# Patient Record
Sex: MALE | Race: WHITE | Employment: FULL TIME | URBAN - METROPOLITAN AREA
[De-identification: names, ages, dates, MRNs, and addresses within clinical notes are randomized per-mention and may not be internally consistent; named-entity substitution may affect disease eponyms.]

---

## 2021-09-16 ENCOUNTER — HOSPITAL ENCOUNTER (EMERGENCY)
Age: 41
Discharge: LEFT BEFORE DISCHARGE | End: 2021-09-16
Payer: MEDICAID

## 2021-09-16 VITALS
BODY MASS INDEX: 39.86 KG/M2 | OXYGEN SATURATION: 98 % | SYSTOLIC BLOOD PRESSURE: 167 MMHG | WEIGHT: 225 LBS | HEART RATE: 90 BPM | TEMPERATURE: 98.4 F | RESPIRATION RATE: 18 BRPM | DIASTOLIC BLOOD PRESSURE: 95 MMHG

## 2021-09-16 DIAGNOSIS — M79.672 LEFT FOOT PAIN: Primary | ICD-10-CM

## 2021-09-16 ASSESSMENT — PAIN SCALES - GENERAL: PAINLEVEL_OUTOF10: 7

## 2021-09-16 NOTE — ED PROVIDER NOTES
This patient was not seen by me. I was assigned to the patient, but he ended up leaving before being seen by a provider.          Sigrid Kearns [place]  09/16/21 5778

## 2021-11-09 ENCOUNTER — HOSPITAL ENCOUNTER (EMERGENCY)
Age: 41
Discharge: HOME | End: 2021-11-09
Payer: MEDICAID

## 2021-11-09 ENCOUNTER — APPOINTMENT (OUTPATIENT)
Dept: GENERAL RADIOLOGY | Age: 41
End: 2021-11-09
Payer: MEDICAID

## 2021-11-09 VITALS
WEIGHT: 225 LBS | RESPIRATION RATE: 16 BRPM | BODY MASS INDEX: 39.87 KG/M2 | HEART RATE: 96 BPM | DIASTOLIC BLOOD PRESSURE: 102 MMHG | SYSTOLIC BLOOD PRESSURE: 158 MMHG | OXYGEN SATURATION: 97 % | TEMPERATURE: 99.1 F | HEIGHT: 63 IN

## 2021-11-09 DIAGNOSIS — R07.9 CHEST PAIN, UNSPECIFIED TYPE: ICD-10-CM

## 2021-11-09 DIAGNOSIS — R05.9 COUGH: ICD-10-CM

## 2021-11-09 DIAGNOSIS — J18.9 PNEUMONIA OF BOTH LOWER LOBES DUE TO INFECTIOUS ORGANISM: Primary | ICD-10-CM

## 2021-11-09 LAB
A/G RATIO: 2.1 (ref 1.1–2.2)
ALBUMIN SERPL-MCNC: 5.1 G/DL (ref 3.4–5)
ALP BLD-CCNC: 81 U/L (ref 40–129)
ALT SERPL-CCNC: 39 U/L (ref 10–40)
ANION GAP SERPL CALCULATED.3IONS-SCNC: 15 MMOL/L (ref 3–16)
AST SERPL-CCNC: 21 U/L (ref 15–37)
BASOPHILS ABSOLUTE: 0.1 K/UL (ref 0–0.2)
BASOPHILS RELATIVE PERCENT: 0.9 %
BILIRUB SERPL-MCNC: 2.1 MG/DL (ref 0–1)
BUN BLDV-MCNC: 16 MG/DL (ref 7–20)
CALCIUM SERPL-MCNC: 9.7 MG/DL (ref 8.3–10.6)
CHLORIDE BLD-SCNC: 102 MMOL/L (ref 99–110)
CO2: 24 MMOL/L (ref 21–32)
CREAT SERPL-MCNC: 0.8 MG/DL (ref 0.9–1.3)
D DIMER: <200 NG/ML DDU (ref 0–229)
EOSINOPHILS ABSOLUTE: 0.1 K/UL (ref 0–0.6)
EOSINOPHILS RELATIVE PERCENT: 1.5 %
GFR AFRICAN AMERICAN: >60
GFR NON-AFRICAN AMERICAN: >60
GLUCOSE BLD-MCNC: 104 MG/DL (ref 70–99)
HCT VFR BLD CALC: 40 % (ref 40.5–52.5)
HEMOGLOBIN: 14 G/DL (ref 13.5–17.5)
LACTIC ACID, SEPSIS: 1 MMOL/L (ref 0.4–1.9)
LYMPHOCYTES ABSOLUTE: 1.7 K/UL (ref 1–5.1)
LYMPHOCYTES RELATIVE PERCENT: 17.7 %
MCH RBC QN AUTO: 29.5 PG (ref 26–34)
MCHC RBC AUTO-ENTMCNC: 34.9 G/DL (ref 31–36)
MCV RBC AUTO: 84.5 FL (ref 80–100)
MONOCYTES ABSOLUTE: 0.4 K/UL (ref 0–1.3)
MONOCYTES RELATIVE PERCENT: 4 %
NEUTROPHILS ABSOLUTE: 7.4 K/UL (ref 1.7–7.7)
NEUTROPHILS RELATIVE PERCENT: 75.9 %
PDW BLD-RTO: 13.4 % (ref 12.4–15.4)
PLATELET # BLD: 384 K/UL (ref 135–450)
PMV BLD AUTO: 7.5 FL (ref 5–10.5)
POTASSIUM REFLEX MAGNESIUM: 3.7 MMOL/L (ref 3.5–5.1)
PRO-BNP: 7 PG/ML (ref 0–124)
RBC # BLD: 4.74 M/UL (ref 4.2–5.9)
SARS-COV-2, NAAT: NOT DETECTED
SODIUM BLD-SCNC: 141 MMOL/L (ref 136–145)
TOTAL PROTEIN: 7.5 G/DL (ref 6.4–8.2)
TROPONIN: <0.01 NG/ML
WBC # BLD: 9.7 K/UL (ref 4–11)

## 2021-11-09 PROCEDURE — 83605 ASSAY OF LACTIC ACID: CPT

## 2021-11-09 PROCEDURE — 80053 COMPREHEN METABOLIC PANEL: CPT

## 2021-11-09 PROCEDURE — 85379 FIBRIN DEGRADATION QUANT: CPT

## 2021-11-09 PROCEDURE — 84484 ASSAY OF TROPONIN QUANT: CPT

## 2021-11-09 PROCEDURE — 6370000000 HC RX 637 (ALT 250 FOR IP): Performed by: NURSE PRACTITIONER

## 2021-11-09 PROCEDURE — 83880 ASSAY OF NATRIURETIC PEPTIDE: CPT

## 2021-11-09 PROCEDURE — 85025 COMPLETE CBC W/AUTO DIFF WBC: CPT

## 2021-11-09 PROCEDURE — 99283 EMERGENCY DEPT VISIT LOW MDM: CPT

## 2021-11-09 PROCEDURE — 93005 ELECTROCARDIOGRAM TRACING: CPT | Performed by: EMERGENCY MEDICINE

## 2021-11-09 PROCEDURE — 71045 X-RAY EXAM CHEST 1 VIEW: CPT

## 2021-11-09 PROCEDURE — 87635 SARS-COV-2 COVID-19 AMP PRB: CPT

## 2021-11-09 RX ORDER — DOXYCYCLINE HYCLATE 100 MG
100 TABLET ORAL ONCE
Status: COMPLETED | OUTPATIENT
Start: 2021-11-09 | End: 2021-11-09

## 2021-11-09 RX ORDER — DOXYCYCLINE HYCLATE 100 MG
100 TABLET ORAL 2 TIMES DAILY
Qty: 10 TABLET | Refills: 0 | Status: SHIPPED | OUTPATIENT
Start: 2021-11-09 | End: 2021-11-14

## 2021-11-09 RX ORDER — ALBUTEROL SULFATE 90 UG/1
2 AEROSOL, METERED RESPIRATORY (INHALATION) EVERY 4 HOURS PRN
Qty: 1 EACH | Refills: 0 | Status: SHIPPED | OUTPATIENT
Start: 2021-11-09

## 2021-11-09 RX ADMIN — DOXYCYCLINE HYCLATE 100 MG: 100 TABLET, COATED ORAL at 21:25

## 2021-11-09 ASSESSMENT — PAIN SCALES - GENERAL: PAINLEVEL_OUTOF10: 5

## 2021-11-09 ASSESSMENT — HEART SCORE: ECG: 0

## 2021-11-10 LAB
EKG ATRIAL RATE: 92 BPM
EKG DIAGNOSIS: NORMAL
EKG P AXIS: 34 DEGREES
EKG P-R INTERVAL: 150 MS
EKG Q-T INTERVAL: 344 MS
EKG QRS DURATION: 90 MS
EKG QTC CALCULATION (BAZETT): 425 MS
EKG R AXIS: 14 DEGREES
EKG T AXIS: 34 DEGREES
EKG VENTRICULAR RATE: 92 BPM

## 2021-11-10 PROCEDURE — 93010 ELECTROCARDIOGRAM REPORT: CPT | Performed by: INTERNAL MEDICINE

## 2021-11-10 NOTE — ED NOTES
Ambulated pt approx 40 feet on room air while monitoring pulse oximetry. Prior to ambulation, pt was resting comfortably with HR and SpO2 of 96 % and 93 bpm. During ambulation, pt's SpO2 and HR were 96% and 103 bpm. Pt stated that he did not feel CP, dizziness or lightheadedness but complained of some SOB that was unchanged while walking around. After ambulation, pt was returned to chair SpO2 and HR were 96% and 99 bpm. Satya Ruiz NP made aware.         Steve Terry  11/09/21 2102

## 2021-11-10 NOTE — ED PROVIDER NOTES
Evaluated by Advanced Practice Provider    EMERGENCY DEPARTMENT ENCOUNTER      [facility]  Concern For COVID-19 (possible exposure, cough and cp started today )    JEFRY Moe is a 42 y.o. male who presents to the emergency department with complaints of COVID. Feeling crappy last couple days, coughing and now chest hurting. He was told today he was exposed to [employer]. Had recent surgery. He had a nerve removed in his left foot. This was done on Friday. He was exposed to [place] yesterday. Saturday his symptoms started with chills, then progressed to cough, cough has worsened. Chest pain started today, right in the center. Thought is was surgery and then sinuses but things worsening. No fevers, reports chills and sweats. Reports a little bit of a sore throat. States he is used to the HA, mild body aches. Reports diarrhea, this is chronic. No nausea, vomiting. Has chronic abdominal pain due to stomach issues. CP started this morning, Has been a dull ache throughout the day. Does report a little bit of SOB. States his CP is not like a heart attack but he can't explain it, more like he cant catch his breath or take a deep breath in. It feels tight when he does. The patient arrived to the ED via private car. 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  1 each 0    doxycycline hyclate (VIBRA-TABS) 100 MG tablet Take 1 tablet by mouth 2 times daily for file   Stress:     Feeling of Stress : Not on file   Social Connections:     Frequency of Communication with Friends and Family: Not on file    Frequency of Social Gatherings with Friends and Family: Not on file    Attends Yazidi Services: Not on file    Active Member of Clubs or Organizations: Not on file    Attends Club or Organization Meetings: Not on file    Marital Status: Not on file   Intimate Partner Violence:     Fear of Current or Ex-Partner: Not on file    Emotionally Abused: Not on file    Physically Abused: Not on file    Sexually Abused: Not on file   Housing Stability:     Unable to Pay for Housing in the Last Year: Not on file    Number of [place] in the Last Year: Not on file    Unstable Housing in the Last Year: Not on file       REVIEW OF SYSTEMS    10 systems reviewed, pertinent positives per HPI otherwise noted to be negative    PHYSICAL EXAM  Physical Exam  Vitals:    11/09/21 1918   BP: (!) 158/102   Pulse: 96   Resp: 16   Temp: 99.1 °F (37.3 °C)   SpO2: 97%     GENERAL: Patient is well-developed, obese. Awake and alert. Cooperative. Resting in bed. No apparent distress. HEENT:  Normocephalic, atraumatic. Conjunctiva appear normal. Sclera is non-icteric. External ears are normal.  Nose is without epistaxis. Mucous membranes are moist. Uvula is midline and without edema. Posterior oropharynx is without edema, erythema or exudate. NECK: Supple with normal ROM. Trachea midline  LUNGS: Equal and symmetric chest rise. Breathing is unlabored. Speaking comfortably in full sentences. Lungs are clear bilaterally to auscultation. Without wheezing, rales, or rhonchi. CADIOVASCULAR:  Regular rate and rhythm. Normal S1-S2 sounds. No murmurs, rubs, or gallops. Capillary refill is brisk in all 4extremities. Bilateral lower extremities are equal in size, there is no swelling observed. There is no tenderness to palpation. There is no erythema observed or warmth palpated.   GI: Bilirubin 2.1 (H) 0.0 - 1.0 mg/dL    Alkaline Phosphatase 81 40 - 129 U/L    ALT 39 10 - 40 U/L    AST 21 15 - 37 U/L   Troponin   Result Value Ref Range    Troponin <0.01 <0.01 ng/mL   D-Dimer, Quantitative   Result Value Ref Range    D-Dimer, Quant <200 0 - 229 ng/mL DDU   Lactate, Sepsis   Result Value Ref Range    Lactic Acid, Sepsis 1.0 0.4 - 1.9 mmol/L   Brain Natriuretic Peptide   Result Value Ref Range    Pro-BNP 7 0 - 124 pg/mL   EKG 12 Lead   Result Value Ref Range    Ventricular Rate 92 BPM    Atrial Rate 92 BPM    P-R Interval 150 ms    QRS Duration 90 ms    Q-T Interval 344 ms    QTc Calculation (Bazett) 425 ms    P Axis 34 degrees    R Axis 14 degrees    T Axis 34 degrees    Diagnosis       Normal sinus rhythmNormal ECGWhen compared with ECG of 06-AUG-2016 19:59,Nonspecific T wave abnormality has replaced inverted T waves in Inferior leads       RADIOLOGY    XR CHEST PORTABLE    Result Date: 11/9/2021  EXAMINATION: ONE XRAY VIEW OF THE CHEST 11/9/2021 7:27 pm COMPARISON: 08/06/2016 HISTORY: ORDERING SYSTEM PROVIDED HISTORY: SOB, CP TECHNOLOGIST PROVIDED HISTORY: Reason for exam:->SOB, CP Reason for Exam: sob, cp Acuity: Acute Type of Exam: Initial FINDINGS: Cardiomediastinal silhouette within normal limits. Bilateral peripheral linear opacities. No pneumothorax. No pleural effusion. Bilateral peripheral linear opacities could represent atypical pneumonia or edema       ED COURSE/MDM  Patient seen and evaluated. Old records reviewed. Diagnostic testing reviewed and results discussed. I have evaluated this patient. My supervising physician was available for consultation. Addis Dukes presented to the ED with the above noted complaints. Arrival vital signs: Afebrile, hemodynamically stable except for blood pressure elevated at 158/102. Well saturated on room air. Physical exam performed at 1926: No evidence of systemic infection.  Hemoglobin is normal. Hematocrit slightly low at 40 and stable. No electrolyte abnormality. No evidence of acute kidney injury or transaminitis. Troponin is negative. D-dimer negative. Lactic acid levels normal. BNP normal at 7. Blood work:  EKG: EKG shows normal sinus rhythm without acute findings. Imaging: Chest x-ray shows bilateral peripheral linear opacities that could represent atypical pneumonia or edema. Patient does show a possibility of atypical pneumonia on his chest x-ray. Even though it is multifocal PNA I do not feel he needs to be admitted for his PNA at this time as his labs are extremely reassuring as he is without evidence of systemic infection. Lactic, troponin, D-dimer and BMP are all negative/normal.  Patient was ambulated in the ER and maintained his O2 saturations well on room air. I feel that he is a good candidate for outpatient treatment with oral antibiotics. He will be given doxycycline due to an allergy to azithromycin and the doxycycline does appear to have better atypical coverage than amoxicillin. Patient has a curb 65 0. Heart score of 2. I do not feel he needs a 2nd troponin in the ER as his pain started this morning and he is well past 3 hours since onset with a negative EKG and negative troponin. I feel risk of cardiac ischemia as cause to his complaints is low at this time. Medications given in the ED:   Medications   doxycycline hyclate (VIBRA-TABS) tablet 100 mg (100 mg Oral Given 11/9/21 2125)      At this point I do not feel the patient requires further work up and it is reasonable to discharge the patient. Please refer to AVS for further details regarding dischargeinstructions. A discussion was had with the patient regarding diagnosis, diagnostic testing results,treatment/ plan of care, and follow up. All questions were answered. Patient will follow up as directed for further evaluation/treatment.  The patient was given strict return precautions as we discussed symptoms that wouldnecessitate return to the ED. Patient will return to ED for new/worsening symptoms. The patient verbalized their understanding and agreement with the above plan. I estimate there is LOW risk for PULMONARY EMBOLISM, ACUTE CORONARY SYNDROME, OR THORACIC AORTIC DISSECTION, thus I consider the discharge disposition reasonable. Haim Stevens and I have discussed the diagnosisand risks, and we agree with discharging home to follow-up with their primary doctor. We also discussed returning to the Emergency Department immediately if new or worsening symptoms occur. We have discussed the symptomswhich are most concerning (e.g., bloody sputum, fever, worsening pain or shortness of breath, vomiting) that necessitate immediate return. was sent home with a prescription for below medication/s. I did [place] patient on appropriate use of these medication. Discharge Medication List as of 11/9/2021  9:19 PM      START taking these medications    Details   doxycycline hyclate (VIBRA-TABS) 100 MG tablet Take 1 tablet by mouth 2 times daily for 5 days, Disp-10 tablet, R-0Print           CLINICAL IMPRESSION    1. Pneumonia of both lower lobes due to infectious organism    2. Cough    3. Chest pain, unspecified type       Discharge Vitals:  Blood pressure (!) 158/102, pulse 96, temperature 99.1 °F (37.3 °C), temperature source Oral, resp. rate 16, height 5' 3\" (1.6 m), weight 225 lb (102.1 kg), SpO2 97 %. FOLLOW UP  Svetlana Greenberg, DO  [address]  [phone]    Call in 1 week  For further evaluation-will need repeat Chest Xray in 1 week to assure findings improve    [facility]  ED  [address] [facility] [address] [facility]  Go to   If symptoms worsen      DISPOSITION  Patient was discharged to home in good condition.     Comment: Please note this report has been produced using speech recognition software and may contain errors related to that system including errorsin grammar, punctuation, and spelling, as well as words and phrases that may be inappropriate. If there are any questions or concerns please feel free to contact the dictating provider for clarification.         FLORES Duque CNP  11/09/21 2146       FLORES Duque CNP  11/09/21 2144

## 2021-11-10 NOTE — ED NOTES
AVS printed and given to patient along with prescriptions. Patient verbalized understanding. Patient denied having any concerns or complaints at this time. Patient leaving department in stable condition.          Sherren Glee, RN  11/09/21 8919

## 2021-11-14 ENCOUNTER — HOSPITAL ENCOUNTER (EMERGENCY)
Age: 41
Discharge: HOME | End: 2021-11-14
Payer: MEDICAID

## 2021-11-14 ENCOUNTER — APPOINTMENT (OUTPATIENT)
Dept: GENERAL RADIOLOGY | Age: 41
End: 2021-11-14
Payer: MEDICAID

## 2021-11-14 VITALS
BODY MASS INDEX: 38.41 KG/M2 | DIASTOLIC BLOOD PRESSURE: 97 MMHG | HEART RATE: 84 BPM | OXYGEN SATURATION: 96 % | SYSTOLIC BLOOD PRESSURE: 131 MMHG | RESPIRATION RATE: 18 BRPM | TEMPERATURE: 99.1 F | WEIGHT: 225 LBS | HEIGHT: 64 IN

## 2021-11-14 DIAGNOSIS — R05.9 COUGH: Primary | ICD-10-CM

## 2021-11-14 LAB
A/G RATIO: 2 (ref 1.1–2.2)
ALBUMIN SERPL-MCNC: 4.9 G/DL (ref 3.4–5)
ALP BLD-CCNC: 75 U/L (ref 40–129)
ALT SERPL-CCNC: 30 U/L (ref 10–40)
ANION GAP SERPL CALCULATED.3IONS-SCNC: 12 MMOL/L (ref 3–16)
AST SERPL-CCNC: 21 U/L (ref 15–37)
BASOPHILS ABSOLUTE: 0.1 K/UL (ref 0–0.2)
BASOPHILS RELATIVE PERCENT: 1 %
BILIRUB SERPL-MCNC: 3.2 MG/DL (ref 0–1)
BUN BLDV-MCNC: 13 MG/DL (ref 7–20)
CALCIUM SERPL-MCNC: 9.9 MG/DL (ref 8.3–10.6)
CHLORIDE BLD-SCNC: 105 MMOL/L (ref 99–110)
CO2: 23 MMOL/L (ref 21–32)
CREAT SERPL-MCNC: 0.7 MG/DL (ref 0.9–1.3)
EOSINOPHILS ABSOLUTE: 0.2 K/UL (ref 0–0.6)
EOSINOPHILS RELATIVE PERCENT: 2.4 %
GFR AFRICAN AMERICAN: >60
GFR NON-AFRICAN AMERICAN: >60
GLUCOSE BLD-MCNC: 105 MG/DL (ref 70–99)
HCT VFR BLD CALC: 39.8 % (ref 40.5–52.5)
HEMOGLOBIN: 13.9 G/DL (ref 13.5–17.5)
LACTIC ACID, SEPSIS: 0.6 MMOL/L (ref 0.4–1.9)
LYMPHOCYTES ABSOLUTE: 1.8 K/UL (ref 1–5.1)
LYMPHOCYTES RELATIVE PERCENT: 27.3 %
MAGNESIUM: 2.2 MG/DL (ref 1.8–2.4)
MCH RBC QN AUTO: 29.2 PG (ref 26–34)
MCHC RBC AUTO-ENTMCNC: 34.8 G/DL (ref 31–36)
MCV RBC AUTO: 83.9 FL (ref 80–100)
MONOCYTES ABSOLUTE: 0.4 K/UL (ref 0–1.3)
MONOCYTES RELATIVE PERCENT: 6.6 %
NEUTROPHILS ABSOLUTE: 4.1 K/UL (ref 1.7–7.7)
NEUTROPHILS RELATIVE PERCENT: 62.7 %
PDW BLD-RTO: 13.3 % (ref 12.4–15.4)
PLATELET # BLD: 357 K/UL (ref 135–450)
PMV BLD AUTO: 8.1 FL (ref 5–10.5)
POTASSIUM REFLEX MAGNESIUM: 3.5 MMOL/L (ref 3.5–5.1)
RBC # BLD: 4.75 M/UL (ref 4.2–5.9)
SARS-COV-2, NAAT: NOT DETECTED
SODIUM BLD-SCNC: 140 MMOL/L (ref 136–145)
TOTAL PROTEIN: 7.3 G/DL (ref 6.4–8.2)
TROPONIN: <0.01 NG/ML
WBC # BLD: 6.6 K/UL (ref 4–11)

## 2021-11-14 PROCEDURE — 84484 ASSAY OF TROPONIN QUANT: CPT

## 2021-11-14 PROCEDURE — 87635 SARS-COV-2 COVID-19 AMP PRB: CPT

## 2021-11-14 PROCEDURE — 99283 EMERGENCY DEPT VISIT LOW MDM: CPT

## 2021-11-14 PROCEDURE — 83735 ASSAY OF MAGNESIUM: CPT

## 2021-11-14 PROCEDURE — 93005 ELECTROCARDIOGRAM TRACING: CPT | Performed by: PHYSICIAN ASSISTANT

## 2021-11-14 PROCEDURE — 83605 ASSAY OF LACTIC ACID: CPT

## 2021-11-14 PROCEDURE — 80053 COMPREHEN METABOLIC PANEL: CPT

## 2021-11-14 PROCEDURE — 71046 X-RAY EXAM CHEST 2 VIEWS: CPT

## 2021-11-14 PROCEDURE — 85025 COMPLETE CBC W/AUTO DIFF WBC: CPT

## 2021-11-14 PROCEDURE — 6360000002 HC RX W HCPCS: Performed by: PHYSICIAN ASSISTANT

## 2021-11-14 RX ORDER — DEXAMETHASONE 4 MG/1
4 TABLET ORAL ONCE
Status: COMPLETED | OUTPATIENT
Start: 2021-11-14 | End: 2021-11-14

## 2021-11-14 RX ADMIN — DEXAMETHASONE 4 MG: 4 TABLET ORAL at 21:07

## 2021-11-14 ASSESSMENT — PAIN SCALES - GENERAL: PAINLEVEL_OUTOF10: 6

## 2021-11-14 NOTE — Clinical Note
Kristel Johnson was seen and treated in our emergency department on 11/14/2021. He may return to work on 11/16/2021. If you have any questions or concerns, please don't hesitate to call.       Adriana Morin PA-C

## 2021-11-14 NOTE — Clinical Note
Wandalee Nissen was seen and treated in our emergency department on 11/14/2021. He may return to work on 11/16/2021. If you have any questions or concerns, please don't hesitate to call.       Jesus Haywood PA-C

## 2021-11-15 LAB
EKG ATRIAL RATE: 82 BPM
EKG DIAGNOSIS: NORMAL
EKG P AXIS: 22 DEGREES
EKG P-R INTERVAL: 160 MS
EKG Q-T INTERVAL: 370 MS
EKG QRS DURATION: 90 MS
EKG QTC CALCULATION (BAZETT): 432 MS
EKG R AXIS: -2 DEGREES
EKG T AXIS: 29 DEGREES
EKG VENTRICULAR RATE: 82 BPM

## 2021-11-15 PROCEDURE — 93010 ELECTROCARDIOGRAM REPORT: CPT | Performed by: INTERNAL MEDICINE

## 2021-11-15 NOTE — ED PROVIDER NOTES
[facility] Emergency Department    CHIEF COMPLAINT  Cough (pt to ED with c/o cough related to pneumonia that pt was diagnosed with here a few days ago. reports wheezing.)      SHARED SERVICE VISIT  Evaluated by HENRY. My supervising physician was available for consultation. HISTORY OF PRESENT ILLNESS  Gayathri Das is a 42 y.o. male returns to the ED for evaluation of worsening cough, fevers and chills. Patient states that he was diagnosed with pneumonia 5 days ago and was discharged home with doxycycline. She states due to financial difficulties he did not go  the medication. Since then he states that he has developed worsening cough as well as fevers and chills. Denies any chest pain. Admits to occasional cigar smoking. She states that at this time he is unable to afford any medication until he gets a paycheck. No other complaints, modifying factors or associated symptoms. Nursing notes reviewed. 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 No pertinent family history. Social History     Socioeconomic History    Marital status:      Spouse name: Not on file    Number of children: Not on file    Years of education: Not on file    Highest education level: Not on file   Occupational History    Not on file   Tobacco Use    Smoking status: Current Some Day Smoker     Types: Pipe, Cigars    Smokeless tobacco: Never Used   Substance and Sexual Activity    Alcohol use:  Yes     Alcohol/week: 0.0 standard drinks     Comment: occasional    Drug use: No    Sexual activity: Not on file   Other Topics Concern    Not on file   Social History Narrative    Not on file     Social Determinants of Health     Financial Resource Strain:     Difficulty of Paying Living Expenses: Not on file   Food Insecurity:     Worried About Running Out of Food in the Last Year: Not on file    Anastasiia of Food in the Last Year: Not on file   Transportation Needs:     Lack of Transportation (Medical): Not on file    Lack of Transportation (Non-Medical): Not on file   Physical Activity:     Days of Exercise per Week: Not on file    Minutes of Exercise per Session: Not on file   Stress:     Feeling of Stress : Not on file   Social Connections:     Frequency of Communication with Friends and Family: Not on file    Frequency of Social Gatherings with Friends and Family: Not on file    Attends Religion Services: Not on file    Active Member of [address] [place] [address] [employer] or Organizations: Not on file    Attends Club or Organization Meetings: Not on file    Marital Status: Not on file   Intimate Partner Violence:     Fear of Current or Ex-Partner: Not on file    Emotionally Abused: Not on file    Physically Abused: Not on file    Sexually Abused: Not on file   Housing Stability:     Unable to Pay for Housing in the Last Year: Not on file    Number of [place] in the Last Year: Not on file    Unstable Housing in the Last Year: Not on file     No current facility-administered medications for this encounter. Current Outpatient Medications   Medication Sig Dispense Refill    albuterol sulfate HFA (PROVENTIL HFA) 108 (90 Base) MCG/ACT inhaler Inhale 2 puffs into the lungs every 4 hours as needed for Wheezing or Shortness of Breath (Space out to every 6 hours as symptoms improve) Space out to every 6 hours as symptoms improve.  1 each 0    doxycycline hyclate (VIBRA-TABS) 100 MG tablet Take 1 tablet by mouth 2 times daily for 5 days 10 tablet 0    lisinopril (PRINIVIL;ZESTRIL) 10 MG tablet Take 1 tablet by mouth daily 14 tablet 0    lisinopril (PRINIVIL;ZESTRIL) 10 MG tablet Take 1 tablet by mouth daily 30 tablet 1    ibuprofen (IBU) 800 MG tablet Take 1 tablet by mouth every 8 hours as needed for Pain. 20 tablet 0    Pseudoephedrine HCl (SINUS & ALLERGY 12 HOUR PO) Take  by mouth.  naproxen (NAPROSYN) 500 MG tablet Take 1 tablet by mouth 2 times daily for 10 days. 20 tablet 0    Lansoprazole (PREVACID PO) Take  by mouth.  DiphenhydrAMINE HCl (BENADRYL ALLERGY PO) Take  by mouth. Allergies   Allergen Reactions    Azithromycin Nausea And Vomiting    Ambien [Zolpidem Tartrate]     Codeine Other (See Comments)     dizziness    Procaine Hcl        REVIEW OF SYSTEMS  10 systems reviewed, pertinent positives per HPI otherwise noted to be negative    PHYSICAL EXAM  BP (!) 131/97   Pulse 84   Temp 99.1 °F (37.3 °C) (Oral)   Resp 18   Ht 5' 3.5\" (1.613 m)   Wt 225 lb (102.1 kg)   SpO2 96%   BMI 39.23 kg/m²   GENERAL APPEARANCE: Awake and alert. Cooperative. Frequent cough. HEAD: Normocephalic. Atraumatic. EYES: PERRL. EOM's grossly intact. ENT: Mucous membranes are moist.   NECK: Supple. HEART: RRR. No murmurs. LUNGS: Respirations unlabored. CTAB. Good air exchange. Speaking comfortably in full sentences. No wheeze or rhonchi. EXTREMITIES: No peripheral edema. Moves all extremities equally. All extremities neurovascularly intact. SKIN: Warm and dry. No acute rashes. NEUROLOGICAL: Alert and oriented. CN's 2-12 intact. No gross facial drooping. Strength 5/5, sensation intact. PSYCHIATRIC: Normal mood and affect. RADIOLOGY  XR CHEST (2 VW)   Final Result   No evidence of acute cardiopulmonary disease. Previously described bilateral   peripheral linear opacities may have represented atelectasis.                LABS  Labs Reviewed   CBC WITH AUTO DIFFERENTIAL - Abnormal; Notable for the following components:       Result Value    Hematocrit 39.8 (*)     All other components within normal limits    Narrative:     Performed at:  [facility]  [address] [place] [address],  [place], [address] [place] [address] [place]   Phone [phone]   COMPREHENSIVE METABOLIC PANEL W/ REFLEX TO MG FOR LOW K - Abnormal; Notable for the following components:    Glucose 105 (*)     CREATININE 0.7 (*)     Total Bilirubin 3.2 (*)     All other components within normal limits    Narrative:     Performed at:  [address],  [place], [place] [employer]   Phone [phone], RAPID   LACTATE, SEPSIS    Narrative:     Performed at:  [address],  [place], [address] [employer]   Phone [phone]   TROPONIN    Narrative:     Performed at:  [address],  [place], [address] [employer]   Phone [phone]   MAGNESIUM    Narrative:     Performed at:  [address],  [place], [place] [employer]   Phone [phone]       PROCEDURES  Unless otherwise noted below, none  Procedures    MDM  MDM  50-year-old male returns emergency department after being diagnosed with community-acquired pneumonia 5 days prior. Patient negative Covid test at that time. Since then patient states he did not take up his medication due lack of finances. Patient states it does not matter what the cost as he is not going to be able afford it due to having no disposable finances. On arrival to the ED patient saw elevated blood pressure 164/101, temp 99.1 pulse 84, rest saturation 97% on room air and is speaking in full sentences. Physical exam no wheeze or rhonchi appreciated. Patient is denying any exertional chest pain or pleuritic chest pain. Patient does state he has some burning whenever he coughs due to frequent coughing. Patient did have recent surgery 10 days ago however negative D-dimer 5 days ago and does not have any pleuritic chest pain, hypoxia or tachycardia. Patient's lab work was returned reassuring with no leukocytosis, lactic acid 0.6 within normal limits, no electrolyte abnormalities or kidney injuries. Troponin was negative.   Chest x-ray was obtained a 2 view versus the one view which demonstrated no evidence of acute cardiopulmonary disease. Previous described bilateral peripheral linear opacities may have been representative as atelectasis. Patient was requesting a COVID-19 test despite a negative test 5 days ago which was 2 days after his exposure. Do believe this is within reason. Obtain a Covid test and patient discharged home. If positive patient will quarantine for 10 days from the onset of his symptoms which at this time return in 5 to 6 days. Patient also requested a work note and do believe within reason department in 1 day off. EKG demonstrated ventricular rate 82 bpm normal sinus rhythm with no ST elevation or T wave inversion noted. Patient's heart score is considered low making his risk of major adverse cardiac event less than 1.7% over the next 6 weeks. Patient is active denying any chest pain or exertional dyspnea. DISPOSITION  Patient was discharged to home in good condition. CLINICAL IMPRESSION  1.  Cough           Tristen Lynn PA-C  11/14/21 6766

## 2021-11-25 ENCOUNTER — HOSPITAL ENCOUNTER (EMERGENCY)
Age: 41
Discharge: HOME | End: 2021-11-25
Payer: MEDICAID

## 2021-11-25 VITALS
HEART RATE: 77 BPM | OXYGEN SATURATION: 94 % | DIASTOLIC BLOOD PRESSURE: 101 MMHG | SYSTOLIC BLOOD PRESSURE: 140 MMHG | RESPIRATION RATE: 16 BRPM | BODY MASS INDEX: 38.07 KG/M2 | TEMPERATURE: 98.3 F | HEIGHT: 64 IN | WEIGHT: 223 LBS

## 2021-11-25 DIAGNOSIS — M79.672 LEFT FOOT PAIN: Primary | ICD-10-CM

## 2021-11-25 PROCEDURE — 6370000000 HC RX 637 (ALT 250 FOR IP): Performed by: NURSE PRACTITIONER

## 2021-11-25 PROCEDURE — 99284 EMERGENCY DEPT VISIT MOD MDM: CPT

## 2021-11-25 RX ORDER — DOXYCYCLINE HYCLATE 100 MG
100 TABLET ORAL ONCE
Status: COMPLETED | OUTPATIENT
Start: 2021-11-25 | End: 2021-11-25

## 2021-11-25 RX ADMIN — DOXYCYCLINE HYCLATE 100 MG: 100 TABLET, COATED ORAL at 21:51

## 2021-11-25 ASSESSMENT — PAIN SCALES - GENERAL: PAINLEVEL_OUTOF10: 10

## 2021-11-26 NOTE — ED NOTES
Pt ok to d/c to home. Pt given d/c instructions. Pt verbalized understating including Rx and follow up care. Pt ambulated to lobby for ride home.  0 s/s of distress at time of d/c.          Omar Booker RN  11/25/21 9073

## 2021-11-26 NOTE — ED PROVIDER NOTES
[facility] Emergency Department    CHIEF COMPLAINT  Foot Pain (pt had foot surgery 2 weeks ago, left foot pain and possible infection after stich removal 5 days, prescribed ABX but has not started due to not having money to purchase )      HISTORY OF PRESENT ILLNESS  Shashi Arteaga is a 42 y.o. male who presents to the ED complaining of left foot pain. Patient reports that he had surgery on his foot 20 days ago. Suture removal 5 days ago after sutures removed he noticed some redness, swelling, discharge from incision surgeon did not see him in person but prescribed doxycycline just \"to be safe. \"  Patient did not fill this medication due to financial reasons per patient. Patient does have a follow-up appointment with his surgeon on December 2. Patient presents this evening in the emergency department because of pain. Patient reports that he has been taking ibuprofen with no relief of pain. Surgeon did not prescribe any additional pain medication. Patient denies fever, chills, body aches. No other complaints, modifying factors or associated symptoms. Nursing notes reviewed. Past Medical History:   Diagnosis Date    Migraine     Pneumonia     Seizures (HCC)     temporal seizures as a kid     Past Surgical History:   Procedure Laterality Date    HAND SURGERY      KNEE ARTHROSCOPY       No family history on file. Social History     Socioeconomic History    Marital status:      Spouse name: Not on file    Number of children: Not on file    Years of education: Not on file    Highest education level: Not on file   Occupational History    Not on file   Tobacco Use    Smoking status: Current Some Day Smoker     Types: Pipe, Cigars    Smokeless tobacco: Never Used   Substance and Sexual Activity    Alcohol use:  Yes     Alcohol/week: 0.0 standard drinks     Comment: occasional    Drug use: No    Sexual activity: Not on file   Other Topics Concern    Not on file Social History Narrative    Not on file     Social Determinants of Health     Financial Resource Strain:     Difficulty of Paying Living Expenses: Not on file   Food Insecurity:     Worried About Running Out of Food in the Last Year: Not on file    Anastasiia of Food in the Last Year: Not on file   Transportation Needs:     Lack of Transportation (Medical): Not on file    Lack of Transportation (Non-Medical): Not on file   Physical Activity:     Days of Exercise per Week: Not on file    Minutes of Exercise per Session: Not on file   Stress:     Feeling of Stress : Not on file   Social Connections:     Frequency of Communication with Friends and Family: Not on file    Frequency of Social Gatherings with Friends and Family: Not on file    Attends [employer] Services: Not on file    Active Member of [address] [place] [address] [employer] or Organizations: Not on file    Attends Club or Organization Meetings: Not on file    Marital Status: Not on file   Intimate Partner Violence:     Fear of Current or Ex-Partner: Not on file    Emotionally Abused: Not on file    Physically Abused: Not on file    Sexually Abused: Not on file   Housing Stability:     Unable to Pay for Housing in the Last Year: Not on file    Number of [place] in the Last Year: Not on file    Unstable Housing in the Last Year: Not on file     No current facility-administered medications for this encounter. Current Outpatient Medications   Medication Sig Dispense Refill    albuterol sulfate HFA (PROVENTIL HFA) 108 (90 Base) MCG/ACT inhaler Inhale 2 puffs into the lungs every 4 hours as needed for Wheezing or Shortness of Breath (Space out to every 6 hours as symptoms improve) Space out to every 6 hours as symptoms improve.  1 each 0    lisinopril (PRINIVIL;ZESTRIL) 10 MG tablet Take 1 tablet by mouth daily 14 tablet 0    lisinopril (PRINIVIL;ZESTRIL) 10 MG tablet Take 1 tablet by mouth daily 30 tablet 1    ibuprofen (IBU) 800 MG tablet Take 1 tablet by Patient given first dose of antibiotics doxycycline was prescribed by surgeon patient instructed to fill this prescription tomorrow. Patient instructed to continue ibuprofen and Tylenol and to discuss further pain management with surgeon. Patient agreeable with plan of care. While in ED patient received   Medications   doxycycline hyclate (VIBRA-TABS) tablet 100 mg (100 mg Oral Given 11/25/21 2151)       At this point I do not feel the patient requires further work up and it is reasonable to discharge the patient. A discussion was had with the patient and/or their surrogate regarding diagnosis, diagnostic testing results, treatment/ plan of care, and follow up. There was shared decision-making between myself as well as the patient and/or their surrogate and we are all in agreement with discharge home. There was an opportunity for questions and all questions were answered to the best of my ability and to the satisfaction of the patient and/or patient family. Patient will follow up with surgeon for further evaluation/treatment. The patient was given strict return precautions as we discussed symptoms that would necessitate return to the ED. Patient will return to ED for new/worsening symptoms. The patient verbalized their understanding and agreement with the above plan. Please refer to AVS for further details regarding discharge instructions. No results found for this visit on 11/25/21. I estimate there is LOW risk for COMPARTMENT SYNDROME, DEEP VENOUS THROMBOSIS, SEPTIC ARTHRITIS, TENDON OR NEUROVASCULAR INJURY, thus I consider the discharge disposition reasonable. Haim Stevens and I have discussed the diagnosis and risks, and we agree with discharging home to follow-up with their primary doctor or the referral orthopedist. We also discussed returning to the Emergency Department immediately if new or worsening symptoms occur.  We have discussed the symptoms which are most concerning (e.g., changing or worsening pain, numbness, weakness) that necessitate immediate return. Final Impression    1. Left foot pain        Blood pressure (!) 140/101, pulse 77, temperature 98.3 °F (36.8 °C), temperature source Oral, resp. rate 16, height 5' 3.5\" (1.613 m), weight 223 lb (101.2 kg), SpO2 94 %.mdm    Patient was sent home with a prescription for below medication/s. I did [place] patient on appropriate use of these medication. Discharge Medication List as of 11/25/2021  9:54 PM          \  [facility]  ED  [address]        your surgeon            DISPOSITION  Patient was discharged to home in good condition. Comment: Please note this report has been produced using speech recognition software and may contain errors related to that system including errors in grammar, punctuation, and spelling, as well as words and phrases that may be inappropriate. If there are any questions or concerns please feel free to contact the dictating provider for clarification.         Arron Canada, FLORES - ED  11/26/21 0847

## 2021-11-26 NOTE — ED NOTES
Bed: 21  Expected date:   Expected time:   Means of arrival:   Comments:  Javier Thapa RN  11/25/21 9490

## 2021-11-29 ENCOUNTER — APPOINTMENT (OUTPATIENT)
Dept: GENERAL RADIOLOGY | Age: 41
End: 2021-11-29
Payer: MEDICAID

## 2021-11-29 ENCOUNTER — HOSPITAL ENCOUNTER (EMERGENCY)
Age: 41
Discharge: HOME | End: 2021-11-29
Payer: MEDICAID

## 2021-11-29 ENCOUNTER — APPOINTMENT (OUTPATIENT)
Dept: CT IMAGING | Age: 41
End: 2021-11-29
Payer: MEDICAID

## 2021-11-29 ENCOUNTER — APPOINTMENT (OUTPATIENT)
Dept: VASCULAR LAB | Age: 41
End: 2021-11-29
Payer: MEDICAID

## 2021-11-29 VITALS
RESPIRATION RATE: 18 BRPM | SYSTOLIC BLOOD PRESSURE: 146 MMHG | OXYGEN SATURATION: 96 % | HEART RATE: 83 BPM | DIASTOLIC BLOOD PRESSURE: 85 MMHG | TEMPERATURE: 99.1 F

## 2021-11-29 DIAGNOSIS — M79.662 PAIN AND SWELLING OF LEFT LOWER LEG: Primary | ICD-10-CM

## 2021-11-29 DIAGNOSIS — M79.672 LEFT FOOT PAIN: ICD-10-CM

## 2021-11-29 DIAGNOSIS — M79.89 PAIN AND SWELLING OF LEFT LOWER LEG: Primary | ICD-10-CM

## 2021-11-29 LAB
A/G RATIO: 1.7 (ref 1.1–2.2)
ALBUMIN SERPL-MCNC: 4.7 G/DL (ref 3.4–5)
ALP BLD-CCNC: 74 U/L (ref 40–129)
ALT SERPL-CCNC: 33 U/L (ref 10–40)
ANION GAP SERPL CALCULATED.3IONS-SCNC: 12 MMOL/L (ref 3–16)
AST SERPL-CCNC: 19 U/L (ref 15–37)
BASOPHILS ABSOLUTE: 0.1 K/UL (ref 0–0.2)
BASOPHILS RELATIVE PERCENT: 1.2 %
BILIRUB SERPL-MCNC: 1.7 MG/DL (ref 0–1)
BUN BLDV-MCNC: 13 MG/DL (ref 7–20)
CALCIUM SERPL-MCNC: 9.8 MG/DL (ref 8.3–10.6)
CHLORIDE BLD-SCNC: 102 MMOL/L (ref 99–110)
CO2: 21 MMOL/L (ref 21–32)
CREAT SERPL-MCNC: 0.7 MG/DL (ref 0.9–1.3)
EOSINOPHILS ABSOLUTE: 0.2 K/UL (ref 0–0.6)
EOSINOPHILS RELATIVE PERCENT: 2.7 %
GFR AFRICAN AMERICAN: >60
GFR NON-AFRICAN AMERICAN: >60
GLUCOSE BLD-MCNC: 120 MG/DL (ref 70–99)
HCT VFR BLD CALC: 45.5 % (ref 40.5–52.5)
HEMOGLOBIN: 15.3 G/DL (ref 13.5–17.5)
LACTIC ACID, SEPSIS: 0.6 MMOL/L (ref 0.4–1.9)
LACTIC ACID, SEPSIS: 2.3 MMOL/L (ref 0.4–1.9)
LYMPHOCYTES ABSOLUTE: 1.4 K/UL (ref 1–5.1)
LYMPHOCYTES RELATIVE PERCENT: 20 %
MCH RBC QN AUTO: 29.4 PG (ref 26–34)
MCHC RBC AUTO-ENTMCNC: 33.6 G/DL (ref 31–36)
MCV RBC AUTO: 87.4 FL (ref 80–100)
MONOCYTES ABSOLUTE: 0.4 K/UL (ref 0–1.3)
MONOCYTES RELATIVE PERCENT: 5.4 %
NEUTROPHILS ABSOLUTE: 5.1 K/UL (ref 1.7–7.7)
NEUTROPHILS RELATIVE PERCENT: 70.7 %
PDW BLD-RTO: 13.1 % (ref 12.4–15.4)
PLATELET # BLD: 372 K/UL (ref 135–450)
PMV BLD AUTO: 7.9 FL (ref 5–10.5)
POTASSIUM REFLEX MAGNESIUM: 4.2 MMOL/L (ref 3.5–5.1)
RBC # BLD: 5.2 M/UL (ref 4.2–5.9)
SODIUM BLD-SCNC: 135 MMOL/L (ref 136–145)
TOTAL PROTEIN: 7.5 G/DL (ref 6.4–8.2)
WBC # BLD: 7.2 K/UL (ref 4–11)

## 2021-11-29 PROCEDURE — 6360000002 HC RX W HCPCS: Performed by: NURSE PRACTITIONER

## 2021-11-29 PROCEDURE — 73590 X-RAY EXAM OF LOWER LEG: CPT

## 2021-11-29 PROCEDURE — 93971 EXTREMITY STUDY: CPT

## 2021-11-29 PROCEDURE — 96374 THER/PROPH/DIAG INJ IV PUSH: CPT

## 2021-11-29 PROCEDURE — 85025 COMPLETE CBC W/AUTO DIFF WBC: CPT

## 2021-11-29 PROCEDURE — 73630 X-RAY EXAM OF FOOT: CPT

## 2021-11-29 PROCEDURE — 2580000003 HC RX 258: Performed by: NURSE PRACTITIONER

## 2021-11-29 PROCEDURE — 73700 CT LOWER EXTREMITY W/O DYE: CPT

## 2021-11-29 PROCEDURE — 99284 EMERGENCY DEPT VISIT MOD MDM: CPT

## 2021-11-29 PROCEDURE — 6370000000 HC RX 637 (ALT 250 FOR IP): Performed by: NURSE PRACTITIONER

## 2021-11-29 PROCEDURE — 83605 ASSAY OF LACTIC ACID: CPT

## 2021-11-29 PROCEDURE — 80053 COMPREHEN METABOLIC PANEL: CPT

## 2021-11-29 RX ORDER — HYDROCODONE BITARTRATE AND ACETAMINOPHEN 5; 325 MG/1; MG/1
TABLET ORAL
COMMUNITY
Start: 2021-11-05

## 2021-11-29 RX ORDER — ONDANSETRON 2 MG/ML
4 INJECTION INTRAMUSCULAR; INTRAVENOUS EVERY 30 MIN PRN
Status: DISCONTINUED | OUTPATIENT
Start: 2021-11-29 | End: 2021-11-29 | Stop reason: HOSPADM

## 2021-11-29 RX ORDER — DOXYCYCLINE HYCLATE 100 MG
100 TABLET ORAL 2 TIMES DAILY
COMMUNITY
Start: 2021-11-22 | End: 2021-12-02

## 2021-11-29 RX ORDER — GABAPENTIN 100 MG/1
CAPSULE ORAL
COMMUNITY
Start: 2021-07-16 | End: 2021-12-17

## 2021-11-29 RX ORDER — HYDROCHLOROTHIAZIDE 12.5 MG/1
12.5 CAPSULE, GELATIN COATED ORAL DAILY
COMMUNITY

## 2021-11-29 RX ORDER — OXYCODONE HYDROCHLORIDE AND ACETAMINOPHEN 5; 325 MG/1; MG/1
1 TABLET ORAL ONCE
Status: COMPLETED | OUTPATIENT
Start: 2021-11-29 | End: 2021-11-29

## 2021-11-29 RX ORDER — 0.9 % SODIUM CHLORIDE 0.9 %
1000 INTRAVENOUS SOLUTION INTRAVENOUS ONCE
Status: COMPLETED | OUTPATIENT
Start: 2021-11-29 | End: 2021-11-29

## 2021-11-29 RX ORDER — OXYCODONE HYDROCHLORIDE AND ACETAMINOPHEN 5; 325 MG/1; MG/1
1-2 TABLET ORAL EVERY 6 HOURS PRN
Qty: 8 TABLET | Refills: 0 | Status: SHIPPED | OUTPATIENT
Start: 2021-11-29 | End: 2021-12-02

## 2021-11-29 RX ADMIN — ONDANSETRON 4 MG: 2 INJECTION INTRAMUSCULAR; INTRAVENOUS at 13:20

## 2021-11-29 RX ADMIN — SODIUM CHLORIDE 1000 ML: 9 INJECTION, SOLUTION INTRAVENOUS at 13:16

## 2021-11-29 RX ADMIN — OXYCODONE AND ACETAMINOPHEN 1 TABLET: 5; 325 TABLET ORAL at 16:47

## 2021-11-29 RX ADMIN — OXYCODONE AND ACETAMINOPHEN 1 TABLET: 5; 325 TABLET ORAL at 13:20

## 2021-11-29 ASSESSMENT — PAIN SCALES - GENERAL
PAINLEVEL_OUTOF10: 9
PAINLEVEL_OUTOF10: 9
PAINLEVEL_OUTOF10: 10

## 2021-11-29 NOTE — ED PROVIDER NOTES
Evaluated by [id number] [facility] Provider    [address] [facility]  ED    CHIEF COMPLAINT  Post-op Problem (L foot surgery 3 weeks ago at OSH for brown neuroma and is now expierencing pain and swelling and concerned for infection)    HISTORY OF PRESENT ILLNESS  Oumou Beltran is a 42 y.o. male who presents to the ED with complaints of left leg and foot pain. 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 Reports that stitches were removed and then it got infected, has been on antibiotics for 4 days. He does report that he can still press on the incision area and get pus to come out. He does not feel like the infection has improved since starting the antibiotics. Woke up this morning and his left leg is hurting from below the knee in to the foot. It is twitching because of the pain. Has low grade temp here, no sweats, reports feeling cold. No nausea, vomiting, has chronic GI issues. Sitting in the chair he reports his pain is not significant. The patient arrived to the ED via private car. Nursing notes reviewed. Past Medical History:   Diagnosis Date    Migraine     Pneumonia     Seizures ([address]     temporal seizures as a kid     Past Surgical History:   Procedure Laterality Date    FOOT SURGERY      HAND SURGERY      KNEE ARTHROSCOPY       History reviewed. No pertinent family history. Social History     Socioeconomic History    Marital status:      Spouse name: Not on file    Number of children: Not on file    Years of education: Not on file    Highest education level: Not on file   Occupational History    Not on file   Tobacco Use    Smoking status: Current Some Day Smoker     Types: Pipe, Cigars    Smokeless tobacco: Never Used   Substance and Sexual Activity    Alcohol use:  Yes     Alcohol/week: 0.0 standard drinks     Comment: occasional    Drug use: No    Sexual activity: Not on file   Other Topics Concern    Not on file   Social History Narrative    Not on vehicles or machinery. Do not use in combination with alcohol. 8 tablet 0    hydroCHLOROthiazide (MICROZIDE) 12.5 MG capsule Take 12.5 mg by mouth daily      HYDROcodone-acetaminophen (NORCO) 5-325 MG per tablet       albuterol sulfate HFA (PROVENTIL HFA) 108 (90 Base) MCG/ACT inhaler Inhale 2 puffs into the lungs every 4 hours as needed for Wheezing or Shortness of Breath (Space out to every 6 hours as symptoms improve) Space out to every 6 hours as symptoms improve. 1 each 0    lisinopril (PRINIVIL;ZESTRIL) 10 MG tablet Take 1 tablet by mouth daily 14 tablet 0    lisinopril (PRINIVIL;ZESTRIL) 10 MG tablet Take 1 tablet by mouth daily 30 tablet 1    ibuprofen (IBU) 800 MG tablet Take 1 tablet by mouth every 8 hours as needed for Pain. 20 tablet 0    Pseudoephedrine HCl (SINUS & ALLERGY 12 HOUR PO) Take  by mouth.  naproxen (NAPROSYN) 500 MG tablet Take 1 tablet by mouth 2 times daily for 10 days. 20 tablet 0    Lansoprazole (PREVACID PO) Take  by mouth.  DiphenhydrAMINE HCl (BENADRYL ALLERGY PO) Take  by mouth. Allergies   Allergen Reactions    Azithromycin Nausea And Vomiting    Ambien [Zolpidem Tartrate]     Codeine Other (See Comments)     dizziness    Procaine Hcl      REVIEW OF SYSTEMS    6 systems reviewed, pertinent positives per HPI otherwise noted to be negative    PHYSICAL EXAM  BP (!) 146/85   Pulse 83   Temp 99.1 °F (37.3 °C) (Oral)   Resp 18   SpO2 96%   GENERAL APPEARANCE: Awake and alert. Cooperative. No acute distress. HEAD: Normocephalic. Atraumatic. EYES: EOM's grossly intact. ENT: Mucous membranes are moist.   NECK: Supple. Normal ROM. CHEST: Equal symmetric chest rise. Regular rate and rhythm. LUNGS: Breathing is unlabored. Speaking comfortably in full sentences. Abdomen: Non-distended.    EXTREMITIES: Tenderness to palpation of the left foot over the dorsal aspect where the incision is, there is erythema surrounding the incision and some dried desiccated scabbing. No bruising, no edema, no acute deformities noted, no crepitus palpation, pedal pulse +2, capillary refill less than 3 seconds, neurologically intact, full sensation is intact, has full flexion and extension of the left foot. ROM to the left ankle and foot is slightly limited with flexion and extension due to pain. Patient does have positive Homans' sign. There is pain to palpation of the left posterior calf. There is some swelling noted in the left lower extremity, mildly greater than right. There is no warmth, no erythema to the posterior calf. No palpable cords. SKIN: Warm and dry. Incision to the left foot as above. There is no areas of induration or fluctuance to palpation of the left foot incision, this is well approximated but does have some dried desiccated scabbing. NEUROLOGICAL: Alert and oriented. Sensation is limited to light touch due to the removal of the nerve for his surgery. SKIN: Warm and dry. NEUROLOGICAL: Alert and oriented. Strength is 5/5 in all extremities and sensation is intact. LABS  Results for orders placed or performed during the hospital encounter of 11/29/21   CBC Auto Differential   Result Value Ref Range    WBC 7.2 4.0 - 11.0 K/uL    RBC 5.20 4. 20 - 5.90 M/uL    Hemoglobin 15.3 13.5 - 17.5 g/dL    Hematocrit 45.5 40.5 - 52.5 %    MCV 87.4 80.0 - 100.0 fL    MCH 29.4 26.0 - 34.0 pg    MCHC 33.6 31.0 - 36.0 g/dL    RDW 13.1 12.4 - 15.4 %    Platelets 355 792 - 786 K/uL    MPV 7.9 5.0 - 10.5 fL    Neutrophils % 70.7 %    Lymphocytes % 20.0 %    Monocytes % 5.4 %    Eosinophils % 2.7 %    Basophils % 1.2 %    Neutrophils Absolute 5.1 1.7 - 7.7 K/uL    Lymphocytes Absolute 1.4 1.0 - 5.1 K/uL    Monocytes Absolute 0.4 0.0 - 1.3 K/uL    Eosinophils Absolute 0.2 0.0 - 0.6 K/uL    Basophils Absolute 0.1 0.0 - 0.2 K/uL   Comprehensive Metabolic Panel w/ Reflex to MG   Result Value Ref Range    Sodium 135 (L) 136 - 145 mmol/L    Potassium reflex Magnesium 4.2 3.5 - 5.1 mmol/L    Chloride 102 99 - 110 mmol/L    CO2 21 21 - 32 mmol/L    Anion Gap 12 3 - 16    Glucose 120 (H) 70 - 99 mg/dL    BUN 13 7 - 20 mg/dL    CREATININE 0.7 (L) 0.9 - 1.3 mg/dL    GFR Non-African American >60 >60    GFR African American >60 >60    Calcium 9.8 8.3 - 10.6 mg/dL    Total Protein 7.5 6.4 - 8.2 g/dL    Albumin 4.7 3.4 - 5.0 g/dL    Albumin/Globulin Ratio 1.7 1.1 - 2.2    Total Bilirubin 1.7 (H) 0.0 - 1.0 mg/dL    Alkaline Phosphatase 74 40 - 129 U/L    ALT 33 10 - 40 U/L    AST 19 15 - 37 U/L   Lactate, Sepsis   Result Value Ref Range    Lactic Acid, Sepsis 2.3 (H) 0.4 - 1.9 mmol/L   Lactate, Sepsis   Result Value Ref Range    Lactic Acid, Sepsis 0.6 0.4 - 1.9 mmol/L       RADIOLOGY  XR CHEST (2 VW)    Result Date: 11/14/2021  EXAMINATION: TWO XRAY VIEWS OF THE CHEST 11/14/2021 8:17 pm COMPARISON: 11/09/2021 chest radiograph HISTORY: ORDERING SYSTEM PROVIDED HISTORY: CAP TECHNOLOGIST PROVIDED HISTORY: Reason for exam:->CAP Reason for Exam: cough, pt states his symptoms are worsening Acuity: Acute Type of Exam: Initial FINDINGS: The cardiomediastinal silhouette is normal in size and contour. No focal airspace disease. No pleural effusion or pneumothorax. No evidence of acute osseous abnormality. No evidence of acute cardiopulmonary disease. Previously described bilateral peripheral linear opacities may have represented atelectasis. XR TIBIA FIBULA LEFT (2 VIEWS)    Result Date: 11/29/2021  EXAMINATION: 2 XRAY VIEWS OF THE LEFT TIBIA AND FIBULA 11/29/2021 11:00 am COMPARISON: None.  HISTORY: ORDERING SYSTEM PROVIDED HISTORY: left leg pain TECHNOLOGIST PROVIDED HISTORY: Reason for exam:->left leg pain Reason for Exam: Post-op Problem (L foot surgery 3 weeks ago at OSH for brown neuroma and is now expierencing pain and swelling and concerned for infection) Acuity: Acute Type of Exam: Ongoing FINDINGS: Portions of the distal tibia/fibula have not been included on the examination but are visualized on the foot examination performed at the same sitting. There is very minimal degenerative spurring about the knee. No other significant bone or joint abnormality is identified. No evidence of bone destruction or periosteal reaction to suggest plain film findings of osteomyelitis. No evidence of acute fracture or dislocation. No evidence of acute osseous abnormality. XR FOOT LEFT (MIN 3 VIEWS)    Result Date: 11/29/2021  EXAMINATION: THREE XRAY VIEWS OF THE LEFT FOOT 11/29/2021 11:00 am COMPARISON: None. HISTORY: ORDERING SYSTEM PROVIDED HISTORY: surgery, infection TECHNOLOGIST PROVIDED HISTORY: Reason for exam:->surgery, infection Reason for Exam: Post-op Problem (L foot surgery 3 weeks ago at OSH for brown neuroma and is now expierencing pain and swelling and concerned for infection) Acuity: Acute Type of Exam: Ongoing FINDINGS: Horizontally oriented lucency at the superior calcaneus is indeterminate. Otherwise no appreciable acute fracture. No soft tissue gas seen with special attention to the area of surgery for reported Brown's neuroma. No acute findings at the area of interest in the expected location of a Brown's neuroma. Horizontal lucency at the superior calcaneus seen on the lateral view. Clinical correlation with point tenderness for nondisplaced fracture versus artifact. Correlate with CT if indicated.      VL Extremity Venous Left    Result Date: 11/29/2021  Vascular Lower Extremities DVT Study Procedure -- PRELIMINARY SONOGRAPHER REPORT --   Demographics   Patient Name       Bethany Lopez   Date of Study      11/29/2021        Gender              Male   Patient Number     [id number]        Date of Birth       1980   Visit Number       [id number]         Age                 42 year(s)   Accession Number   [id number]        Room Number         15   Corporate ID       [id number]          Sonographer         Rivera Meier CECILIA, RVT   Ordering Physician Michelle Perry, NP Interpreting        [facility]                                       Physician           Readers  Procedure Type of Study:   Veins:Lower Extremities DVT Study, VL EXTREMITY VENOUS DUPLEX LEFT. Tech Comments Right No evidence of deep vein thrombosis involving the right common femoral vein. Left No evidence of deep vein or superficial vein thrombosis involving the left lower extremity. XR CHEST PORTABLE    Result Date: 11/9/2021  EXAMINATION: ONE XRAY VIEW OF THE CHEST 11/9/2021 7:27 pm COMPARISON: 08/06/2016 HISTORY: ORDERING SYSTEM PROVIDED HISTORY: SOB, CP TECHNOLOGIST PROVIDED HISTORY: Reason for exam:->SOB, CP Reason for Exam: sob, cp Acuity: Acute Type of Exam: Initial FINDINGS: Cardiomediastinal silhouette within normal limits. Bilateral peripheral linear opacities. No pneumothorax. No pleural effusion. Bilateral peripheral linear opacities could represent atypical pneumonia or edema     CT FOOT LEFT WO CONTRAST    Result Date: 11/29/2021  EXAMINATION: CT OF THE LEFT FOOT WITHOUT CONTRAST 11/29/2021 11:48 am TECHNIQUE: CT of the left foot was performed without the administration of intravenous contrast.  Multiplanar reformatted images are provided for review. Dose modulation, iterative reconstruction, and/or weight based adjustment of the mA/kV was utilized to reduce the radiation dose to as low as reasonably achievable. COMPARISON: Left foot radiographs from earlier today.  HISTORY ORDERING SYSTEM PROVIDED HISTORY: Pain in left foot and difficulty walking, lucency seen on to calcaneous Xray-fracture vs artifact TECHNOLOGIST PROVIDED HISTORY: Reason for exam:->Pain in left foot and difficulty walking, lucency seen on to calcaneous Xray-fracture vs artifact Decision Support Exception - unselect if not a suspected or confirmed emergency medical condition->Emergency Medical Condition (MA) Reason for Exam: post op foot surg x3 weeks ago; infection after stitches were removed, c/o pain going up left leg this am and pain in left foot x1 week Acuity: Acute Type of Exam: Initial FINDINGS: Limited exam secondary to lack of IV contrast.  Within this limitation: Bones: Prominent trabecular markings are seen at the area of interest within the calcaneus. The tarsometatarsal alignment appears anatomic on this nonweightbearing study. No fracture or malalignment. The cortical margins appear intact. Multiple sclerotic foci within the midfoot likely represent benign bone islands. Soft Tissue: Limited exam secondary to lack of IV contrast.  No focal drainable fluid collection. No large retracted tendon tear visualized within the limitations of CT. There is soft tissue edema of the midfoot which is dorsal predominant, possibly postsurgical.     No acute osseous abnormality. ED COURSE/MDM  Patient seen and evaluated. Old records reviewed. Diagnostic testing reviewed and results discussed. I have evaluated this patient. My supervising physician was available for consultation. Roopa Rdz presented to the ED today with above noted complaints. Arrival vital signs: Afebrile with a temperature of 99.1, hemodynamically stable except for blood pressure elevated at 170/111. Physical exam performed at 1055: There is tenderness to palpation around the incision of the left foot, left foot incision is well approximated but there is erythema to the periwound without fluctuance or induration. Incision does look well approximated but there is dried desiccated scabbing. I was unable to push on it to express any pus like drainage. Blood work: Without evidence of systemic infection. No anemia. No significant electrolyte abnormality. No evidence of acute kidney injury or transaminitis. Lactic acid levels elevated at 2.3. As he does not have abnormalities in his CBC. I gave him 1 L IV fluid bolus and lactic normalized to 0.6.   Imaging: X-ray of the left foot shows no acute findings at the area of interest in the expected location of a Moore's neuroma. There is a horizontal lucency at the superior calcaneus seen on the lateral view. Clinical correlation with point tenderness for nondisplaced fracture versus artifact. Correlate with CT if indicated. Ultrasound of the left lower extremity is negative for DVT. X-ray of the left tibia and fibula is without acute findings. CT of the left foot without acute findings. No fracture seen, lucency likely due to artifact that was seen on x-ray. Consults: I consulted Dain Cotton the patient's surgeon. I was unable to speak with her but I did speak with her assistant and let her know patient's presenting complaints, physical exam, diagnostic findings and ED visit thus far. She was reaching out to the physician and would have her call me back. I did not receive any follow-up phone calls. I reconsulted the office and spoke with Dr. Alla Holm, he was made aware of the above. He is in agreement with discharging patient and that he could follow-up in the office sometime this week. Prior to the return phone call from the on-call physician I had given the patient the option of staying to wait for the return call versus going home and I could contact him if I heard back. Patient opted to go ahead and go home at this time. I did try to attempt to call the patient and the phone number listed in his chart is not active. Patient had been instructed to call his surgeon's office tomorrow to discuss this ED visit with her. I did offer the patient crutches so he could remain nonweightbearing but he states he has that at home. I did discharge him with a short prescription for medication to help with pain control. I do feel the patient should remain on his antibiotics until otherwise advised by his surgeon.     Medications given in the ED:   Medications   ondansetron (ZOFRAN) injection 4 mg (4 mg IntraVENous Given 11/29/21 1320)   0.9 % sodium chloride bolus (0 mLs IntraVENous Stopped 11/29/21 1441)   oxyCODONE-acetaminophen (PERCOCET) 5-325 MG per tablet 1 tablet (1 tablet Oral Given 11/29/21 1320)   oxyCODONE-acetaminophen (PERCOCET) 5-325 MG per tablet 1 tablet (1 tablet Oral Given 11/29/21 1647)      Please refer to AVS for further details of the discharge instructions. A discussion was had with the patient regarding diagnosis, diagnostic testing results, treatment/ plan of care, and follow up. All questions were answered. Patient will follow up as directed for further evaluation/treatment. The patient was given strict return precautions as we discussed symptoms that would necessitate return to the ED. Patient will return to ED for new/worsening symptoms. The patient verbalized their understanding and agreement with the above plan. Patient was sent home with a prescription for below medication/s. I did [place] patient on appropriate use of these medication. Discharge Medication List as of 11/29/2021  4:14 PM      START taking these medications    Details   oxyCODONE-acetaminophen (PERCOCET) 5-325 MG per tablet Take 1-2 tablets by mouth every 6 hours as needed for Pain for up to 3 days. WARNING:  May cause drowsiness. May impair ability to operate vehicles or machinery. Do not use in combination with alcohol., Disp-8 tablet, R-0Print             I estimate there is LOW risk for COMPARTMENT SYNDROME, DEEP VENOUS THROMBOSIS, SEPTIC ARTHRITIS, TENDON OR NEUROVASCULAR INJURY, thus I consider the discharge disposition reasonable. Keny Bains and I have discussed the diagnosis and risks, and we agree with discharging home to follow-up with their primary doctor or the referral orthopedist. We also discussed returning to the Emergency Department immediately if new or worsening symptoms occur.  We have discussed the symptoms which are most concerning (e.g., changing or worsening pain, numbness, weakness) that necessitate immediate return. Clinical Impression    1. Pain and swelling of left lower leg    2. Left foot pain        Blood pressure (!) 146/85, pulse 83, temperature 99.1 °F (37.3 °C), temperature source Oral, resp. rate 18, SpO2 96 %. FOLLOW UP  Melodie Kyle,   [address]  [phone]    Call in 1 day  For further evaluation    MD Nicolle Paz[address]  [phone]    Call in 1 day  For further evaluation    [facility]  [facility] [address]  [phone]  Go to   If symptoms worsen      DISPOSITION  Patient was discharged to home in good condition. Comment: Please note this report has been produced using speech recognition software and may contain errors related to that system including errors in grammar, punctuation, and spelling, as well as words and phrases that may be inappropriate. If there are any questions or concerns please feel free to contact the dictating provider for clarification.                       FLORES Roman - ED  11/29/21 5176

## 2021-11-29 NOTE — ED NOTES
Bed: 25  Expected date:   Expected time:   Means of arrival:   Comments:  [facility] [address], RN  11/29/21 4331

## 2021-11-29 NOTE — ED NOTES
5 - called [facility] per consult  Re: pain after surgery  1346 - SHERLY Gaitan picked up call to speak with NP Maycol Smith regarding pt on behalf of Dr Nicol Perez again since no call back from Dr Waqas Mims. 6562 - called Dr Sherwin Landau, the on-call MD for Dr Waqas Mims.  No answer, left   6871 - Dr Sherwin Landau called back to speak with NP Willard Morgan  11/29/21 7105

## 2021-12-16 ENCOUNTER — HOSPITAL ENCOUNTER (EMERGENCY)
Age: 41
Discharge: HOME | End: 2021-12-17
Attending: EMERGENCY MEDICINE
Payer: MEDICAID

## 2021-12-16 DIAGNOSIS — F39 MOOD DISORDER (HCC): ICD-10-CM

## 2021-12-16 DIAGNOSIS — R03.0 ELEVATED BLOOD PRESSURE READING: ICD-10-CM

## 2021-12-16 DIAGNOSIS — F32.A DEPRESSION, UNSPECIFIED DEPRESSION TYPE: Primary | ICD-10-CM

## 2021-12-16 PROCEDURE — 80143 DRUG ASSAY ACETAMINOPHEN: CPT

## 2021-12-16 PROCEDURE — 80179 DRUG ASSAY SALICYLATE: CPT

## 2021-12-16 PROCEDURE — 99284 EMERGENCY DEPT VISIT MOD MDM: CPT

## 2021-12-16 PROCEDURE — 85025 COMPLETE CBC W/AUTO DIFF WBC: CPT

## 2021-12-16 PROCEDURE — 81003 URINALYSIS AUTO W/O SCOPE: CPT

## 2021-12-16 PROCEDURE — 80307 DRUG TEST PRSMV CHEM ANLYZR: CPT

## 2021-12-16 PROCEDURE — 83690 ASSAY OF LIPASE: CPT

## 2021-12-16 PROCEDURE — 80053 COMPREHEN METABOLIC PANEL: CPT

## 2021-12-16 PROCEDURE — 82077 ASSAY SPEC XCP UR&BREATH IA: CPT

## 2021-12-16 PROCEDURE — 83735 ASSAY OF MAGNESIUM: CPT

## 2021-12-16 ASSESSMENT — PAIN SCALES - GENERAL: PAINLEVEL_OUTOF10: 3

## 2021-12-16 ASSESSMENT — PAIN DESCRIPTION - LOCATION: LOCATION: NECK

## 2021-12-16 ASSESSMENT — PAIN DESCRIPTION - PAIN TYPE: TYPE: ACUTE PAIN

## 2021-12-17 VITALS
TEMPERATURE: 97.7 F | HEART RATE: 100 BPM | DIASTOLIC BLOOD PRESSURE: 75 MMHG | OXYGEN SATURATION: 97 % | SYSTOLIC BLOOD PRESSURE: 128 MMHG | WEIGHT: 225 LBS | RESPIRATION RATE: 16 BRPM | HEIGHT: 63 IN | BODY MASS INDEX: 39.87 KG/M2

## 2021-12-17 LAB
A/G RATIO: 2 (ref 1.1–2.2)
ACETAMINOPHEN LEVEL: <5 UG/ML (ref 10–30)
ALBUMIN SERPL-MCNC: 5 G/DL (ref 3.4–5)
ALP BLD-CCNC: 79 U/L (ref 40–129)
ALT SERPL-CCNC: 30 U/L (ref 10–40)
AMPHETAMINE SCREEN, URINE: NORMAL
ANION GAP SERPL CALCULATED.3IONS-SCNC: 14 MMOL/L (ref 3–16)
AST SERPL-CCNC: 16 U/L (ref 15–37)
BARBITURATE SCREEN URINE: NORMAL
BASOPHILS ABSOLUTE: 0.1 K/UL (ref 0–0.2)
BASOPHILS RELATIVE PERCENT: 0.9 %
BENZODIAZEPINE SCREEN, URINE: NORMAL
BILIRUB SERPL-MCNC: 1.5 MG/DL (ref 0–1)
BILIRUBIN URINE: NEGATIVE
BLOOD, URINE: NEGATIVE
BUN BLDV-MCNC: 18 MG/DL (ref 7–20)
CALCIUM SERPL-MCNC: 9.8 MG/DL (ref 8.3–10.6)
CANNABINOID SCREEN URINE: NORMAL
CHLORIDE BLD-SCNC: 99 MMOL/L (ref 99–110)
CLARITY: CLEAR
CO2: 24 MMOL/L (ref 21–32)
COCAINE METABOLITE SCREEN URINE: NORMAL
COLOR: YELLOW
CREAT SERPL-MCNC: 0.9 MG/DL (ref 0.9–1.3)
EOSINOPHILS ABSOLUTE: 0.1 K/UL (ref 0–0.6)
EOSINOPHILS RELATIVE PERCENT: 1.7 %
ETHANOL: NORMAL MG/DL (ref 0–0.08)
GFR AFRICAN AMERICAN: >60
GFR NON-AFRICAN AMERICAN: >60
GLUCOSE BLD-MCNC: 153 MG/DL (ref 70–99)
GLUCOSE URINE: NEGATIVE MG/DL
HCT VFR BLD CALC: 40.4 % (ref 40.5–52.5)
HEMOGLOBIN: 13.9 G/DL (ref 13.5–17.5)
INFLUENZA A: NOT DETECTED
INFLUENZA B: NOT DETECTED
KETONES, URINE: ABNORMAL MG/DL
LEUKOCYTE ESTERASE, URINE: NEGATIVE
LIPASE: 51 U/L (ref 13–60)
LYMPHOCYTES ABSOLUTE: 1.4 K/UL (ref 1–5.1)
LYMPHOCYTES RELATIVE PERCENT: 17.5 %
Lab: NORMAL
MAGNESIUM: 2.2 MG/DL (ref 1.8–2.4)
MCH RBC QN AUTO: 29.2 PG (ref 26–34)
MCHC RBC AUTO-ENTMCNC: 34.4 G/DL (ref 31–36)
MCV RBC AUTO: 85 FL (ref 80–100)
METHADONE SCREEN, URINE: NORMAL
MICROSCOPIC EXAMINATION: ABNORMAL
MONOCYTES ABSOLUTE: 0.5 K/UL (ref 0–1.3)
MONOCYTES RELATIVE PERCENT: 6.4 %
NEUTROPHILS ABSOLUTE: 5.9 K/UL (ref 1.7–7.7)
NEUTROPHILS RELATIVE PERCENT: 73.5 %
NITRITE, URINE: NEGATIVE
OPIATE SCREEN URINE: NORMAL
OXYCODONE URINE: NORMAL
PDW BLD-RTO: 12.9 % (ref 12.4–15.4)
PH UA: 5.5
PH UA: 5.5 (ref 5–8)
PHENCYCLIDINE SCREEN URINE: NORMAL
PLATELET # BLD: 334 K/UL (ref 135–450)
PMV BLD AUTO: 7.8 FL (ref 5–10.5)
POTASSIUM REFLEX MAGNESIUM: 3.3 MMOL/L (ref 3.5–5.1)
PROPOXYPHENE SCREEN: NORMAL
PROTEIN UA: NEGATIVE MG/DL
RBC # BLD: 4.76 M/UL (ref 4.2–5.9)
SALICYLATE, SERUM: <0.3 MG/DL (ref 15–30)
SARS-COV-2 RNA, RT PCR: NOT DETECTED
SODIUM BLD-SCNC: 137 MMOL/L (ref 136–145)
SPECIFIC GRAVITY UA: >=1.03 (ref 1–1.03)
TOTAL PROTEIN: 7.5 G/DL (ref 6.4–8.2)
URINE TYPE: ABNORMAL
UROBILINOGEN, URINE: 0.2 E.U./DL
WBC # BLD: 8 K/UL (ref 4–11)

## 2021-12-17 PROCEDURE — 87636 SARSCOV2 & INF A&B AMP PRB: CPT

## 2021-12-17 RX ORDER — MELOXICAM 15 MG/1
15 TABLET ORAL DAILY
COMMUNITY
Start: 2021-08-27

## 2021-12-17 RX ORDER — DICYCLOMINE HYDROCHLORIDE 10 MG/1
10 CAPSULE ORAL 3 TIMES DAILY PRN
COMMUNITY
Start: 2021-08-27

## 2021-12-17 RX ORDER — LOSARTAN POTASSIUM AND HYDROCHLOROTHIAZIDE 12.5; 1 MG/1; MG/1
1 TABLET ORAL DAILY
COMMUNITY
Start: 2021-09-08

## 2021-12-17 RX ORDER — GABAPENTIN 300 MG/1
300 CAPSULE ORAL 3 TIMES DAILY PRN
COMMUNITY
Start: 2021-12-03

## 2021-12-17 ASSESSMENT — PAIN SCALES - GENERAL: PAINLEVEL_OUTOF10: 0

## 2021-12-17 NOTE — ED NOTES
Patient moved to RM B2. Patient ambulated with steady even gait. Patient calm and cooperative. Patients shoes, and necklace placed in locker 2.       Renetta Hernandez RN  12/17/21 0870

## 2021-12-17 NOTE — ED PROVIDER NOTES
Emergency Physician Note  [address] ED  288 [facility] [place]. [address]  Dept: [phone]  Loc: [phone]  Open Note Time:  11:59 PM EST    Chief Complaint  Suicidal (pt stated to GF, \"I don't care if I live or die\" pt took a couple of muscle relaxers and GF flagged down an officer)       History of Present Illness  John Macias is a 42 y.o. male  has a past medical history of Migraine, Pneumonia, and Seizures ([address] who presents to the ED for psychiatric evaluation. Patient had gotten into an argument with a significant other. At that point he made the statement that \"is better off not being here\". He then took a muscle relaxer, at this point this upset his friend enough that she called the police. The police brought him into put him on a psychiatric hold. He denies ever feeling suicidal, he admits that maybe taking a muscle relaxer with alcohol may not of been the safest method of dealing with stress. At this time he denies any auditory/visual hallucinations    Denies fever,   chest pain, shortness of breath, cough, abdominal pain, nausea, vomiting after giving 1 second. No palliative/provocative factors. Unless otherwise stated in this report or unable to obtain because of the patient's clinical or mental status as evidenced by the medical record, this patient's positive and negative responses for review of systems, constitutional, psych, eyes, ENT, cardiovascular, respiratory, gastrointestinal, neurological, genitourinary, musculoskeletal, integument systems and systems related to the presenting problem are either stated in the preceding paragraph or were not pertinent or were negative for the symptoms and/or complaints related to the medical problem. I have reviewed the following from the nursing documentation:      Prior to Admission medications    Medication Sig Start Date End Date Taking?  Authorizing Provider   gabapentin (NEURONTIN) 100 MG capsule TAKE TWO CAPSULES BY MOUTH THREE TIMES A DAY 7/16/21   Historical Provider, MD   hydroCHLOROthiazide (MICROZIDE) 12.5 MG capsule Take 12.5 mg by mouth daily    Historical Provider, MD   HYDROcodone-acetaminophen ([facility]) 5-325 MG per tablet  11/5/21   Historical Provider, MD   albuterol sulfate HFA (PROVENTIL HFA) 108 (90 Base) MCG/ACT inhaler Inhale 2 puffs into the lungs every 4 hours as needed for Wheezing or Shortness of Breath (Space out to every 6 hours as symptoms improve) Space out to every 6 hours as symptoms improve. 11/9/21   FLORES Obando CNP   lisinopril (PRINIVIL;ZESTRIL) 10 MG tablet Take 1 tablet by mouth daily 3/7/16   FLORES Lauren CNP   lisinopril (PRINIVIL;ZESTRIL) 10 MG tablet Take 1 tablet by mouth daily 9/15/15   FLORES Lauren CNP   ibuprofen (IBU) 800 MG tablet Take 1 tablet by mouth every 8 hours as needed for Pain. 2/18/14   Luz Elena Day PA-C   Pseudoephedrine HCl (SINUS & ALLERGY 12 HOUR PO) Take  by mouth. Historical Provider, MD   naproxen (NAPROSYN) 500 MG tablet Take 1 tablet by mouth 2 times daily for 10 days. 9/4/13 9/14/13  Amos Blanc MD   Lansoprazole (PREVACID PO) Take  by mouth. Historical Provider, MD   DiphenhydrAMINE HCl (BENADRYL ALLERGY PO) Take  by mouth. Historical Provider, MD       Allergies as of 12/16/2021 - Fully Reviewed 12/16/2021   Allergen Reaction Noted    Azithromycin Nausea And Vomiting 11/28/2012    Ambien [zolpidem tartrate]  10/09/2012    Codeine Other (See Comments) 10/09/2012    Procaine hcl  10/09/2012       Past Medical History:   Diagnosis Date    Migraine     Pneumonia     Seizures ([facility] [address]     temporal seizures as a kid        Surgical History:   Past Surgical History:   Procedure Laterality Date    FOOT SURGERY      HAND SURGERY      KNEE ARTHROSCOPY          Family History:  History reviewed. No pertinent family history.     Social History     Socioeconomic History  Marital status:      Spouse name: Not on file    Number of children: Not on file    Years of education: Not on file    Highest education level: Not on file   Occupational History    Not on file   Tobacco Use    Smoking status: Current Some Day Smoker     Types: Pipe, Cigars    Smokeless tobacco: Never Used   Substance and Sexual Activity    Alcohol use: Yes     Alcohol/week: 0.0 standard drinks     Comment: occasional    Drug use: No    Sexual activity: Not on file   Other Topics Concern    Not on file   Social History Narrative    Not on file     Social Determinants of Health     Financial Resource Strain:     Difficulty of Paying Living Expenses: Not on file   Food Insecurity:     Worried About Running Out of Food in the Last Year: Not on file    Anastasiia of Food in the Last Year: Not on file   Transportation Needs:     Lack of Transportation (Medical): Not on file    Lack of Transportation (Non-Medical): Not on file   Physical Activity:     Days of Exercise per Week: Not on file    Minutes of Exercise per Session: Not on file   Stress:     Feeling of Stress : Not on file   Social Connections:     Frequency of Communication with Friends and Family: Not on file    Frequency of Social Gatherings with Friends and Family: Not on file    Attends Alevism Services: Not on file    Active Member of [address] [place] [address] or Organizations: Not on file    Attends Club or Organization Meetings: Not on file    Marital Status: Not on file   Intimate Partner Violence:     Fear of Current or Ex-Partner: Not on file    Emotionally Abused: Not on file    Physically Abused: Not on file    Sexually Abused: Not on file   Housing Stability:     Unable to Pay for Housing in the Last Year: Not on file    Number of [place] in the Last Year: Not on file    Unstable Housing in the Last Year: Not on file       Nursing notes reviewed.     ED Triage Vitals [12/16/21 6630]   Enc Vitals Group      BP (!) 176/112 Pulse 114      Resp 14      Temp 100 °F (37.8 °C)      Temp Source Oral      SpO2 98 %      Weight 225 lb (102.1 kg)      Height 5' 3\" (1.6 m)      Head Circumference       Peak Flow       Pain Score       Pain Loc       Pain Edu? Excl. in [address]? GENERAL:   Body mass index is 39.86 kg/m². Awake, alert. Well developed, well nourished with no apparent distress. Nontoxic-appearing, non-ill-appearing. HENT:   Normocephalic, Atraumatic, no lacerations. No ENT exam due to PPE. EYES:   Conjunctiva normal,   Pupils equal round and reactive to light,   Extraocular movements normal.  NECK:  Trachea is midline. No stridor. CHEST:  Regular rate and regular rhythm, no murmurs/rubs/gallops,  normal S1/S2, chest wall non-tender. LUNGS:  No respiratory distress. No abdominal retractions, no sternal retractions  Clear to auscultation bilaterally, no wheezing, no rhochi, no rales  Speaking comfortably in full sentences  ABDOMEN:  Soft, non-tender, non-distended. No guarding. No rebound. EXTREMITIES:  Moves extremities x4 with purpose. Normal range of motion, no edema,  No tenderness, no deformity,  distal pulses present and equal bilaterally. SKIN:  Warm, dry and intact. NEUROLOGIC:  Normal mental status. Moving all extremities to command. Alert and oriented x4  without focal motor deficit or gross sensory deficit. Normal speech. PSYCHIATRIC:  Not anxious,  normal mood and affect,  Appropriate eye contact,  thoughts are linear and organized,  without delusions/hallucinations,  Not responding to internal stimuli,  responds appropriately to questions  Denies SI/HI    LABS and DIAGNOSTIC RESULTS      RADIOLOGY  X-RAYS:  I have reviewed radiologic plain film image(s). ALL OTHER NON-PLAIN FILM IMAGES SUCH AS CT, ULTRASOUND AND MRI HAVE BEEN READ BY THE RADIOLOGIST.   No orders to display        LABS  Results for orders placed or performed during the hospital encounter of 12/16/21   COVID-19 & Influenza Combo    Specimen: Nasopharyngeal Swab   Result Value Ref Range    SARS-CoV-2 RNA, RT PCR NOT DETECTED NOT DETECTED    INFLUENZA A NOT DETECTED NOT DETECTED    INFLUENZA B NOT DETECTED NOT DETECTED   CBC Auto Differential   Result Value Ref Range    WBC 8.0 4.0 - 11.0 K/uL    RBC 4.76 4.20 - 5.90 M/uL    Hemoglobin 13.9 13.5 - 17.5 g/dL    Hematocrit 40.4 (L) 40.5 - 52.5 %    MCV 85.0 80.0 - 100.0 fL    MCH 29.2 26.0 - 34.0 pg    MCHC 34.4 31.0 - 36.0 g/dL    RDW 12.9 12.4 - 15.4 %    Platelets 603 330 - 003 K/uL    MPV 7.8 5.0 - 10.5 fL    Neutrophils % 73.5 %    Lymphocytes % 17.5 %    Monocytes % 6.4 %    Eosinophils % 1.7 %    Basophils % 0.9 %    Neutrophils Absolute 5.9 1.7 - 7.7 K/uL    Lymphocytes Absolute 1.4 1.0 - 5.1 K/uL    Monocytes Absolute 0.5 0.0 - 1.3 K/uL    Eosinophils Absolute 0.1 0.0 - 0.6 K/uL    Basophils Absolute 0.1 0.0 - 0.2 K/uL   Comprehensive Metabolic Panel w/ Reflex to MG   Result Value Ref Range    Sodium 137 136 - 145 mmol/L    Potassium reflex Magnesium 3.3 (L) 3.5 - 5.1 mmol/L    Chloride 99 99 - 110 mmol/L    CO2 24 21 - 32 mmol/L    Anion Gap 14 3 - 16    Glucose 153 (H) 70 - 99 mg/dL    BUN 18 7 - 20 mg/dL    CREATININE 0.9 0.9 - 1.3 mg/dL    GFR Non-African American >60 >60    GFR African American >60 >60    Calcium 9.8 8.3 - 10.6 mg/dL    Total Protein 7.5 6.4 - 8.2 g/dL    Albumin 5.0 3.4 - 5.0 g/dL    Albumin/Globulin Ratio 2.0 1.1 - 2.2    Total Bilirubin 1.5 (H) 0.0 - 1.0 mg/dL    Alkaline Phosphatase 79 40 - 129 U/L    ALT 30 10 - 40 U/L    AST 16 15 - 37 U/L   Lipase   Result Value Ref Range    Lipase 51.0 13.0 - 60.0 U/L   Urinalysis, reflex to microscopic   Result Value Ref Range    Color, UA Yellow Straw/Yellow    Clarity, UA Clear Clear    Glucose, Ur Negative Negative mg/dL    Bilirubin Urine Negative Negative    Ketones, Urine TRACE (A) Negative mg/dL    Specific Gravity, UA >=1.030 1.005 - 1.030    Blood, Urine Negative Negative    pH, UA 5.5 5.0 - 8.0    Protein, UA Negative Negative mg/dL    Urobilinogen, Urine 0.2 <2.0 E.U./dL    Nitrite, Urine Negative Negative    Leukocyte Esterase, Urine Negative Negative    Microscopic Examination Not Indicated     Urine Type NotGiven    Ethanol   Result Value Ref Range    Ethanol Lvl None Detected mg/dL   Drug screen multi urine   Result Value Ref Range    Amphetamine Screen, Urine Neg Negative <1000ng/mL    Barbiturate Screen, Ur Neg Negative <200 ng/mL    Benzodiazepine Screen, Urine Neg Negative <200 ng/mL    Cannabinoid Scrn, Ur Neg Negative <50 ng/mL    Cocaine Metabolite Screen, Urine Neg Negative <300 ng/mL    Opiate Scrn, Ur Neg Negative <300 ng/mL    PCP Screen, Urine Neg Negative <25 ng/mL    Methadone Screen, Urine Neg Negative <300 ng/mL    Propoxyphene Scrn, Ur Neg Negative <300 ng/mL    Oxycodone Urine Neg Negative <100 ng/ml    pH, UA 5.5     Drug Screen Comment: see below    Acetaminophen level   Result Value Ref Range    Acetaminophen Level <5 (L) 10 - 30 ug/mL   Salicylate   Result Value Ref Range    Salicylate, Serum <6.6 (L) 15.0 - 30.0 mg/dL   Magnesium   Result Value Ref Range    Magnesium 2.20 1.80 - 2.40 mg/dL       SCREENINGS  NIH Score     [facility]     [facility]    Heart Score       PROCEDURES    MEDICAL DECISION MAKING    Procedures/interventions/images ordered for this visit  Orders Placed This Encounter   Procedures    SARS-CoV-2 NAAT (Rapid)    CBC Auto Differential    Comprehensive Metabolic Panel w/ Reflex to MG    Lipase    Urinalysis, reflex to microscopic    Ethanol    Drug screen multi urine    Acetaminophen level    Salicylate       Medications ordered for this visit  No orders of the defined types were placed in this encounter.       ED course notes for this visit       I evaluated the patient in room LUGO 1A/LUGO-1A    Differential diagnoses: Drug or alcohol use or abuse, psychosis, behavioral mental illness, metabolic disturbance, infection, neurologic emergency, other    This patient presented to the ED at risk of harming themselves or others, meeting criteria to be placed on an involuntary hold. Involuntary form was completed and pt was informed that they are being placed on this hold. Pt  does not have a history of psychological problems. They are nontoxic appearing. Vital signs were reviewed and addressed. They were examined for acute injuries and illness. No gross evidence of self harm. Appropriate tests were ordered in the ED to medically clear them for transfer and/or evaluation by a behavioral health unit. There is no significant evidence of underlying medical etiology for the pts current psychological issue, such as toxidrome, CVA, SAH, cerebral tumor, acute coronary syndrome, toxicity, shock, sepsis, electrolyte imbalance, thyroid irregularity, or intoxication needing inpatient detox. I have performed a medical clearance examination on this patient. It is my opinion that no medical conditions were discovered that would preclude admission to a behavioral health unit or discharge home. I feel that the patient is medically stable for disposition by the behavioral health team at this time. The patient's blood pressure was found to be elevated according to CMS/Medicare and the Affordable Care Act/ObamaCare criteria. Elevated blood pressure could occur because of pain or anxiety or other reasons and does not mean that they need to have their blood pressure treated or medications otherwise adjusted. However, this could also be a sign that they will need to have their blood pressure treated or medications changed. The patient was instructed to follow up closely with their personal physician to have their blood pressure rechecked. The patient was instructed to take a list of recent blood pressure readings to their next visit with their personal physician. Final Impression    1. Depression, unspecified depression type    2.  Mood disorder (HCC)    3. Elevated blood pressure reading        Blood pressure 128/75, pulse 100, temperature 97.7 °F (36.5 °C), temperature source Temporal, resp. rate 16, height 5' 3\" (1.6 m), weight 225 lb (102.1 kg), SpO2 97 %. Disposition  Pt is in stable condition upon Transfer to [facility] - medically cleared for psychiatric evaluation. Pt was seen during the [place] 19 pandemic. Appropriate PPE worn by this writer during patient encounters. Pt seen during a time with constrained hospital bed capacity and other potential inpatient and outpatient resources were constrained due to the viral pandemic. The note was completed using Dragon voice recognition transcription. Every effort was made to ensure accuracy; however, inadvertent transcription errors may be present despite my best efforts to edit errors.     Jalene Curling, MD  [address] MD Josselin  12/17/21 [address] [place] [address] Arthur Martinez MD  12/17/21 5928

## 2021-12-17 NOTE — ED NOTES
Patient was discharged from the ED CHRISTINE. He was provided with discharge instructions and offered an opportunity to ask questions. He denied any questions at this time. Paperwork was signed and patient left the CHRISTINE at 0420.        Sarahy Jackson RN  12/17/21 6732

## 2021-12-17 NOTE — ED NOTES
Attempted to obtain collateral from patients father Valentino Alexandra. No answer at number provided, but voicemail left with number to return call.  Patient refused to given number of girlfriend to obtain collateral.      Demetri Jacques RN  12/17/21 2768

## 2021-12-17 NOTE — PROGRESS NOTES
Level of Care Disposition: to be discharged      Patient was seen by ED provider and [facility] staff. The case was presented to psychiatric provider on-call DESTIN Nova. Based on the ED evaluation and information presented to the provider by Angelina MARC , it is the recommendation of the on call psychiatric provider that the patient be discharged from a psychiatric standpoint with the following referrals: community mental health services, and prescription assistance cards to help Jessica Menard pay for medications that he can't afford currently.
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
## 2022-01-27 ENCOUNTER — HOSPITAL ENCOUNTER (EMERGENCY)
Age: 42
Discharge: HOME | End: 2022-01-27
Attending: STUDENT IN AN ORGANIZED HEALTH CARE EDUCATION/TRAINING PROGRAM
Payer: MEDICAID

## 2022-01-27 VITALS
RESPIRATION RATE: 14 BRPM | OXYGEN SATURATION: 96 % | DIASTOLIC BLOOD PRESSURE: 96 MMHG | SYSTOLIC BLOOD PRESSURE: 145 MMHG | HEART RATE: 75 BPM | TEMPERATURE: 98.7 F

## 2022-01-27 DIAGNOSIS — R03.0 ELEVATED BLOOD PRESSURE READING: ICD-10-CM

## 2022-01-27 DIAGNOSIS — U07.1 COVID: Primary | ICD-10-CM

## 2022-01-27 LAB
RAPID INFLUENZA  B AGN: NEGATIVE
RAPID INFLUENZA A AGN: NEGATIVE
SARS-COV-2, NAAT: DETECTED

## 2022-01-27 PROCEDURE — 87635 SARS-COV-2 COVID-19 AMP PRB: CPT

## 2022-01-27 PROCEDURE — 87804 INFLUENZA ASSAY W/OPTIC: CPT

## 2022-01-27 PROCEDURE — 99285 EMERGENCY DEPT VISIT HI MDM: CPT

## 2022-01-27 RX ORDER — ASCORBIC ACID 500 MG
500 TABLET ORAL 2 TIMES DAILY
Qty: 14 TABLET | Refills: 0 | Status: SHIPPED | OUTPATIENT
Start: 2022-01-27 | End: 2022-02-03

## 2022-01-27 RX ORDER — ZINC SULFATE 50(220)MG
50 CAPSULE ORAL DAILY
Qty: 7 CAPSULE | Refills: 0 | Status: SHIPPED | OUTPATIENT
Start: 2022-01-27 | End: 2022-02-03

## 2022-01-27 RX ORDER — GUAIFENESIN 600 MG/1
600 TABLET, EXTENDED RELEASE ORAL 2 TIMES DAILY
Qty: 20 TABLET | Refills: 0 | Status: SHIPPED | OUTPATIENT
Start: 2022-01-27 | End: 2022-02-06

## 2022-01-27 ASSESSMENT — ENCOUNTER SYMPTOMS
ABDOMINAL PAIN: 0
CHEST TIGHTNESS: 0
SHORTNESS OF BREATH: 0
VOMITING: 0
DIARRHEA: 0
COUGH: 1
SORE THROAT: 1
NAUSEA: 1

## 2022-01-27 NOTE — ED NOTES
Educated pt on x3 prescriptions and discharge paperwork as well as follow-up appointment. Pt verbalizes understanding of all instructions and denies questions. Pt left ambulatory by self with all personal belongings, and discharge paperwork to private residence. Pt in no distress at this time. Prescriptions x3 sent as E-Scripts. Pt instructed on how to  prescriptions. Pt verbalizes understanding.          Elisabet Bains RN  01/27/22 9511

## 2022-01-28 ENCOUNTER — CARE COORDINATION (OUTPATIENT)
Dept: CARE COORDINATION | Age: 42
End: 2022-01-28

## 2022-01-28 NOTE — CARE COORDINATION
Ambulatory Care Coordination ED COVID Follow up Call    Challenges to be reviewed by the provider   Additional needs identified to be addressed with provider: No  none                 Encounter was not routed to provider for escalation. Method of communication with provider: none    Discussed COVID-19 related testing which was: available at this time. Test results were: positive. Patient informed of results, if available? Yes. Current Symptoms: fatigue, pain or aching joints, cough, chills or shaking, sweating, nausea, dizziness/lightheadedness, no new symptoms, no worsening symptoms and headache, sore throat   Instructed pt to stay hydrated, rest, take Tylenol or Ibuprofen for body aches or fever and eat at least small meals. Reviewed New or Changed Meds: yes   ascorbic acid (VITAMIN C) 500 MG tablet 14 tablet 0 1/27/2022 2/3/2022    Sig - Route: Take 1 tablet by mouth 2 times daily for 7 days - Oral      guaiFENesin (MUCINEX) 600 MG extended release tablet 20 tablet 0 1/27/2022 2/6/2022    Sig - Route: Take 1 tablet by mouth 2 times daily for 10 days - Oral      zinc sulfate (ZINCATE) 220 (50 Zn) MG capsule 7 capsule 0 1/27/2022 2/3/2022    Sig - Route: Take 1 capsule by mouth daily for 7 days - Oral      Do you have what you need at home?  Durable Medical Equipment ordered at discharge: None   Home Health/Outpatient orders at discharge: none   Was patient discharged with a pulse oximeter? Yes Discussed and confirmed pulse oximeter discharge instructions and when to notify provider or seek emergency care. Pt stated his SpO2 was 98% this AM.    Patient education provided: Reviewed appropriate site of care based on symptoms and resources available to patient including: PCP and When to call 911. Follow up appointment recommended: yes. If no appointment scheduled, scheduling offered: Pt has a Non[facility] PCP. No future appointments.     Interventions: Obtained and reviewed discharge summary and/or continuity of care documents  Reviewed discharge instructions, medical action plan and red flags with patient who verbalized understanding. Plan for follow-up call in 5-7 days based on severity of symptoms and risk factors. Plan for next call: symptom management-follow up on pt's symptoms. Provided contact information for future needs.     Sonali Naik RN CCM

## 2022-02-04 ENCOUNTER — CARE COORDINATION (OUTPATIENT)
Dept: CARE COORDINATION | Age: 42
End: 2022-02-04

## 2022-02-04 NOTE — CARE COORDINATION
Patient contacted regarding COVID-19 diagnosis. Discussed COVID-19 related testing which was available at this time. Test results were positive. Patient informed of results, if available? Yes    Ambulatory Care Manager contacted the patient by telephone to perform follow-up assessment. Verified name and  with patient as identifiers. Patient has following risk factors of: HTN. Symptoms reviewed with patient who verbalized the following symptoms: fatigue, cough, nausea, dizziness/lightheadedness, no new symptoms, no worsening symptoms and headache, sore throat. Due to no new or worsening symptoms encounter was not routed to provider for escalation. Pt stated he is having some left flank pain over his kidney and seems to urinating less. Pt stated he has been drinking plenty of fluids and urine is clear. Pt denied and urinary frequency or burning. The RN, CINTIA instructed the pt to call his [facility] PCP for a follow up appointment. Educated patient about risk for severe COVID-19 due to risk factors according to CDC guidelines. ACM reviewed discharge instructions, medical action plan and red flag symptoms with the patient who verbalized understanding. Discussed COVID vaccination status: Yes. Education provided on COVID-19 vaccination as appropriate. Discussed exposure protocols and quarantine with CDC Guidelines. Patient was given an opportunity to verbalize any questions and concerns and agrees to contact ACM or health care provider for questions related to their healthcare. Was patient discharged with a pulse oximeter? Yes, pt has his own. SpO2 in mid 90\"s. Discussed and confirmed pulse oximeter discharge instructions and when to notify provider or seek emergency care. ACM provided contact information. No further follow-up call identified based on severity of symptoms and risk factors.

## 2022-02-06 NOTE — ED PROVIDER NOTES
I independently examined and evaluated Martine Garcia. I personally saw the patient and performed a substantive portion of the visit including all aspects of the medical decision making    In brief, Martine Garcia is a 42 y.o. male with a past medical history of seizure and migraines, who presents to the ED complaining of cough. Patient reports symptoms for 5 days. He reports cough intermittently productive of clear sputum. He also reports myalgias and fever. He has a sick contact. He has not been vaccinated for coronavirus or flu. He has some nausea without vomiting. He occasionally feels some very mild chest pain when he is coughing. He denies exertional, pleuritic, or positional pain. He denies shortness of breath. REVIEW OF SYSTEMS  All systems reviewed, pertinent positives per HPI otherwise noted to be negative. Focused exam revealed   PHYSICAL EXAM  BP (!) 141/100   Pulse 80   Temp 98.7 °F (37.1 °C)   Resp 18   SpO2 97%    GENERAL APPEARANCE: Awake and alert. Cooperative. no distress. HENT: Normocephalic. Atraumatic. Mucous membranes are moist  NECK: Supple. Full range of motion of the neck without stiffness or pain. EYES: PERRL. EOM's grossly intact. HEART/CHEST: RRR. No murmurs. Chest wall is not tender to palpation. LUNGS: Respirations unlabored. CTAB. Good air exchange. Speaking comfortably in full sentences. ABDOMEN: No tenderness. Soft. Non-distended. No masses. No organomegaly. No guarding or rebound. MUSCULOSKELETAL: No extremity edema. Compartments soft. No deformity. No tenderness in the extremities. All extremities neurovascularly intact. SKIN: Warm and dry. No acute rashes. NEUROLOGICAL: Alert and oriented. No gross facial drooping. Strength 5/5, sensation intact. PSYCHIATRIC: Normal mood and affect. ED course / MDM:   Overall well appearing patient, in no acute distress, presenting for cough, fever, and body aches. Physical exam unremarkable.  I personally saw the patient and performed a substantive portion of the visit including all aspects of the medical decision making. Differential diagnosis includes but is not limited to: COVID, flu, other viral illness, pneumonia, lower suspicion for pneumothorax, pleural effusion, pulmonary edema, ACS    Patient was initially seen by midlevel provider. Covid swab positive. Flu negative. Overall, patient symptoms are explained by coronavirus. We discussed any further testing, however patient declines at this time including chest x-ray and EKG. At this time I have low concern for pulmonary embolism. Patient has not had a previous blood clot. Patient denies other risk factors for pulmonary embolism. Patient does not have any evidence of DVT on exam.  Patient is low risk on PERC and Wells criteria. At this time, considering that risks associated with further work-up for pulmonary embolism outweigh the likelihood of this diagnosis. The patient was specifically advised their positive for COVID-19 infection, and they need to self-isolate at home and restrict any activities outside of their home except for seeking medical care, and to wear a facemask whenever around others. The patient was provided specific instructions related to COVID-19, along with recommendations for proper respiratory hygiene and instructions on prevention of transmission of infection to others. The patient was counseled to closely monitor their symptoms, and to return immediately to the Emergency Department for any difficulty breathing, confusion, dizziness or passing out, vomiting, chest pain, high fevers, weakness, or any other new or concerning symptoms. There is no evidence of emergent medical condition at this time, and the patient will be discharged in good condition. At least 3 minutes of smoking cessation education was provided to the patient. Evaluation and work-up overall reassuring.   At this time, feel the patient is appropriate for discharge to follow-up with a primary care doctor. Patient feels comfortable with discharge at this time. Patient was provided with prescriptions for vitamin C, zinc, Mucinex. Return precautions given. Encouraged PCP follow-up in 2 days. Patient discharged in stable condition. CLINICAL IMPRESSION  1. COVID    2. Elevated blood pressure reading        Blood pressure (!) 145/96, pulse 75, temperature 98.7 °F (37.1 °C), resp. rate 14, SpO2 96 %. Trudy Rizzo was discharged to home in stable condition. All diagnostic, treatment, and disposition decisions were made by myself in conjunction with the advanced practice provider. For all further details of the patient's emergency department visit, please see the advanced practice provider's documentation. Comment: Please note this report has been produced using speech recognition software and may contain errors related to that system including errors in grammar, punctuation, and spelling, as well as words and phrases that may be inappropriate. If there are any questions or concerns please feel free to contact the dictating provider for clarification.         Jeanne Mcdonald MD  02/06/22 0439

## 2022-03-22 ENCOUNTER — APPOINTMENT (OUTPATIENT)
Dept: CT IMAGING | Age: 42
End: 2022-03-22
Payer: MEDICAID

## 2022-03-22 ENCOUNTER — HOSPITAL ENCOUNTER (EMERGENCY)
Age: 42
Discharge: HOME | End: 2022-03-22
Attending: EMERGENCY MEDICINE
Payer: MEDICAID

## 2022-03-22 VITALS
OXYGEN SATURATION: 98 % | WEIGHT: 220 LBS | DIASTOLIC BLOOD PRESSURE: 89 MMHG | SYSTOLIC BLOOD PRESSURE: 135 MMHG | HEIGHT: 64 IN | TEMPERATURE: 98.9 F | BODY MASS INDEX: 37.56 KG/M2 | RESPIRATION RATE: 13 BRPM | HEART RATE: 53 BPM

## 2022-03-22 DIAGNOSIS — I95.1 ORTHOSTASIS: Primary | ICD-10-CM

## 2022-03-22 DIAGNOSIS — H81.391 PERIPHERAL VERTIGO INVOLVING RIGHT EAR: ICD-10-CM

## 2022-03-22 LAB
A/G RATIO: 3.6 (ref 1.1–2.2)
ALBUMIN SERPL-MCNC: 5 G/DL (ref 3.4–5)
ALP BLD-CCNC: 73 U/L (ref 40–129)
ALT SERPL-CCNC: 25 U/L (ref 10–40)
ANION GAP SERPL CALCULATED.3IONS-SCNC: 12 MMOL/L (ref 3–16)
AST SERPL-CCNC: 17 U/L (ref 15–37)
BASOPHILS ABSOLUTE: 0 K/UL (ref 0–0.2)
BASOPHILS RELATIVE PERCENT: 0.9 %
BILIRUB SERPL-MCNC: 1.8 MG/DL (ref 0–1)
BILIRUBIN URINE: NEGATIVE
BLOOD, URINE: NEGATIVE
BUN BLDV-MCNC: 13 MG/DL (ref 7–20)
CALCIUM SERPL-MCNC: 9.5 MG/DL (ref 8.3–10.6)
CHLORIDE BLD-SCNC: 106 MMOL/L (ref 99–110)
CLARITY: CLEAR
CO2: 23 MMOL/L (ref 21–32)
COLOR: YELLOW
CREAT SERPL-MCNC: 0.8 MG/DL (ref 0.9–1.3)
EKG ATRIAL RATE: 58 BPM
EKG DIAGNOSIS: NORMAL
EKG P AXIS: 18 DEGREES
EKG P-R INTERVAL: 158 MS
EKG Q-T INTERVAL: 434 MS
EKG QRS DURATION: 96 MS
EKG QTC CALCULATION (BAZETT): 426 MS
EKG R AXIS: -1 DEGREES
EKG T AXIS: 8 DEGREES
EKG VENTRICULAR RATE: 58 BPM
EOSINOPHILS ABSOLUTE: 0.3 K/UL (ref 0–0.6)
EOSINOPHILS RELATIVE PERCENT: 5.2 %
GFR AFRICAN AMERICAN: >60
GFR NON-AFRICAN AMERICAN: >60
GLUCOSE BLD-MCNC: 114 MG/DL (ref 70–99)
GLUCOSE URINE: NEGATIVE MG/DL
HCT VFR BLD CALC: 40 % (ref 40.5–52.5)
HEMOGLOBIN: 14 G/DL (ref 13.5–17.5)
KETONES, URINE: NEGATIVE MG/DL
LEUKOCYTE ESTERASE, URINE: NEGATIVE
LYMPHOCYTES ABSOLUTE: 1.4 K/UL (ref 1–5.1)
LYMPHOCYTES RELATIVE PERCENT: 26.2 %
MCH RBC QN AUTO: 29 PG (ref 26–34)
MCHC RBC AUTO-ENTMCNC: 34.9 G/DL (ref 31–36)
MCV RBC AUTO: 83 FL (ref 80–100)
MICROSCOPIC EXAMINATION: NORMAL
MONOCYTES ABSOLUTE: 0.4 K/UL (ref 0–1.3)
MONOCYTES RELATIVE PERCENT: 6.8 %
NEUTROPHILS ABSOLUTE: 3.4 K/UL (ref 1.7–7.7)
NEUTROPHILS RELATIVE PERCENT: 60.9 %
NITRITE, URINE: NEGATIVE
PDW BLD-RTO: 13.5 % (ref 12.4–15.4)
PH UA: 5 (ref 5–8)
PLATELET # BLD: 318 K/UL (ref 135–450)
PMV BLD AUTO: 7.9 FL (ref 5–10.5)
POTASSIUM SERPL-SCNC: 4.2 MMOL/L (ref 3.5–5.1)
PROTEIN UA: NEGATIVE MG/DL
RBC # BLD: 4.82 M/UL (ref 4.2–5.9)
SODIUM BLD-SCNC: 141 MMOL/L (ref 136–145)
SPECIFIC GRAVITY UA: >=1.03 (ref 1–1.03)
TOTAL PROTEIN: 6.4 G/DL (ref 6.4–8.2)
URINE TYPE: NORMAL
UROBILINOGEN, URINE: 0.2 E.U./DL
WBC # BLD: 5.5 K/UL (ref 4–11)

## 2022-03-22 PROCEDURE — 80053 COMPREHEN METABOLIC PANEL: CPT

## 2022-03-22 PROCEDURE — 85025 COMPLETE CBC W/AUTO DIFF WBC: CPT

## 2022-03-22 PROCEDURE — 2580000003 HC RX 258: Performed by: EMERGENCY MEDICINE

## 2022-03-22 PROCEDURE — 6370000000 HC RX 637 (ALT 250 FOR IP): Performed by: EMERGENCY MEDICINE

## 2022-03-22 PROCEDURE — 81003 URINALYSIS AUTO W/O SCOPE: CPT

## 2022-03-22 PROCEDURE — 70498 CT ANGIOGRAPHY NECK: CPT

## 2022-03-22 PROCEDURE — 70450 CT HEAD/BRAIN W/O DYE: CPT

## 2022-03-22 PROCEDURE — 93010 ELECTROCARDIOGRAM REPORT: CPT | Performed by: INTERNAL MEDICINE

## 2022-03-22 PROCEDURE — 6360000004 HC RX CONTRAST MEDICATION: Performed by: EMERGENCY MEDICINE

## 2022-03-22 PROCEDURE — 99284 EMERGENCY DEPT VISIT MOD MDM: CPT

## 2022-03-22 PROCEDURE — 93005 ELECTROCARDIOGRAM TRACING: CPT | Performed by: EMERGENCY MEDICINE

## 2022-03-22 RX ORDER — MECLIZINE HYDROCHLORIDE 25 MG/1
25 TABLET ORAL 3 TIMES DAILY PRN
Qty: 30 TABLET | Refills: 0 | Status: SHIPPED | OUTPATIENT
Start: 2022-03-22 | End: 2022-04-01

## 2022-03-22 RX ORDER — 0.9 % SODIUM CHLORIDE 0.9 %
1000 INTRAVENOUS SOLUTION INTRAVENOUS ONCE
Status: COMPLETED | OUTPATIENT
Start: 2022-03-22 | End: 2022-03-22

## 2022-03-22 RX ORDER — MECLIZINE HCL 12.5 MG/1
25 TABLET ORAL ONCE
Status: COMPLETED | OUTPATIENT
Start: 2022-03-22 | End: 2022-03-22

## 2022-03-22 RX ADMIN — IOPAMIDOL 75 ML: 755 INJECTION, SOLUTION INTRAVENOUS at 07:22

## 2022-03-22 RX ADMIN — MECLIZINE 25 MG: 12.5 TABLET ORAL at 06:31

## 2022-03-22 RX ADMIN — SODIUM CHLORIDE 1000 ML: 9 INJECTION, SOLUTION INTRAVENOUS at 06:52

## 2022-03-22 ASSESSMENT — PAIN SCALES - GENERAL: PAINLEVEL_OUTOF10: 6

## 2022-03-22 ASSESSMENT — PAIN DESCRIPTION - PAIN TYPE: TYPE: ACUTE PAIN

## 2022-03-22 ASSESSMENT — PAIN DESCRIPTION - FREQUENCY: FREQUENCY: CONTINUOUS

## 2022-03-22 ASSESSMENT — PAIN DESCRIPTION - LOCATION: LOCATION: ABDOMEN;CHEST

## 2022-03-22 ASSESSMENT — PAIN - FUNCTIONAL ASSESSMENT: PAIN_FUNCTIONAL_ASSESSMENT: 0-10

## 2022-03-22 NOTE — ED PROVIDER NOTES
Emergency Physician Note        Note Open Time: 8:21 AM EDT    Chief Complaint  Dizziness (patient woke up this AM with room spinning around 2am. Patient went back to sleep, woke up later in the AM with room still spinning and \"a sensation over my body\". +nausea. Hx of Vertigo)       History of Present Illness  Kim Lawton is a 42 y.o. male who presents to the ED for dizziness. Patient reports he woke up this morning with dizziness that was present even while lying in bed. He then got up to use the restroom and had to hold onto the wall to make it to the restroom. He states he felt the whole room was spinning and there was a warm sensation over his entire body. He had nausea but no vomiting. He states he had vertigo in the past a few years ago. He denies any headache at this time. No chest pain, shortness of breath, palpitations, diaphoresis. Patient does have hypertension and hyperlipidemia both of which are not currently treated and does smoke occasionally. No history of any coronary disease or any strokes or TIAs. 10 systems reviewed, pertinent positives per HPI otherwise noted to be negative    I have reviewed the following from the nursing documentation:      Prior to Admission medications    Medication Sig Start Date End Date Taking? Authorizing Provider   ascorbic acid (VITAMIN C) 500 MG tablet Take 1 tablet by mouth 2 times daily for 7 days 1/27/22 2/3/22  JOSE Foster   zinc sulfate (ZINCATE) 220 (50 Zn) MG capsule Take 1 capsule by mouth daily for 7 days 1/27/22 2/3/22  JOSE Foster   dicyclomine (BENTYL) 10 MG capsule Take 10 mg by mouth 3 times daily as needed 8/27/21   Historical Provider, MD   gabapentin (NEURONTIN) 300 MG capsule Take 300 mg by mouth 3 times daily as needed.  12/3/21   Historical Provider, MD   losartan-hydroCHLOROthiazide (HYZAAR) 100-12.5 MG per tablet Take 1 tablet by mouth daily 9/8/21   Historical Provider, MD   meloxicam (MOBIC) 15 MG tablet Take 15 mg by mouth daily 8/27/21   Historical Provider, MD   hydroCHLOROthiazide (MICROZIDE) 12.5 MG capsule Take 12.5 mg by mouth daily    Historical Provider, MD   HYDROcodone-acetaminophen ([place]) 5-325 MG per tablet  11/5/21   Historical Provider, MD   albuterol sulfate HFA (PROVENTIL HFA) 108 (90 Base) MCG/ACT inhaler Inhale 2 puffs into the lungs every 4 hours as needed for Wheezing or Shortness of Breath (Space out to every 6 hours as symptoms improve) Space out to every 6 hours as symptoms improve. 11/9/21   FLORES Riojas - CNP   ibuprofen (IBU) 800 MG tablet Take 1 tablet by mouth every 8 hours as needed for Pain. 2/18/14   John Bailey PA-C   naproxen (NAPROSYN) 500 MG tablet Take 1 tablet by mouth 2 times daily for 10 days. 9/4/13 9/14/13  Laurie Berrios MD   Lansoprazole (PREVACID PO) Take  by mouth. Historical Provider, MD   DiphenhydrAMINE HCl (BENADRYL ALLERGY PO) Take  by mouth. 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     Social History     Socioeconomic History    Marital status:      Spouse name: Not on file    Number of children: Not on file    Years of education: Not on file    Highest education level: Not on file   Occupational History    Not on file   Tobacco Use    Smoking status: Current Some Day Smoker     Types: Pipe, Cigars    Smokeless tobacco: Never Used   Substance and Sexual Activity    Alcohol use: Yes     Alcohol/week: 0.0 standard drinks     Comment: occasional    Drug use: No    Sexual activity: Not on file   Other Topics Concern    Not on file   Social History Narrative    Not on file     Social Determinants of Health     Financial Resource Strain:     Difficulty of Paying Living Expenses: Not on file   Food Insecurity:     Worried About Running Out of Food in the Last Year: Not on file    Anastasiia of Food in the Last Year: Not on file   Transportation Needs:     Lack of Transportation (Medical): Not on file    Lack of Transportation (Non-Medical): Not on file   Physical Activity:     Days of Exercise per Week: Not on file    Minutes of Exercise per Session: Not on file   Stress:     Feeling of Stress : Not on file   Social Connections:     Frequency of Communication with Friends and Family: Not on file    Frequency of Social Gatherings with Friends and Family: Not on file    Attends Cheondoism Services: Not on file    Active Member of [address] [place] [address] or Organizations: Not on file    Attends Club or Organization Meetings: Not on file    Marital Status: Not on file   Intimate Partner Violence:     Fear of Current or Ex-Partner: Not on file    Emotionally Abused: Not on file    Physically Abused: Not on file    Sexually Abused: Not on file   Housing Stability:     Unable to Pay for Housing in the Last Year: Not on file    Number of [place] in the Last Year: Not on file    Unstable Housing in the Last Year: Not on file       Nursing notes reviewed. ED Triage Vitals [03/22/22 0607]   Enc Vitals Group      BP (!) 155/97      Pulse 73      Resp 16      Temp 98.9 °F (37.2 °C)      Temp Source Oral      SpO2 96 %      Weight 220 lb (99.8 kg)      Height 5' 4\" (1.626 m)      Head Circumference       Peak Flow       Pain Score       Pain Loc       Pain Edu? Excl. in [address]? GENERAL:  Awake, alert. Well developed, well nourished with no apparent distress.    HENT: Normocephalic, Atraumatic, moist mucous membranes. [place]-Hallpike maneuvers are strongly positive to the right but weakly positive also to the left. EYES:  Pupils equal round and reactive to light, Conjunctiva normal, extraocular movements normal.  NECK:  No meningeal signs, Supple. CHEST:  Regular rate and rhythm, chest wall non-tender. LUNGS:  Clear to auscultation bilaterally. ABDOMEN:  Soft, non-tender, no rebound, rigidity or guarding, non-distended, normal bowel sounds. No costovertebral angle tenderness to palpation. BACK:  No tenderness. EXTREMITIES:  Normal range of motion, no edema, no bony tenderness, no deformity, distal pulses present. SKIN: Warm, dry and intact. NEUROLOGIC: Normal mental status. Moving all extremities to command. Normal hints exam.  Radha Hinds's NIH Stroke Scale is:  Level of Consciousness:  0 - alert; keenly responsive  LOC Questions:  0 - answers both questions correctly  LOC Commands:  0 - performs both tasks correctly  Best Gaze:  0 - normal  Visual Fields:  0 - no visual loss  Facial Palsy:  0 - normal symmetric movement  Motor-Arm-Left:  0 - no drift, limb holds 90 (or 45) degrees for full 10 seconds  Motor-Leg-Left:  0 - no drift; leg holds 30 degree position for full 5 seconds  Motor-Arm-Right:  0 - no drift, limb holds 90 (or 45) degrees for full 10 seconds  Motor-Leg-Right:  0 - no drift; leg holds 30 degree position for full 5 seconds  Limb Ataxia:  0 - absent  Sensory:  0 - normal; no sensory loss  Best Language:  0 - no aphasia, normal  Dysarthria:  0 - normal  Extinction and Inattention:  0 - no abnormality          LABS and DIAGNOSTIC RESULTS  EKG  The Ekg interpreted by me shows  sinus bradycardia, rate=58   Axis is   Normal  QTc is  normal  Intervals and Durations are unremarkable. ST Segments: normal  Delta waves, Brugada Syndrome, and Short [place] are not present.   No significant change from prior EKG dated 14 nov 2021    RADIOLOGY  X-RAYS:  I have reviewed radiologic plain film image(s). ALL OTHER NON-PLAIN FILM IMAGES SUCH AS CT, ULTRASOUND AND MRI HAVE BEEN READ BY THE RADIOLOGIST. CT Head WO Contrast   Final Result   No acute intracranial abnormality. Trace paranasal sinus disease         CTA HEAD NECK W CONTRAST   Final Result   Unremarkable CTA of the head and neck. RECOMMENDATIONS:   Subcentimeter incidental right thyroid nodule. No follow-up imaging is   recommended. Reference: J Am Alexandre Radiol. 2015 Feb;12(2): 143-50              LABS  Labs Reviewed   CBC WITH AUTO DIFFERENTIAL - Abnormal; Notable for the following components:       Result Value    Hematocrit 40.0 (*)     All other components within normal limits   COMPREHENSIVE METABOLIC PANEL - Abnormal; Notable for the following components:    Glucose 114 (*)     CREATININE 0.8 (*)     Albumin/Globulin Ratio 3.6 (*)     Total Bilirubin 1.8 (*)     All other components within normal limits   URINALYSIS       MEDICAL DECISION MAKING        Patient's orthostatic vital signs demonstrate significant orthostatic change in my opinion and for this reason we will give him IV fluid bolus. After receiving fluids and meclizine patient feels much better  The total Critical Care time is 30minutes which excludes separately billable procedures. The critical care was concerning IV fluid bolus for dehydration. This time is exclusive of any time documented by any other providers. I advised the patient to return to the emergency department immediately for any new or worsening symptoms, such as fever, headache, vomiting or any new symptoms. The patient voiced agreement and understanding of the treatment plan.       I estimate there is LOW risk for ACUTE CORONARY SYNDROME, INTRACRANIAL HEMORRHAGE, MALIGNANT DYSRHYTHMIA, MENINGITIS, PNEUMONIA, PULMONARY EMBOLISM, SEPSIS, SUBARACHNOID HEMORRHAGE, SUBDURAL HEMATOMA, STROKE, or URINARY TRACT INFECTION, thus I consider the discharge disposition reasonable. Mary Hernandez and I have discussed the diagnosis and risks, and we agree with discharging home to follow-up with their primary doctor. We also discussed returning to the Emergency Department immediately if new or worsening symptoms occur. We have discussed the symptoms which are most concerning (e.g., changing or worsening pain, weakness, vomiting, fever) that necessitate immediate return. Final Impression    1. Orthostasis    2. Peripheral vertigo involving right ear        Blood pressure 135/89, pulse 53, temperature 98.9 °F (37.2 °C), temperature source Oral, resp. rate 13, height 5' 4\" (1.626 m), weight 220 lb (99.8 kg), SpO2 98 %. Patient was given scripts for the following medications. I counseled patient how to take these medications. New Prescriptions    MECLIZINE (ANTIVERT) 25 MG TABLET    Take 1 tablet by mouth 3 times daily as needed for Dizziness       Disposition  Pt is in good condition upon Discharge to home. This chart was generated using the [address] [place] [address] [address] dictation system. I created this record but it may contain dictation errors.           Nito Marie MD  03/22/22 5134

## 2022-03-31 ENCOUNTER — HOSPITAL ENCOUNTER (EMERGENCY)
Age: 42
Discharge: HOME | End: 2022-03-31
Payer: MEDICAID

## 2022-03-31 VITALS
HEART RATE: 83 BPM | BODY MASS INDEX: 39.87 KG/M2 | HEIGHT: 63 IN | SYSTOLIC BLOOD PRESSURE: 145 MMHG | TEMPERATURE: 98 F | WEIGHT: 225 LBS | OXYGEN SATURATION: 98 % | DIASTOLIC BLOOD PRESSURE: 100 MMHG | RESPIRATION RATE: 18 BRPM

## 2022-03-31 DIAGNOSIS — R21 RASH OF BOTH HANDS: Primary | ICD-10-CM

## 2022-03-31 DIAGNOSIS — R03.0 ELEVATED BLOOD PRESSURE READING: ICD-10-CM

## 2022-03-31 PROCEDURE — 6370000000 HC RX 637 (ALT 250 FOR IP): Performed by: PHYSICIAN ASSISTANT

## 2022-03-31 PROCEDURE — 99284 EMERGENCY DEPT VISIT MOD MDM: CPT

## 2022-03-31 RX ORDER — HYDROXYZINE HYDROCHLORIDE 25 MG/1
25 TABLET, FILM COATED ORAL ONCE
Status: COMPLETED | OUTPATIENT
Start: 2022-03-31 | End: 2022-03-31

## 2022-03-31 RX ORDER — HYDROXYZINE HYDROCHLORIDE 25 MG/1
25 TABLET, FILM COATED ORAL EVERY 8 HOURS PRN
Qty: 20 TABLET | Refills: 0 | Status: SHIPPED | OUTPATIENT
Start: 2022-03-31 | End: 2022-04-10

## 2022-03-31 RX ORDER — TRIAMCINOLONE ACETONIDE 1 MG/G
CREAM TOPICAL 2 TIMES DAILY
Qty: 60 G | Refills: 0 | Status: SHIPPED | OUTPATIENT
Start: 2022-03-31

## 2022-03-31 RX ADMIN — HYDROXYZINE HYDROCHLORIDE 25 MG: 25 TABLET ORAL at 03:24

## 2022-03-31 ASSESSMENT — ENCOUNTER SYMPTOMS
THROAT SWELLING: 0
TROUBLE SWALLOWING: 0
SHORTNESS OF BREATH: 0
VOMITING: 0
SORE THROAT: 0

## 2022-03-31 NOTE — ED TRIAGE NOTES
Patient reports 4 days ago started with red/ itchy spot on left wrist.  Since then now both hands are red and itchy,  Has itchy feelings going up his arms and torso. Denies known cause of itchiness, only allergy that \"acts like this\" is aloe. Works as a , denies new smells, lotions, sprays etc at the house.

## 2022-03-31 NOTE — Clinical Note
Norma Pike was seen and treated in our emergency department on 3/31/2022. He may return to work on 04/02/2022. If you have any questions or concerns, please don't hesitate to call.       Choco Bobby PA-C

## 2022-03-31 NOTE — ED PROVIDER NOTES
[address] ED  EMERGENCY DEPARTMENT ENCOUNTER        Pt Name: Marcelino Brenner  MRN: [id number]  [place] 1980  Date of evaluation: 3/31/2022  Provider: Shane Jackson PA-C  PCP: Angela Lewis, DO    Shared Visit or Autonomous Visit: HENRY. I have evaluated this patient. My supervising physician was available for consultation. CHIEF COMPLAINT       Chief Complaint   Patient presents with    Rash       HISTORY OF PRESENT ILLNESS   (Location/Symptom, Timing/Onset, Context/Setting, Quality, Duration, Modifying Factors, Severity)  Note limiting factors. Marcelino Brenner is a 42 y.o. male presenting to the emergency department for evaluation of itchy red rash to bilateral dorsal hands for the past 4 days. No known exposures. States the rash is spreading on his wrists. No trouble breathing or any difficulty swallowing. The history is provided by the patient. Rash  Location: bilateral hands. Quality: dryness, itchiness and redness    Quality: not blistering and not draining    Duration:  4 days  Chronicity:  New  Context: not exposure to similar rash, not medications, not new detergent/soap and not sick contacts    Associated symptoms: no fever, no shortness of breath, no sore throat, no throat swelling and not vomiting          Nursing Notes were reviewed    REVIEW OF SYSTEMS    (2-9 systems for level 4, 10 or more for level 5)     Review of Systems   Constitutional: Negative for fever. HENT: Negative for sore throat and trouble swallowing. Respiratory: Negative for shortness of breath. Cardiovascular: Negative for chest pain. Gastrointestinal: Negative for vomiting. Skin: Positive for rash. Positives and Pertinent negatives as per HPI.        PAST MEDICAL HISTORY     Past Medical History:   Diagnosis Date    Migraine     Pneumonia     Seizures ([address]     temporal seizures as a kid         SURGICAL HISTORY     Past Surgical History:   Procedure Laterality Date    FOOT SURGERY      HAND SURGERY      HERNIA REPAIR      KNEE ARTHROSCOPY           [address]       Discharge Medication List as of 3/31/2022  3:23 AM      CONTINUE these medications which have NOT CHANGED    Details   meclizine (ANTIVERT) 25 MG tablet Take 1 tablet by mouth 3 times daily as needed for Dizziness, Disp-30 tablet, R-0Normal      ascorbic acid (VITAMIN C) 500 MG tablet Take 1 tablet by mouth 2 times daily for 7 days, Disp-14 tablet, R-0Normal      zinc sulfate (ZINCATE) 220 (50 Zn) MG capsule Take 1 capsule by mouth daily for 7 days, Disp-7 capsule, R-0Normal      dicyclomine (BENTYL) 10 MG capsule Take 10 mg by mouth 3 times daily as neededHistorical Med      gabapentin (NEURONTIN) 300 MG capsule Take 300 mg by mouth 3 times daily as needed. Historical Med      losartan-hydroCHLOROthiazide (HYZAAR) 100-12.5 MG per tablet Take 1 tablet by mouth dailyHistorical Med      meloxicam (MOBIC) 15 MG tablet Take 15 mg by mouth dailyHistorical Med      hydroCHLOROthiazide (MICROZIDE) 12.5 MG capsule Take 12.5 mg by mouth dailyHistorical Med      HYDROcodone-acetaminophen (NORCO) 5-325 MG per tablet Historical Med      albuterol sulfate HFA (PROVENTIL HFA) 108 (90 Base) MCG/ACT inhaler Inhale 2 puffs into the lungs every 4 hours as needed for Wheezing or Shortness of Breath (Space out to every 6 hours as symptoms improve) Space out to every 6 hours as symptoms improve., Disp-1 each, R-0Print      ibuprofen (IBU) 800 MG tablet Take 1 tablet by mouth every 8 hours as needed for Pain., Disp-20 tablet, R-0      naproxen (NAPROSYN) 500 MG tablet Take 1 tablet by mouth 2 times daily for 10 days. , Disp-20 tablet, R-0      Lansoprazole (PREVACID PO) Take  by mouth. ALLERGIES     Azithromycin, Ambien [zolpidem tartrate], Codeine, and Procaine hcl    FAMILYHISTORY     History reviewed. No pertinent family history.        SOCIAL HISTORY       Social History     Socioeconomic History    Marital status:      Spouse name: None    Number of children: None    Years of education: None    Highest education level: None   Occupational History    None   Tobacco Use    Smoking status: Current Some Day Smoker     Types: Pipe, Cigars    Smokeless tobacco: Never Used   Substance and Sexual Activity    Alcohol use: Yes     Alcohol/week: 0.0 standard drinks     Comment: occasional    Drug use: No    Sexual activity: None   Other Topics Concern    None   Social History Narrative    None     Social Determinants of Health     Financial Resource Strain:     Difficulty of Paying Living Expenses: Not on file   Food Insecurity:     Worried About Running Out of Food in the Last Year: Not on file    Anastasiia of Food in the Last Year: Not on file   Transportation Needs:     Lack of Transportation (Medical): Not on file    Lack of Transportation (Non-Medical):  Not on file   Physical Activity:     Days of Exercise per Week: Not on file    Minutes of Exercise per Session: Not on file   Stress:     Feeling of Stress : Not on file   Social Connections:     Frequency of Communication with Friends and Family: Not on file    Frequency of Social Gatherings with Friends and Family: Not on file    Attends Sabianist Services: Not on file    Active Member of [address] [place] [address] [employer] or Organizations: Not on file    Attends Club or Organization Meetings: Not on file    Marital Status: Not on file   Intimate Partner Violence:     Fear of Current or Ex-Partner: Not on file    Emotionally Abused: Not on file    Physically Abused: Not on file    Sexually Abused: Not on file   Housing Stability:     Unable to Pay for Housing in the Last Year: Not on file    Number of [place] in the Last Year: Not on file    Unstable Housing in the Last Year: Not on file       SCREENINGS    John Coma Scale  Eye Opening: Spontaneous  Best Verbal Response: Oriented  Best Motor Response: Obeys commands  Freedom Coma Scale Score: 15        PHYSICAL EXAM    (up to 7 for level 4, 8 or more for level 5)     ED Triage Vitals [03/31/22 0051]   BP Temp Temp Source Pulse Resp SpO2 Height Weight   (!) 145/100 98 °F (36.7 °C) Oral 83 18 98 % 5' 3\" (1.6 m) 225 lb (102.1 kg)       Physical Exam  Vitals and nursing note reviewed. Constitutional:       Appearance: He is well-developed. He is not ill-appearing or toxic-appearing. HENT:      Head: Normocephalic and atraumatic. Mouth/Throat:      Mouth: Mucous membranes are moist.      Pharynx: Oropharynx is clear. No oropharyngeal exudate or posterior oropharyngeal erythema. Cardiovascular:      Rate and Rhythm: Normal rate and regular rhythm. Pulses: Normal pulses. Radial pulses are 2+ on the right side and 2+ on the left side. Heart sounds: Normal heart sounds. Pulmonary:      Effort: Pulmonary effort is normal. No respiratory distress. Breath sounds: Normal breath sounds. No stridor. No wheezing, rhonchi or rales. Skin:     General: Skin is warm and dry. Capillary Refill: Capillary refill takes less than 2 seconds. Findings: Rash present. No petechiae. Rash is not purpuric, pustular, urticarial or vesicular. Comments: Erythematous dry itchy rash to dorsal aspects of both hands and small area of rash at the wrists. No drainage. No blisters. Neurological:      Mental Status: He is alert and oriented to person, place, and time. GCS: GCS eye subscore is 4. GCS verbal subscore is 5. GCS motor subscore is 6. Sensory: Sensation is intact. Motor: Motor function is intact. No abnormal muscle tone. Psychiatric:         Behavior: Behavior normal.         DIAGNOSTIC RESULTS   LABS:    Labs Reviewed - No data to display    All other labs were within normal range or not returned as of this dictation. EKG:  All EKG's are interpreted by the Emergency Department Physician in the absence of a cardiologist.  Please see their note for interpretation of EKG. RADIOLOGY:   Non-plain film images such as CT, Ultrasound and MRI are read by the radiologist. Plain radiographic images are visualized andpreliminarily interpreted by the  ED Provider with the below findings:        Interpretation perthe Radiologist below, if available at the time of this note:    No orders to display     No results found. PROCEDURES   Unless otherwise noted below, none     Procedures    CRITICAL CARE TIME   N/A    CONSULTS:  None      EMERGENCY DEPARTMENT COURSE and DIFFERENTIAL DIAGNOSIS/MDM:   Vitals:    Vitals:    03/31/22 0051   BP: (!) 145/100   Pulse: 83   Resp: 18   Temp: 98 °F (36.7 °C)   TempSrc: Oral   SpO2: 98%   Weight: 225 lb (102.1 kg)   Height: 5' 3\" (1.6 m)       Patient was given thefollowing medications:  Medications   hydrOXYzine (ATARAX) tablet 25 mg (25 mg Oral Given 3/31/22 0324)           FINAL IMPRESSION      1. Rash of both hands    2.  Elevated blood pressure reading          DISPOSITION/PLAN   DISPOSITION Decision to Discharge    PATIENT REFERREDTO:  Roselia Gregory, DO  [address]  [phone]    In 1 week      [facility] ED  [address] [place]  [phone]    If symptoms worsen      DISCHARGE MEDICATIONS:  Discharge Medication List as of 3/31/2022  3:23 AM      START taking these medications    Details   triamcinolone (KENALOG) 0.1 % cream Apply topically 2 times daily, Topical, 2 TIMES DAILY Starting Thu 3/31/2022, Disp-60 g, R-0, Normal      hydrOXYzine (ATARAX) 25 MG tablet Take 1 tablet by mouth every 8 hours as needed for Itching, Disp-20 tablet, R-0Normal             DISCONTINUED MEDICATIONS:  Discharge Medication List as of 3/31/2022  3:23 AM      STOP taking these medications       DiphenhydrAMINE HCl (BENADRYL ALLERGY PO) Comments:   Reason for Stopping:                      (Please note that portions ofthis note were completed with a voice recognition program. Efforts were made to edit the dictations but occasionally words are mis-transcribed.)    Jose North PA-C (electronically signed)           Cristela Singh PA-C  03/31/22 0843

## 2022-05-02 ENCOUNTER — APPOINTMENT (OUTPATIENT)
Dept: CT IMAGING | Age: 42
DRG: 192 | End: 2022-05-02
Payer: MEDICAID

## 2022-05-02 ENCOUNTER — HOSPITAL ENCOUNTER (INPATIENT)
Age: 42
LOS: 3 days | Discharge: HOME | DRG: 192 | End: 2022-05-05
Attending: EMERGENCY MEDICINE | Admitting: INTERNAL MEDICINE
Payer: MEDICAID

## 2022-05-02 ENCOUNTER — APPOINTMENT (OUTPATIENT)
Dept: GENERAL RADIOLOGY | Age: 42
DRG: 192 | End: 2022-05-02
Payer: MEDICAID

## 2022-05-02 DIAGNOSIS — R07.9 CHEST PAIN, UNSPECIFIED TYPE: Primary | ICD-10-CM

## 2022-05-02 DIAGNOSIS — R29.898 LEFT HAND WEAKNESS: ICD-10-CM

## 2022-05-02 DIAGNOSIS — R29.898 WEAKNESS OF LEFT LOWER EXTREMITY: ICD-10-CM

## 2022-05-02 DIAGNOSIS — I10 UNCONTROLLED HYPERTENSION: ICD-10-CM

## 2022-05-02 PROBLEM — E78.5 HYPERLIPIDEMIA: Status: ACTIVE | Noted: 2022-05-02

## 2022-05-02 PROBLEM — Z72.0 TOBACCO ABUSE: Status: ACTIVE | Noted: 2022-05-02

## 2022-05-02 PROBLEM — E66.9 OBESITY: Status: ACTIVE | Noted: 2022-05-02

## 2022-05-02 LAB
A/G RATIO: 2 (ref 1.1–2.2)
ALBUMIN SERPL-MCNC: 5.2 G/DL (ref 3.4–5)
ALP BLD-CCNC: 86 U/L (ref 40–129)
ALT SERPL-CCNC: 33 U/L (ref 10–40)
ANION GAP SERPL CALCULATED.3IONS-SCNC: 12 MMOL/L (ref 3–16)
AST SERPL-CCNC: 20 U/L (ref 15–37)
BASOPHILS ABSOLUTE: 0 K/UL (ref 0–0.2)
BASOPHILS RELATIVE PERCENT: 0.8 %
BILIRUB SERPL-MCNC: 3.4 MG/DL (ref 0–1)
BUN BLDV-MCNC: 19 MG/DL (ref 7–20)
CALCIUM SERPL-MCNC: 10.8 MG/DL (ref 8.3–10.6)
CHLORIDE BLD-SCNC: 102 MMOL/L (ref 99–110)
CO2: 26 MMOL/L (ref 21–32)
CREAT SERPL-MCNC: 0.8 MG/DL (ref 0.9–1.3)
EKG ATRIAL RATE: 70 BPM
EKG DIAGNOSIS: NORMAL
EKG P AXIS: 17 DEGREES
EKG P-R INTERVAL: 152 MS
EKG Q-T INTERVAL: 386 MS
EKG QRS DURATION: 92 MS
EKG QTC CALCULATION (BAZETT): 416 MS
EKG R AXIS: -2 DEGREES
EKG T AXIS: 14 DEGREES
EKG VENTRICULAR RATE: 70 BPM
EOSINOPHILS ABSOLUTE: 0.2 K/UL (ref 0–0.6)
EOSINOPHILS RELATIVE PERCENT: 3.2 %
GFR AFRICAN AMERICAN: >60
GFR NON-AFRICAN AMERICAN: >60
GLUCOSE BLD-MCNC: 116 MG/DL (ref 70–99)
HCT VFR BLD CALC: 43.2 % (ref 40.5–52.5)
HEMOGLOBIN: 14.9 G/DL (ref 13.5–17.5)
INR BLD: 1.02 (ref 0.88–1.12)
LYMPHOCYTES ABSOLUTE: 1.3 K/UL (ref 1–5.1)
LYMPHOCYTES RELATIVE PERCENT: 22.6 %
MCH RBC QN AUTO: 29.1 PG (ref 26–34)
MCHC RBC AUTO-ENTMCNC: 34.4 G/DL (ref 31–36)
MCV RBC AUTO: 84.8 FL (ref 80–100)
MONOCYTES ABSOLUTE: 0.4 K/UL (ref 0–1.3)
MONOCYTES RELATIVE PERCENT: 6.6 %
NEUTROPHILS ABSOLUTE: 3.9 K/UL (ref 1.7–7.7)
NEUTROPHILS RELATIVE PERCENT: 66.8 %
PDW BLD-RTO: 13.5 % (ref 12.4–15.4)
PLATELET # BLD: 343 K/UL (ref 135–450)
PMV BLD AUTO: 7.8 FL (ref 5–10.5)
POTASSIUM REFLEX MAGNESIUM: 3.9 MMOL/L (ref 3.5–5.1)
PROTHROMBIN TIME: 11.5 SEC (ref 9.9–12.7)
RBC # BLD: 5.1 M/UL (ref 4.2–5.9)
SODIUM BLD-SCNC: 140 MMOL/L (ref 136–145)
TOTAL PROTEIN: 7.8 G/DL (ref 6.4–8.2)
TROPONIN: <0.01 NG/ML
WBC # BLD: 5.8 K/UL (ref 4–11)

## 2022-05-02 PROCEDURE — 6370000000 HC RX 637 (ALT 250 FOR IP): Performed by: INTERNAL MEDICINE

## 2022-05-02 PROCEDURE — 36415 COLL VENOUS BLD VENIPUNCTURE: CPT

## 2022-05-02 PROCEDURE — 1200000000 HC SEMI PRIVATE

## 2022-05-02 PROCEDURE — 80053 COMPREHEN METABOLIC PANEL: CPT

## 2022-05-02 PROCEDURE — 85025 COMPLETE CBC W/AUTO DIFF WBC: CPT

## 2022-05-02 PROCEDURE — 6360000002 HC RX W HCPCS: Performed by: INTERNAL MEDICINE

## 2022-05-02 PROCEDURE — 93005 ELECTROCARDIOGRAM TRACING: CPT | Performed by: PHYSICIAN ASSISTANT

## 2022-05-02 PROCEDURE — 71045 X-RAY EXAM CHEST 1 VIEW: CPT

## 2022-05-02 PROCEDURE — 93010 ELECTROCARDIOGRAM REPORT: CPT | Performed by: INTERNAL MEDICINE

## 2022-05-02 PROCEDURE — 85610 PROTHROMBIN TIME: CPT

## 2022-05-02 PROCEDURE — 6370000000 HC RX 637 (ALT 250 FOR IP): Performed by: EMERGENCY MEDICINE

## 2022-05-02 PROCEDURE — 99285 EMERGENCY DEPT VISIT HI MDM: CPT

## 2022-05-02 PROCEDURE — 6360000002 HC RX W HCPCS: Performed by: NURSE PRACTITIONER

## 2022-05-02 PROCEDURE — 84484 ASSAY OF TROPONIN QUANT: CPT

## 2022-05-02 PROCEDURE — 6370000000 HC RX 637 (ALT 250 FOR IP): Performed by: PHYSICIAN ASSISTANT

## 2022-05-02 PROCEDURE — 2580000003 HC RX 258: Performed by: INTERNAL MEDICINE

## 2022-05-02 PROCEDURE — 70450 CT HEAD/BRAIN W/O DYE: CPT

## 2022-05-02 RX ORDER — ASPIRIN 81 MG/1
81 TABLET, CHEWABLE ORAL DAILY
Status: DISCONTINUED | OUTPATIENT
Start: 2022-05-03 | End: 2022-05-05 | Stop reason: HOSPADM

## 2022-05-02 RX ORDER — ASPIRIN 325 MG
325 TABLET ORAL ONCE
Status: COMPLETED | OUTPATIENT
Start: 2022-05-02 | End: 2022-05-02

## 2022-05-02 RX ORDER — KETOROLAC TROMETHAMINE 30 MG/ML
30 INJECTION, SOLUTION INTRAMUSCULAR; INTRAVENOUS ONCE
Status: COMPLETED | OUTPATIENT
Start: 2022-05-02 | End: 2022-05-02

## 2022-05-02 RX ORDER — ENOXAPARIN SODIUM 100 MG/ML
40 INJECTION SUBCUTANEOUS DAILY
Status: DISCONTINUED | OUTPATIENT
Start: 2022-05-02 | End: 2022-05-05 | Stop reason: HOSPADM

## 2022-05-02 RX ORDER — SODIUM CHLORIDE 0.9 % (FLUSH) 0.9 %
5-40 SYRINGE (ML) INJECTION EVERY 12 HOURS SCHEDULED
Status: DISCONTINUED | OUTPATIENT
Start: 2022-05-02 | End: 2022-05-05 | Stop reason: HOSPADM

## 2022-05-02 RX ORDER — POLYETHYLENE GLYCOL 3350 17 G/17G
17 POWDER, FOR SOLUTION ORAL DAILY PRN
Status: DISCONTINUED | OUTPATIENT
Start: 2022-05-02 | End: 2022-05-05 | Stop reason: HOSPADM

## 2022-05-02 RX ORDER — SODIUM CHLORIDE 0.9 % (FLUSH) 0.9 %
5-40 SYRINGE (ML) INJECTION PRN
Status: DISCONTINUED | OUTPATIENT
Start: 2022-05-02 | End: 2022-05-05 | Stop reason: HOSPADM

## 2022-05-02 RX ORDER — ACETAMINOPHEN 650 MG/1
650 SUPPOSITORY RECTAL EVERY 6 HOURS PRN
Status: DISCONTINUED | OUTPATIENT
Start: 2022-05-02 | End: 2022-05-05 | Stop reason: HOSPADM

## 2022-05-02 RX ORDER — SODIUM CHLORIDE 9 MG/ML
INJECTION, SOLUTION INTRAVENOUS PRN
Status: DISCONTINUED | OUTPATIENT
Start: 2022-05-02 | End: 2022-05-05 | Stop reason: HOSPADM

## 2022-05-02 RX ORDER — ACETAMINOPHEN 500 MG
1000 TABLET ORAL ONCE
Status: COMPLETED | OUTPATIENT
Start: 2022-05-02 | End: 2022-05-02

## 2022-05-02 RX ORDER — NITROGLYCERIN 0.4 MG/1
0.4 TABLET SUBLINGUAL EVERY 5 MIN PRN
Status: DISCONTINUED | OUTPATIENT
Start: 2022-05-02 | End: 2022-05-05 | Stop reason: HOSPADM

## 2022-05-02 RX ORDER — ACETAMINOPHEN 325 MG/1
650 TABLET ORAL EVERY 6 HOURS PRN
Status: DISCONTINUED | OUTPATIENT
Start: 2022-05-02 | End: 2022-05-05 | Stop reason: HOSPADM

## 2022-05-02 RX ORDER — ATORVASTATIN CALCIUM 40 MG/1
40 TABLET, FILM COATED ORAL NIGHTLY
Status: DISCONTINUED | OUTPATIENT
Start: 2022-05-02 | End: 2022-05-05 | Stop reason: HOSPADM

## 2022-05-02 RX ORDER — ONDANSETRON 2 MG/ML
4 INJECTION INTRAMUSCULAR; INTRAVENOUS EVERY 6 HOURS PRN
Status: DISCONTINUED | OUTPATIENT
Start: 2022-05-02 | End: 2022-05-05 | Stop reason: HOSPADM

## 2022-05-02 RX ORDER — ONDANSETRON 4 MG/1
4 TABLET, ORALLY DISINTEGRATING ORAL EVERY 8 HOURS PRN
Status: DISCONTINUED | OUTPATIENT
Start: 2022-05-02 | End: 2022-05-05 | Stop reason: HOSPADM

## 2022-05-02 RX ORDER — HYDRALAZINE HYDROCHLORIDE 20 MG/ML
10 INJECTION INTRAMUSCULAR; INTRAVENOUS EVERY 4 HOURS PRN
Status: DISCONTINUED | OUTPATIENT
Start: 2022-05-02 | End: 2022-05-05 | Stop reason: HOSPADM

## 2022-05-02 RX ORDER — LOSARTAN POTASSIUM 25 MG/1
25 TABLET ORAL DAILY
Status: DISCONTINUED | OUTPATIENT
Start: 2022-05-02 | End: 2022-05-05 | Stop reason: HOSPADM

## 2022-05-02 RX ADMIN — ATORVASTATIN CALCIUM 40 MG: 40 TABLET, FILM COATED ORAL at 20:41

## 2022-05-02 RX ADMIN — KETOROLAC TROMETHAMINE 30 MG: 30 INJECTION, SOLUTION INTRAMUSCULAR at 21:48

## 2022-05-02 RX ADMIN — NITROGLYCERIN 0.5 INCH: 20 OINTMENT TOPICAL at 16:02

## 2022-05-02 RX ADMIN — SODIUM CHLORIDE, PRESERVATIVE FREE 10 ML: 5 INJECTION INTRAVENOUS at 20:41

## 2022-05-02 RX ADMIN — ENOXAPARIN SODIUM 40 MG: 40 INJECTION SUBCUTANEOUS at 19:30

## 2022-05-02 RX ADMIN — ACETAMINOPHEN 1000 MG: 500 TABLET ORAL at 16:08

## 2022-05-02 RX ADMIN — ONDANSETRON 4 MG: 2 INJECTION INTRAMUSCULAR; INTRAVENOUS at 21:51

## 2022-05-02 RX ADMIN — LOSARTAN POTASSIUM 25 MG: 25 TABLET, FILM COATED ORAL at 19:13

## 2022-05-02 RX ADMIN — ACETAMINOPHEN 650 MG: 325 TABLET ORAL at 19:12

## 2022-05-02 RX ADMIN — ASPIRIN 325 MG: 325 TABLET ORAL at 16:03

## 2022-05-02 ASSESSMENT — PAIN SCALES - GENERAL
PAINLEVEL_OUTOF10: 6
PAINLEVEL_OUTOF10: 2
PAINLEVEL_OUTOF10: 5
PAINLEVEL_OUTOF10: 6
PAINLEVEL_OUTOF10: 10
PAINLEVEL_OUTOF10: 6
PAINLEVEL_OUTOF10: 7

## 2022-05-02 ASSESSMENT — ENCOUNTER SYMPTOMS
ABDOMINAL PAIN: 0
SHORTNESS OF BREATH: 1
COLOR CHANGE: 0
COUGH: 0
VOMITING: 0
EYES NEGATIVE: 1
BACK PAIN: 0
NAUSEA: 0

## 2022-05-02 ASSESSMENT — HEART SCORE: ECG: 0

## 2022-05-02 ASSESSMENT — PAIN DESCRIPTION - ORIENTATION: ORIENTATION: LEFT

## 2022-05-02 ASSESSMENT — PAIN DESCRIPTION - PAIN TYPE
TYPE: ACUTE PAIN

## 2022-05-02 ASSESSMENT — LIFESTYLE VARIABLES: HOW OFTEN DO YOU HAVE A DRINK CONTAINING ALCOHOL: NEVER

## 2022-05-02 ASSESSMENT — PAIN - FUNCTIONAL ASSESSMENT
PAIN_FUNCTIONAL_ASSESSMENT: ACTIVITIES ARE NOT PREVENTED
PAIN_FUNCTIONAL_ASSESSMENT: 0-10
PAIN_FUNCTIONAL_ASSESSMENT: ACTIVITIES ARE NOT PREVENTED

## 2022-05-02 ASSESSMENT — PAIN DESCRIPTION - DESCRIPTORS
DESCRIPTORS: ACHING

## 2022-05-02 ASSESSMENT — PAIN DESCRIPTION - LOCATION
LOCATION: CHEST
LOCATION: HEAD
LOCATION: HEAD

## 2022-05-02 NOTE — ED PROVIDER NOTES
[address] [facility]  ED  EMERGENCY DEPARTMENT ENCOUNTER        Pt Name: Daniel Clarke  MRN: [id number]  [place] 1980  Date of evaluation: 5/2/2022  Provider: Raven Cardenas PA-C  PCP: So Colunga DO  ED Attending: Tyra Keita MD      This patient was seen by the attending provider     History provided by the patient    CHIEF COMPLAINT:     Chief Complaint   Patient presents with    Headache     left hand weakness x 1 week progressively getting worse. left leg weakness started today. pt reports he went to bed around midnight last night with a headache and states it's continued into today.  Extremity Weakness       HISTORY OF PRESENT ILLNESS:      Daniel Clarke is a 42 y.o. male who arrives to the ED by private vehicle. This patient arrives to the ED reporting a headache off and on along with left hand weakness for the last 1 week. He reports weakness and picking things up and has dropped some things. He notices this particularly when he is working as he works as a . He has dropped a kitchen utensils, etc.  Over the course of the last week the patient also admits to intermittent left-sided chest pain that at times presents with shortness of breath and diaphoresis. He has very mild chest pain on arrival to the ED today. Today, he started feeling his left leg was \"heavy\". The combination of symptoms ultimately brought him here. He states he did not come sooner because he had to work. Patient has no known heart disease but has never really been evaluated. He has hypertension hyperlipidemia, otherwise not on meds currently. He is obese, smokes and reports family history of heart disease. Father had his first MI in his early 46s. No identifiable exacerbating or alleviating factors to symptoms. Nursing Notes were reviewed     REVIEW OF SYSTEMS:     Review of Systems   Constitutional: Positive for diaphoresis. Negative for appetite change, chills and fever.    HENT: Negative. Eyes: Negative. Respiratory: Positive for shortness of breath. Negative for cough. Cardiovascular: Positive for chest pain. Negative for palpitations and leg swelling. Gastrointestinal: Negative for abdominal pain, nausea and vomiting. Genitourinary: Negative. Musculoskeletal: Negative for back pain, gait problem and neck pain. Skin: Negative for color change. Neurological: Positive for weakness (left hand and left leg). Negative for dizziness and headaches. All other systems reviewed and are negative. Except as noted above in the ROS, all other systems were reviewed and negative. PAST MEDICAL HISTORY:     Past Medical History:   Diagnosis Date    Migraine     Pneumonia     Seizures ([facility] [address]     temporal seizures as a kid         SURGICAL HISTORY:      Past Surgical History:   Procedure Laterality Date    FOOT SURGERY      HAND SURGERY      HERNIA REPAIR      KNEE ARTHROSCOPY           CURRENT MEDICATIONS:       Previous Medications    ALBUTEROL SULFATE HFA (PROVENTIL HFA) 108 (90 BASE) MCG/ACT INHALER    Inhale 2 puffs into the lungs every 4 hours as needed for Wheezing or Shortness of Breath (Space out to every 6 hours as symptoms improve) Space out to every 6 hours as symptoms improve. ASCORBIC ACID (VITAMIN C) 500 MG TABLET    Take 1 tablet by mouth 2 times daily for 7 days    DICYCLOMINE (BENTYL) 10 MG CAPSULE    Take 10 mg by mouth 3 times daily as needed    GABAPENTIN (NEURONTIN) 300 MG CAPSULE    Take 300 mg by mouth 3 times daily as needed. HYDROCHLOROTHIAZIDE (MICROZIDE) 12.5 MG CAPSULE    Take 12.5 mg by mouth daily    HYDROCODONE-ACETAMINOPHEN (NORCO) 5-325 MG PER TABLET        IBUPROFEN (IBU) 800 MG TABLET    Take 1 tablet by mouth every 8 hours as needed for Pain. LANSOPRAZOLE (PREVACID PO)    Take  by mouth.     LOSARTAN-HYDROCHLOROTHIAZIDE (HYZAAR) 100-12.5 MG PER TABLET    Take 1 tablet by mouth daily    MELOXICAM (MOBIC) 15 MG TABLET    Take 15 mg by mouth daily    NAPROXEN (NAPROSYN) 500 MG TABLET    Take 1 tablet by mouth 2 times daily for 10 days. TRIAMCINOLONE (KENALOG) 0.1 % CREAM    Apply topically 2 times daily    ZINC SULFATE (ZINCATE) 220 (50 ZN) MG CAPSULE    Take 1 capsule by mouth daily for 7 days         ALLERGIES:    Azithromycin, Ambien [zolpidem tartrate], Codeine, Lidocaine hcl, Procaine hcl, Aloe, and Tramadol    FAMILY HISTORY:     History reviewed. No pertinent family history. SOCIAL HISTORY:       Social History     Socioeconomic History    Marital status:      Spouse name: None    Number of children: None    Years of education: None    Highest education level: None   Occupational History    None   Tobacco Use    Smoking status: Current Some Day Smoker     Types: Pipe, Cigars    Smokeless tobacco: Never Used   Substance and Sexual Activity    Alcohol use: Yes     Alcohol/week: 0.0 standard drinks     Comment: occasional    Drug use: No    Sexual activity: None   Other Topics Concern    None   Social History Narrative    None     Social Determinants of Health     Financial Resource Strain:     Difficulty of Paying Living Expenses: Not on file   Food Insecurity:     Worried About Running Out of Food in the Last Year: Not on file    Anastasiia of Food in the Last Year: Not on file   Transportation Needs:     Lack of Transportation (Medical): Not on file    Lack of Transportation (Non-Medical):  Not on file   Physical Activity:     Days of Exercise per Week: Not on file    Minutes of Exercise per Session: Not on file   Stress:     Feeling of Stress : Not on file   Social Connections:     Frequency of Communication with Friends and Family: Not on file    Frequency of Social Gatherings with Friends and Family: Not on file    Attends Muslim Services: Not on file    Active Member of Clubs or Organizations: Not on file    Attends Club or Organization Meetings: Not on file    Marital Status: Not on file   Intimate Partner Violence:     Fear of Current or Ex-Partner: Not on file    Emotionally Abused: Not on file    Physically Abused: Not on file    Sexually Abused: Not on file   Housing Stability:     Unable to Pay for Housing in the Last Year: Not on file    Number of [place] in the Last Year: Not on file    Unstable Housing in the Last Year: Not on file       SCREENINGS:   NIH Stroke Scale  Interval: Baseline  Level of Consciousness (1a): Alert  LOC Questions (1b): Answers both correctly  LOC Commands (1c): Performs both tasks correctly  Best Gaze (2): Normal  Visual (3): No visual loss  Facial Palsy (4): Normal symmetrical movement  Motor Arm, Left (5a): No drift  Motor Arm, Right (5b): No drift  Motor Leg, Left (6a): No drift  Motor Leg, Right (6b): No drift  Limb Ataxia (7): Absent  Sensory (8): Normal  Best Language (9): No aphasia  Dysarthria (10): Normal  Extinction and Inattention (11): No abnormality  Total: 0Glasgow Coma Scale  Eye Opening: Spontaneous  Best Verbal Response: Oriented  Best Motor Response: Obeys commands  John Coma Scale Score: 15 Heart Score for chest pain patients  History: Moderately Suspicious  ECG: Normal  Patient Age: < 39 years  *Risk factors for Atherosclerotic disease: Cigarette smoking,Hypercholesterolemia,Hypertension,Obesity,Positive family History  Risk Factors: > 3 Risk factors or history of atherosclerotic disease*  Troponin: < 1X normal limit  Heart Score Total: 3      PHYSICAL EXAM:       ED Triage Vitals [05/02/22 1411]   BP Temp Temp Source Pulse Resp SpO2 Height Weight   (!) 170/90 99.1 °F (37.3 °C) Oral 78 17 98 % 5' 3\" (1.6 m) 215 lb (97.5 kg)       Physical Exam    CONSTITUTIONAL: Awake and alert. Cooperative. Well-developed. Well-nourished. Non-toxic. No acute distress. HENT: Normocephalic. Atraumatic. External ears normal, without discharge. No nasal discharge. Oropharynx clear. Mucous membranes moist.  EYES: Conjunctiva non-injected.  No scleral icterus. PERRL. EOM's grossly intact. NECK: Supple. Normal ROM. CARDIOVASCULAR: RRR. No Murmer. Intact distal pulses. PULMONARY/CHEST WALL: Effort normal. No tachypnea. Lungs clear to ausculation. ABDOMEN: Normal BS. Soft. Nondistended. No tenderness to palpate. No guarding. /ANORECTAL: Not assessed  MUSKULOSKELETAL: Normal ROM. No acute deformities. No edema. No tenderness to palpate. SKIN: Warm and dry. No rash. NEUROLOGICAL: Alert and oriented x 3. GCS 15. NIH 0. CN II-XII grossly intact. Strength is 5/5 in all extremities and sensation is intact. No drift. Normal gait.    PSYCHIATRIC: Normal affect        DIAGNOSTICRESULTS:     LABS:    Results for orders placed or performed during the hospital encounter of 05/02/22   CBC with Auto Differential   Result Value Ref Range    WBC 5.8 4.0 - 11.0 K/uL    RBC 5.10 4.20 - 5.90 M/uL    Hemoglobin 14.9 13.5 - 17.5 g/dL    Hematocrit 43.2 40.5 - 52.5 %    MCV 84.8 80.0 - 100.0 fL    MCH 29.1 26.0 - 34.0 pg    MCHC 34.4 31.0 - 36.0 g/dL    RDW 13.5 12.4 - 15.4 %    Platelets 419 268 - 317 K/uL    MPV 7.8 5.0 - 10.5 fL    Neutrophils % 66.8 %    Lymphocytes % 22.6 %    Monocytes % 6.6 %    Eosinophils % 3.2 %    Basophils % 0.8 %    Neutrophils Absolute 3.9 1.7 - 7.7 K/uL    Lymphocytes Absolute 1.3 1.0 - 5.1 K/uL    Monocytes Absolute 0.4 0.0 - 1.3 K/uL    Eosinophils Absolute 0.2 0.0 - 0.6 K/uL    Basophils Absolute 0.0 0.0 - 0.2 K/uL   Comprehensive Metabolic Panel w/ Reflex to MG   Result Value Ref Range    Sodium 140 136 - 145 mmol/L    Potassium reflex Magnesium 3.9 3.5 - 5.1 mmol/L    Chloride 102 99 - 110 mmol/L    CO2 26 21 - 32 mmol/L    Anion Gap 12 3 - 16    Glucose 116 (H) 70 - 99 mg/dL    BUN 19 7 - 20 mg/dL    CREATININE 0.8 (L) 0.9 - 1.3 mg/dL    GFR Non-African American >60 >60    GFR African American >60 >60    Calcium 10.8 (H) 8.3 - 10.6 mg/dL    Total Protein 7.8 6.4 - 8.2 g/dL    Albumin 5.2 (H) 3.4 - 5.0 g/dL    Albumin/Globulin Ratio 2.0 1.1 - 2.2    Total Bilirubin 3.4 (H) 0.0 - 1.0 mg/dL    Alkaline Phosphatase 86 40 - 129 U/L    ALT 33 10 - 40 U/L    AST 20 15 - 37 U/L   Troponin   Result Value Ref Range    Troponin <0.01 <0.01 ng/mL   Protime-INR   Result Value Ref Range    Protime 11.5 9.9 - 12.7 sec    INR 1.02 0.88 - 1.12   EKG 12 Lead   Result Value Ref Range    Ventricular Rate 70 BPM    Atrial Rate 70 BPM    P-R Interval 152 ms    QRS Duration 92 ms    Q-T Interval 386 ms    QTc Calculation (Bazett) 416 ms    P Axis 17 degrees    R Axis -2 degrees    T Axis 14 degrees    Diagnosis       Normal sinus rhythm with sinus arrhythmiaNormal ECGConfirmed by ELIECER HARDWICK MD ([id number]) on 5/2/2022 3:30:25 PM         RADIOLOGY:  All x-ray studies areviewed/reviewed by me. Formal interpretations per the radiologist are as follows:      CT HEAD WO CONTRAST    Result Date: 5/2/2022  EXAMINATION: CT OF THE HEAD WITHOUT CONTRAST  5/2/2022 2:43 pm TECHNIQUE: CT of the head was performed without the administration of intravenous contrast. Dose modulation, iterative reconstruction, and/or weight based adjustment of the mA/kV was utilized to reduce the radiation dose to as low as reasonably achievable. COMPARISON: March 22, 2022 HISTORY: ORDERING SYSTEM PROVIDED HISTORY: left hand weakness, left leg \"heaviness\" TECHNOLOGIST PROVIDED HISTORY: Reason for exam:->left hand weakness, left leg \"heaviness\" Has a \"code stroke\" or \"stroke alert\" been called? ->No Decision Support Exception - unselect if not a suspected or confirmed emergency medical condition->Emergency Medical Condition (MA) Reason for Exam: headache, chest pain this am, left arm and leg numbness/weakness x1 week FINDINGS: BRAIN/VENTRICLES: There is no acute intracranial hemorrhage, mass effect or midline shift. No abnormal extra-axial fluid collection. The gray-white differentiation is maintained without evidence of an acute infarct. There is no evidence of hydrocephalus.  ORBITS: The visualized portion of the orbits demonstrate no acute abnormality. SINUSES: The visualized paranasal sinuses and mastoid air cells demonstrate no acute abnormality. SOFT TISSUES/SKULL:  No acute abnormality of the visualized skull or soft tissues. No acute intracranial abnormality. XR CHEST PORTABLE    Result Date: 5/2/2022  EXAMINATION: ONE XRAY VIEW OF THE CHEST 5/2/2022 2:34 pm COMPARISON: Chest x-ray dated 11/14/2021 HISTORY: ORDERING SYSTEM PROVIDED HISTORY: chest pain TECHNOLOGIST PROVIDED HISTORY: Reason for exam:->chest pain Reason for Exam: cp FINDINGS: Clear lungs. No pleural effusion or pneumothorax. Cardiomediastinal silhouette is unremarkable. Visualized osseous structures are unremarkable. Clear lungs. EKG: The Ekg interpreted by me in the absence of a cardiologist shows. normal sinus rhythm with a rate of 70    See also interpretation by Dante Han MD.      PROCEDURES:   N/A    CRITICAL CARE TIME:       Due to the immediate potential for life-threatening deterioration due to concern for CVA/TIA as well as moderate risk chest pain, I spent 21 minutes providing critical care. This time is excluding time spent performing procedures. CONSULTS:  IP CONSULT TO HOSPITALIST      EMERGENCY DEPARTMENT COURSE and DIFFERENTIAL DIAGNOSIS/MDM:   Vitals:    Vitals:    05/02/22 1411 05/02/22 1437   BP: (!) 170/90 (!) 173/95   Pulse: 78 81   Resp: 17 22   Temp: 99.1 °F (37.3 °C)    TempSrc: Oral    SpO2: 98% 97%   Weight: 215 lb (97.5 kg)    Height: 5' 3\" (1.6 m)        Patient was given the following medications:  Medications   aspirin tablet 325 mg (has no administration in time range)   nitroglycerin (NITRO-BID) 2 % ointment 0.5 inch (has no administration in time range)         Patient was evaluated by both myself and Dante Han MD.   Old records were reviewed. Patient is here describing onset of headache and left hand weakness 1 week ago.   Symptoms have been off and on but he believes the left hand is progressively more weak and admits to dropping things, at times. He also has had chest pain off and on for a week with associated shortness of breath and diaphoresis. Today his left leg became heavy. The combination of symptoms brought him. He is an obese 79-year-old male who smokes, has on treated hypertension, hyperlipidemia as well as family history of heart disease. His initial exam revealed NIH score of 0. No objective weakness on physical exam.  EKG, CT head, chest x-ray and labs ordered. Patient ultimately given aspirin and Nitropaste. EKG normal sinus rhythm rate 70 bpm  CT head no acute intracranial normality  Portable chest x-ray clear lungs  CBC normal  CMP unremarkable  Troponin negative  Coags normal  Through his time in the ED the patient has been little hypertensive, mainly 170 over 90s but outside of this is normal vital signs. NIH score is 0. Heart score is 3. However, given the reported left hand and leg weakness/heaviness and the significant risk factors he has for heart disease, he warrants hospitalization. I am consulting the hospitalist at this time. I spoke with Dr. Severo Elliot. We thoroughly discussed the history, physical exam, laboratory and imaging studies, as well as, emergency department course. Based upon that discussion, we've decided to admit Daniel Clarke for further observation and evaluation of Ramon Hinds's chest pain and left side weakness. As I have deemed necessary from their history, physical, and studies, I have considered and evaluated Daniel Clarke for the following diagnoses:  TIA/CVA, ACUTE CORONARY SYNDROME, PERICARDIAL TAMPONADE, CHF, SEPSIS, COPD EXACERBATION, PNEUMONIA, PNEUMOTHORAX, PULMONARY EMBOLISM, and THORACIC DISSECTION. FINAL IMPRESSION:      1. Chest pain, unspecified type    2. Uncontrolled hypertension    3. Left hand weakness    4.  Weakness of left lower extremity          DISPOSITION/PLAN: DISPOSITION     ADMIT               (Please note thatportions of this note were completed with a voice recognition program.  Efforts were made to edit the dictations, but occasionally words are mis-transcribed.)    Keegan Davenport PA-C (electronicallysigned)              Eileen Muro [place]  05/02/22 1671

## 2022-05-02 NOTE — ED PROVIDER NOTES
I independently performed a history and physical on [address] All diagnostic, treatment, and disposition decisions were made by myself in conjunction with the advanced practice provider. EKG: NSR, rate 70, normal axis, QTC within normal limits, no acute ST or T wave changes from prior on March 22, 2022    Patient with some symptoms including left hand weakness and sensation of his left leg feeling heavy which are worrisome for possible TIA. Also with a concerning description of left-sided chest pain with associated diaphoresis. He has risk factors as well as a strong family history. Plan for hospitalist admission. For further details of [facility] emergency department encounter, please see Sera GARCIA's documentation.              Chente Mendez MD  05/02/22 [id number]

## 2022-05-02 NOTE — LETTER
[address] [address]  Phone: [phone]             May 5, 2022    Patient: Doug Lou   YOB: 1980   Date of Visit: 5/2/2022       To Whom It May Concern:    Norma Pike was seen and treated in our facility  beginning 5/2/2022 until 5/5/2022. He may return to work on 5/11/2022.       Sincerely,       Jg Avila RN         Signature:__________________________________

## 2022-05-03 ENCOUNTER — APPOINTMENT (OUTPATIENT)
Dept: MRI IMAGING | Age: 42
DRG: 192 | End: 2022-05-03
Payer: MEDICAID

## 2022-05-03 ENCOUNTER — APPOINTMENT (OUTPATIENT)
Dept: NUCLEAR MEDICINE | Age: 42
DRG: 192 | End: 2022-05-03
Payer: MEDICAID

## 2022-05-03 LAB
ALBUMIN SERPL-MCNC: 4.8 G/DL (ref 3.4–5)
ANION GAP SERPL CALCULATED.3IONS-SCNC: 11 MMOL/L (ref 3–16)
BUN BLDV-MCNC: 21 MG/DL (ref 7–20)
CALCIUM SERPL-MCNC: 10.5 MG/DL (ref 8.3–10.6)
CHLORIDE BLD-SCNC: 103 MMOL/L (ref 99–110)
CHOLESTEROL, TOTAL: 236 MG/DL (ref 0–199)
CO2: 28 MMOL/L (ref 21–32)
CREAT SERPL-MCNC: 0.9 MG/DL (ref 0.9–1.3)
EKG ATRIAL RATE: 54 BPM
EKG DIAGNOSIS: NORMAL
EKG P AXIS: 26 DEGREES
EKG P-R INTERVAL: 168 MS
EKG Q-T INTERVAL: 432 MS
EKG QRS DURATION: 92 MS
EKG QTC CALCULATION (BAZETT): 409 MS
EKG R AXIS: 4 DEGREES
EKG T AXIS: 11 DEGREES
EKG VENTRICULAR RATE: 54 BPM
GFR AFRICAN AMERICAN: >60
GFR NON-AFRICAN AMERICAN: >60
GLUCOSE BLD-MCNC: 117 MG/DL (ref 70–99)
HCT VFR BLD CALC: 42 % (ref 40.5–52.5)
HDLC SERPL-MCNC: 30 MG/DL (ref 40–60)
HEMOGLOBIN: 14.3 G/DL (ref 13.5–17.5)
LDL CHOLESTEROL CALCULATED: 174 MG/DL
LV EF: 60 %
LVEF MODALITY: NORMAL
MCH RBC QN AUTO: 29.1 PG (ref 26–34)
MCHC RBC AUTO-ENTMCNC: 34 G/DL (ref 31–36)
MCV RBC AUTO: 85.5 FL (ref 80–100)
PDW BLD-RTO: 13.5 % (ref 12.4–15.4)
PHOSPHORUS: 4.2 MG/DL (ref 2.5–4.9)
PLATELET # BLD: 344 K/UL (ref 135–450)
PMV BLD AUTO: 7.7 FL (ref 5–10.5)
POTASSIUM SERPL-SCNC: 3.9 MMOL/L (ref 3.5–5.1)
RBC # BLD: 4.92 M/UL (ref 4.2–5.9)
SODIUM BLD-SCNC: 142 MMOL/L (ref 136–145)
TRIGL SERPL-MCNC: 162 MG/DL (ref 0–150)
VLDLC SERPL CALC-MCNC: 32 MG/DL
WBC # BLD: 7.2 K/UL (ref 4–11)

## 2022-05-03 PROCEDURE — 93005 ELECTROCARDIOGRAM TRACING: CPT | Performed by: INTERNAL MEDICINE

## 2022-05-03 PROCEDURE — 6370000000 HC RX 637 (ALT 250 FOR IP): Performed by: INTERNAL MEDICINE

## 2022-05-03 PROCEDURE — 93017 CV STRESS TEST TRACING ONLY: CPT

## 2022-05-03 PROCEDURE — 6360000002 HC RX W HCPCS: Performed by: INTERNAL MEDICINE

## 2022-05-03 PROCEDURE — 1200000000 HC SEMI PRIVATE

## 2022-05-03 PROCEDURE — 85027 COMPLETE CBC AUTOMATED: CPT

## 2022-05-03 PROCEDURE — A9502 TC99M TETROFOSMIN: HCPCS | Performed by: INTERNAL MEDICINE

## 2022-05-03 PROCEDURE — 80061 LIPID PANEL: CPT

## 2022-05-03 PROCEDURE — 36415 COLL VENOUS BLD VENIPUNCTURE: CPT

## 2022-05-03 PROCEDURE — 70551 MRI BRAIN STEM W/O DYE: CPT

## 2022-05-03 PROCEDURE — 93010 ELECTROCARDIOGRAM REPORT: CPT | Performed by: INTERNAL MEDICINE

## 2022-05-03 PROCEDURE — 78452 HT MUSCLE IMAGE SPECT MULT: CPT

## 2022-05-03 PROCEDURE — 3430000000 HC RX DIAGNOSTIC RADIOPHARMACEUTICAL: Performed by: INTERNAL MEDICINE

## 2022-05-03 PROCEDURE — 2580000003 HC RX 258: Performed by: INTERNAL MEDICINE

## 2022-05-03 PROCEDURE — 80069 RENAL FUNCTION PANEL: CPT

## 2022-05-03 RX ADMIN — ATORVASTATIN CALCIUM 40 MG: 40 TABLET, FILM COATED ORAL at 21:09

## 2022-05-03 RX ADMIN — SODIUM CHLORIDE, PRESERVATIVE FREE 5 ML: 5 INJECTION INTRAVENOUS at 08:51

## 2022-05-03 RX ADMIN — ASPIRIN 81 MG 81 MG: 81 TABLET ORAL at 08:50

## 2022-05-03 RX ADMIN — TETROFOSMIN 33 MILLICURIE: 1.38 INJECTION, POWDER, LYOPHILIZED, FOR SOLUTION INTRAVENOUS at 14:02

## 2022-05-03 RX ADMIN — SODIUM CHLORIDE, PRESERVATIVE FREE 10 ML: 5 INJECTION INTRAVENOUS at 21:10

## 2022-05-03 RX ADMIN — LOSARTAN POTASSIUM 25 MG: 25 TABLET, FILM COATED ORAL at 08:50

## 2022-05-03 RX ADMIN — REGADENOSON 0.4 MG: 0.08 INJECTION, SOLUTION INTRAVENOUS at 14:02

## 2022-05-03 RX ADMIN — TETROFOSMIN 11.3 MILLICURIE: 1.38 INJECTION, POWDER, LYOPHILIZED, FOR SOLUTION INTRAVENOUS at 12:44

## 2022-05-03 ASSESSMENT — PAIN SCALES - GENERAL
PAINLEVEL_OUTOF10: 0

## 2022-05-03 NOTE — PROGRESS NOTES
Md notified: Pt reports worsening headache 10/10 no CP now had had nitro patch, patch has been removed. Ice and tyleonl given without relief. Pt requesting additional pain relief for headache.

## 2022-05-03 NOTE — H&P
Hospital Medicine History & Physical      PCP: Lanre Teran DO    Date of Admission: 5/2/2022    Date of Service: Pt seen/examined on 3 May and Admitted to Inpatient with expected LOS greater than two midnights due to medical therapy. Chief Complaint:  Chest Pain as well as HA w/ L hand weakness. History Of Present Illness:       42 y.o. male w/out personal hx of CAD but w/ family hx and multiple risk factors who presented to [facility] with a 1 week hx of intermittent L hand weakness, worse PTArrival w/ associated HA. More concerning was a hx of intermittent moderate severity not necessarily exertional L sided chest pain w/ associated SOB/Diaphoresis. The patient denies any fever/chills or other signs/sxs of systemic illness or identifiable aggravating/alleviating factors. Past Medical History:          Diagnosis Date    Hypertension     Migraine     Pneumonia     Seizures (HCC)     temporal seizures as a kid       Past Surgical History:          Procedure Laterality Date    FOOT SURGERY      HAND SURGERY      HERNIA REPAIR      KNEE ARTHROSCOPY         Medications Prior to Admission:      Prior to Admission medications    Medication Sig Start Date End Date Taking? Authorizing Provider   triamcinolone (KENALOG) 0.1 % cream Apply topically 2 times daily 3/31/22  Yes Ceferino Stone PA-C   dicyclomine (BENTYL) 10 MG capsule Take 10 mg by mouth 3 times daily as needed 8/27/21  Yes Historical Provider, MD   gabapentin (NEURONTIN) 300 MG capsule Take 300 mg by mouth 3 times daily as needed.  12/3/21  Yes Historical Provider, MD   losartan-hydroCHLOROthiazide (HYZAAR) 100-12.5 MG per tablet Take 1 tablet by mouth daily 9/8/21  Yes Historical Provider, MD   meloxicam (MOBIC) 15 MG tablet Take 15 mg by mouth daily 8/27/21  Yes Historical Provider, MD   hydroCHLOROthiazide (MICROZIDE) 12.5 MG capsule Take 12.5 mg by mouth daily   Yes Historical Provider, MD HYDROcodone-acetaminophen (NORCO) 5-325 MG per tablet  11/5/21  Yes Historical Provider, MD   albuterol sulfate HFA (PROVENTIL HFA) 108 (90 Base) MCG/ACT inhaler Inhale 2 puffs into the lungs every 4 hours as needed for Wheezing or Shortness of Breath (Space out to every 6 hours as symptoms improve) Space out to every 6 hours as symptoms improve. 11/9/21  Yes Annelle Baumgarten, APRN - CNP   ibuprofen (IBU) 800 MG tablet Take 1 tablet by mouth every 8 hours as needed for Pain. 2/18/14  Yes Jana Cancer, PA-MATTHEW   Lansoprazole (PREVACID PO) Take  by mouth. Yes Historical Provider, MD   ascorbic acid (VITAMIN C) 500 MG tablet Take 1 tablet by mouth 2 times daily for 7 days 1/27/22 2/3/22  JOSE Alvarez   zinc sulfate (ZINCATE) 220 (50 Zn) MG capsule Take 1 capsule by mouth daily for 7 days 1/27/22 2/3/22  JOSE Alvarez   naproxen (NAPROSYN) 500 MG tablet Take 1 tablet by mouth 2 times daily for 10 days. 9/4/13 9/14/13  Eliana Palmer MD       Allergies:  Azithromycin, Ambien [zolpidem tartrate], Codeine, Lidocaine hcl, Procaine hcl, Aloe, and Tramadol    Social History:      The patient currently lives independently    TOBACCO:   reports that he has been smoking pipe and cigars. He has never used smokeless tobacco.  ETOH:   reports current alcohol use. Family History:      Reviewed in detail and negative for DM, CAD, Cancer, CVA. Positive as follows:    History reviewed. No pertinent family history. REVIEW OF SYSTEMS:   Pertinent positives as noted in the HPI. All other systems reviewed and negative. PHYSICAL EXAM:    BP (!) 148/95   Pulse 57   Temp 98 °F (36.7 °C) (Oral)   Resp 18   Ht 5' 3\" (1.6 m)   Wt 215 lb (97.5 kg)   SpO2 97%   BMI 38.09 kg/m²     General appearance:  No apparent distress, appears stated age and cooperative. HEENT:  Normal cephalic, atraumatic without obvious deformity. Pupils equal, round, and reactive to light. Extra ocular muscles intact. Conjunctivae/corneas clear. Neck: Supple, with full range of motion. No jugular venous distention. Trachea midline. Respiratory:  Normal respiratory effort. Clear to auscultation, bilaterally without Rales/Wheezes/Rhonchi. Cardiovascular:  Regular rate and rhythm with normal S1/S2 without murmurs, rubs or gallops. Abdomen: Soft, non-tender, non-distended with normal bowel sounds. Musculoskeletal:  No clubbing, cyanosis or edema bilaterally. Full range of motion without deformity. Skin: Skin color, texture, turgor normal.  No rashes or lesions. Neurologic:  Neurovascularly intact without any focal sensory/motor deficits.  Cranial nerves: II-XII intact, grossly non-focal.  Psychiatric:  Alert and oriented, thought content appropriate, normal insight  Capillary Refill: Brisk,< 3 seconds   Peripheral Pulses: +2 palpable, equal bilaterally       CXR:  I have reviewed the CXR with the following interpretation: No acute ASD   EKG:  I have reviewed the EKG with the following interpretation: No acute ischemic changes     Labs:     Recent Labs     05/02/22  1435 05/03/22  0837   WBC 5.8 7.2   HGB 14.9 14.3   HCT 43.2 42.0    344     Recent Labs     05/02/22  1435      K 3.9      CO2 26   BUN 19   CREATININE 0.8*   CALCIUM 10.8*     Recent Labs     05/02/22  1435   AST 20   ALT 33   BILITOT 3.4*   ALKPHOS 86     Recent Labs     05/02/22  1435   INR 1.02     Recent Labs     05/02/22  1435 05/02/22  1923 05/02/22  2214   [facility] <0.01 <0.01 <0.01       Urinalysis:      Lab Results   Component Value Date    NITRU Negative 03/22/2022    BLOODU Negative 03/22/2022    SPECGRAV >=1.030 03/22/2022    GLUCOSEU Negative 03/22/2022         Consults:    IP CONSULT TO HOSPITALIST  IP CONSULT TO SPIRITUAL SERVICES      ASSESSMENT:    Active Hospital Problems    Diagnosis Date Noted    Obesity [E66.9] 05/02/2022     Priority: Medium    HTN (hypertension) [I10] 05/02/2022     Priority: Medium    Hyperlipidemia [E78.5] 05/02/2022     Priority: Medium    Tobacco abuse [Z72.0] 05/02/2022     Priority: Medium    Chest pain [R07.9] 05/02/2022     Priority: Medium       PLAN:      Chest pain - Concerning to ED for ACS, etiology clinically unable to determine. Initial enzymes/EKG negative. Follow serial enzymes, reviewed and documented as above and monitor on tele, w/out evidence of ischemia/arrythmia. Stress test ordered and pending. HTN - w/out known CAD and no evidence of active signs/sxs of ischemia/failure. Currently controlled on home meds w/ vitals reviewed and documented as above. HyperLipidemia - controlled on home Statin. Continue, w/ f/u and med adjustment w/ PCP    Tobacco Abuse - active and ongoing. Cessation counseled. Nicotine replacement PRN. Obesity -  With Body mass index is 38.09 kg/m². Complicating assessment and treatment. Placing patient at risk for multiple co-morbidities as well as early death and contributing to the patient's presentation. Counseled on weight loss. DVT Prophylaxis: LMWH  Diet: Diet NPO  Code Status: Full Code      PT/OT Eval Status: not yet ordered. Dispo - Patient is likely to remain in-house at least until Tues/Wed 3/4 April pending clinical course. Hiro Ramos MD    Thank you Stephanie Sagastume DO for the opportunity to be involved in this patient's care. If you have any questions or concerns please feel free to contact me at [phone].

## 2022-05-03 NOTE — PLAN OF CARE
Chest pain 0/10. Headache pain 12-7/10 treated with Tylenol and Ice provided, nitro patch removed, Toradol given one time. Pt states headache pain is improving.

## 2022-05-03 NOTE — CARE COORDINATION
CM attempted to see patient this morning. Staff in room. CM attempted to reach pt via hospital room phone. No answer. When calling emergency contact,  - Elizabeth Chatman [phone]. \"number out of service\". Per information at nursing huddle, pt IPTA and will not likely have needs at discharge. CM will attempt to see patient later today as time allows.      Hoang Rosales RN

## 2022-05-03 NOTE — PROGRESS NOTES
A alex myoview stress test was completed on this patient as ordered. The patient tolerated the procedure well. Awaiting stress imaging at this time.

## 2022-05-03 NOTE — FLOWSHEET NOTE
Pt not in room at the time of visit, RN said pt is having stress test at this time. Spiritual will follow up at a later time.

## 2022-05-04 LAB
ALBUMIN SERPL-MCNC: 4.5 G/DL (ref 3.4–5)
ANION GAP SERPL CALCULATED.3IONS-SCNC: 9 MMOL/L (ref 3–16)
BUN BLDV-MCNC: 18 MG/DL (ref 7–20)
CALCIUM SERPL-MCNC: 10.1 MG/DL (ref 8.3–10.6)
CHLORIDE BLD-SCNC: 104 MMOL/L (ref 99–110)
CO2: 27 MMOL/L (ref 21–32)
CREAT SERPL-MCNC: 0.9 MG/DL (ref 0.9–1.3)
GFR AFRICAN AMERICAN: >60
GFR NON-AFRICAN AMERICAN: >60
GLUCOSE BLD-MCNC: 119 MG/DL (ref 70–99)
HCT VFR BLD CALC: 41.1 % (ref 40.5–52.5)
HEMOGLOBIN: 14.1 G/DL (ref 13.5–17.5)
MCH RBC QN AUTO: 29.2 PG (ref 26–34)
MCHC RBC AUTO-ENTMCNC: 34.3 G/DL (ref 31–36)
MCV RBC AUTO: 84.9 FL (ref 80–100)
PDW BLD-RTO: 13.4 % (ref 12.4–15.4)
PHOSPHORUS: 3.7 MG/DL (ref 2.5–4.9)
PLATELET # BLD: 328 K/UL (ref 135–450)
PMV BLD AUTO: 8.1 FL (ref 5–10.5)
POTASSIUM SERPL-SCNC: 3.8 MMOL/L (ref 3.5–5.1)
RBC # BLD: 4.84 M/UL (ref 4.2–5.9)
SODIUM BLD-SCNC: 140 MMOL/L (ref 136–145)
WBC # BLD: 7.3 K/UL (ref 4–11)

## 2022-05-04 PROCEDURE — 2580000003 HC RX 258: Performed by: INTERNAL MEDICINE

## 2022-05-04 PROCEDURE — 36415 COLL VENOUS BLD VENIPUNCTURE: CPT

## 2022-05-04 PROCEDURE — 80069 RENAL FUNCTION PANEL: CPT

## 2022-05-04 PROCEDURE — 85027 COMPLETE CBC AUTOMATED: CPT

## 2022-05-04 PROCEDURE — 99223 1ST HOSP IP/OBS HIGH 75: CPT | Performed by: INTERNAL MEDICINE

## 2022-05-04 PROCEDURE — 6360000002 HC RX W HCPCS: Performed by: INTERNAL MEDICINE

## 2022-05-04 PROCEDURE — 6370000000 HC RX 637 (ALT 250 FOR IP): Performed by: INTERNAL MEDICINE

## 2022-05-04 PROCEDURE — 1200000000 HC SEMI PRIVATE

## 2022-05-04 RX ADMIN — LOSARTAN POTASSIUM 25 MG: 25 TABLET, FILM COATED ORAL at 10:18

## 2022-05-04 RX ADMIN — SODIUM CHLORIDE, PRESERVATIVE FREE 10 ML: 5 INJECTION INTRAVENOUS at 10:19

## 2022-05-04 RX ADMIN — SODIUM CHLORIDE, PRESERVATIVE FREE 10 ML: 5 INJECTION INTRAVENOUS at 21:50

## 2022-05-04 RX ADMIN — ATORVASTATIN CALCIUM 40 MG: 40 TABLET, FILM COATED ORAL at 21:50

## 2022-05-04 RX ADMIN — ASPIRIN 81 MG 81 MG: 81 TABLET ORAL at 10:19

## 2022-05-04 ASSESSMENT — PAIN SCALES - GENERAL
PAINLEVEL_OUTOF10: 0
PAINLEVEL_OUTOF10: 0

## 2022-05-04 NOTE — CONSULTS
[address] [facility]  [phone]      Attending Physician: Wilbert Gamboa MD  Reason for Consultation/Chief Complaint: Chest pain    Subjective   History of Present Illness:  Cecelia Galvez is a 42 y.o. patient who presented to the hospital with complaints of chest pain on the day prior to admission, patient says he developed substernal heaviness with radiation to his left arm with numbness there. He says he did not feel he could take a full breath. He denied nausea or vomiting. He presented to the hospital and was admitted due to concerns for escalating angina, serial troponin levels were drawn and found to be negative. Patient denies any prior cardiac history or cardiac evaluation. He says he has had a history of hypertension, this is been partially controlled on prescribed medical therapy. He additionally has had an anxiety attack before but it was distinct from current presentation. Patient did have a stress test which was abnormal.  Additional evaluation for left arm numbness included MRI of the brain as well as CT head which were negative. Past Medical History:   has a past medical history of Hypertension, Migraine, Pneumonia, and Seizures ([address] Surgical History:   has a past surgical history that includes Knee arthroscopy; Hand surgery; Foot surgery; and hernia repair. Social History:   reports that he has been smoking pipe and cigars. He has never used smokeless tobacco. He reports current alcohol use. He reports that he does not use drugs. Family History:  family history is not on file. Home Medications:  Were reviewed and are listed in nursing record and/or below  Prior to Admission medications    Medication Sig Start Date End Date Taking?  Authorizing Provider   triamcinolone (KENALOG) 0.1 % cream Apply topically 2 times daily 3/31/22  Yes Aleksander Plunkett PA-C   dicyclomine (BENTYL) 10 MG capsule Take 10 mg by mouth 3 times daily as needed 8/27/21  Yes Historical Provider, MD   gabapentin (NEURONTIN) 300 MG capsule Take 300 mg by mouth 3 times daily as needed. 12/3/21  Yes Historical Provider, MD   losartan-hydroCHLOROthiazide (HYZAAR) 100-12.5 MG per tablet Take 1 tablet by mouth daily 9/8/21  Yes Historical Provider, MD   meloxicam (MOBIC) 15 MG tablet Take 15 mg by mouth daily 8/27/21  Yes Historical Provider, MD   hydroCHLOROthiazide (MICROZIDE) 12.5 MG capsule Take 12.5 mg by mouth daily   Yes Historical Provider, MD   HYDROcodone-acetaminophen ([place]) 5-325 MG per tablet  11/5/21  Yes Historical Provider, MD   albuterol sulfate HFA (PROVENTIL HFA) 108 (90 Base) MCG/ACT inhaler Inhale 2 puffs into the lungs every 4 hours as needed for Wheezing or Shortness of Breath (Space out to every 6 hours as symptoms improve) Space out to every 6 hours as symptoms improve. 11/9/21  Yes FLORES Garrison - CNP   ibuprofen (IBU) 800 MG tablet Take 1 tablet by mouth every 8 hours as needed for Pain. 2/18/14  Yes Cristi Bennett PA-C   Lansoprazole (PREVACID PO) Take  by mouth. Yes Historical Provider, MD   ascorbic acid (VITAMIN C) 500 MG tablet Take 1 tablet by mouth 2 times daily for 7 days 1/27/22 2/3/22  JOSE Estrada   zinc sulfate (ZINCATE) 220 (50 Zn) MG capsule Take 1 capsule by mouth daily for 7 days 1/27/22 2/3/22  JOSE Estrada   naproxen (NAPROSYN) 500 MG tablet Take 1 tablet by mouth 2 times daily for 10 days.  9/4/13 9/14/13  Yaneli Cannon MD        CURRENT Medications:  perflutren lipid microspheres (DEFINITY) injection 1.65 mg, ONCE PRN  sodium chloride flush 0.9 % injection 5-40 mL, 2 times per day  sodium chloride flush 0.9 % injection 5-40 mL, PRN  0.9 % sodium chloride infusion, PRN  ondansetron (ZOFRAN-ODT) disintegrating tablet 4 mg, Q8H PRN   Or  ondansetron (ZOFRAN) injection 4 mg, Q6H PRN  acetaminophen (TYLENOL) tablet 650 mg, Q6H PRN   Or  acetaminophen (TYLENOL) suppository 650 mg, Q6H PRN  polyethylene glycol (GLYCOLAX) packet 17 g, Daily PRN  aspirin chewable tablet 81 mg, Daily  atorvastatin (LIPITOR) tablet 40 mg, Nightly  nitroGLYCERIN (NITROSTAT) SL tablet 0.4 mg, Q5 Min PRN  enoxaparin (LOVENOX) injection 40 mg, Daily  losartan (COZAAR) tablet 25 mg, Daily  hydrALAZINE (APRESOLINE) injection 10 mg, Q4H PRN        Allergies:  Azithromycin, Ambien [zolpidem tartrate], Codeine, Lidocaine hcl, Procaine hcl, Aloe, and Tramadol     Review of Systems:   A 14 point review of symptoms completed. Pertinent positives identified in the HPI, all other review of symptoms negative as below.       Objective   PHYSICAL EXAM:    Vitals:    05/04/22 0445   BP: 126/80   Pulse: 92   Resp: 16   Temp: 97.8 °F (36.6 °C)   SpO2: 97%    Weight: 217 lb 12.8 oz (98.8 kg)         General Appearance:  Alert, cooperative, no distress, appears stated age   Head:  Normocephalic, without obvious abnormality, atraumatic   Eyes:  PERRL, conjunctiva/corneas clear   Nose: Nares normal, no drainage or sinus tenderness   Throat: Lips, mucosa, and tongue normal   Neck: Supple, symmetrical, trachea midline, no adenopathy, thyroid: not enlarged, symmetric, no tenderness/mass/nodules, no carotid bruit or JVD   Lungs:   Clear to auscultation bilaterally, respirations unlabored   Chest Wall:  No deformity or tenderness   Heart:  Regular rate and rhythm, S1, S2 normal, no murmur, rub or gallop   Abdomen:   Soft, non-tender, bowel sounds active all four quadrants,  no masses, no organomegaly   Extremities: Extremities normal, atraumatic, no cyanosis or edema   Pulses: 2+ and symmetric   Skin: Skin color, texture, turgor normal, no rashes or lesions   Pysch: Normal mood and affect   Neurologic: Normal gross motor and sensory exam.         Labs   CBC:   Lab Results   Component Value Date    WBC 7.3 05/04/2022    RBC 4.84 05/04/2022    HGB 14.1 05/04/2022    HCT 41.1 05/04/2022    MCV 84.9 05/04/2022    RDW 13.4 05/04/2022     05/04/2022     CMP:  Lab Results Component Value Date     05/04/2022    K 3.8 05/04/2022    K 3.9 05/02/2022     05/04/2022    CO2 27 05/04/2022    BUN 18 05/04/2022    CREATININE 0.9 05/04/2022    GFRAA >60 05/04/2022    AGRATIO 2.0 05/02/2022    LABGLOM >60 05/04/2022    GLUCOSE 119 05/04/2022    PROT 7.8 05/02/2022    CALCIUM 10.1 05/04/2022    BILITOT 3.4 05/02/2022    ALKPHOS 86 05/02/2022    AST 20 05/02/2022    ALT 33 05/02/2022     PT/INR:  No results found for: PTINR  HgBA1c:No results found for: LABA1C  Lab Results   Component Value Date    TROPONINI <0.01 05/02/2022         Cardiac Data     Last EKG:   Normal sinus rhythm versus ectopic atrial rhythm, nonspecific ST changes versus baseline artifact in inferior leads    Echo:    Stress Test:        Summary    Normal LVEF >60%    Normal wall motion    Mixed ischemia/scar in basal inferolateral/apical segments        Overall, this would be considered an abnormal, intermediate to high risk,    study         Cath:    Studies:     cxr       Impression   Clear lungs.             I have reviewed labs and imaging/xray/diagnostic testing in this note. Assessment and Plan          Patient Active Problem List   Diagnosis    Obesity    HTN (hypertension)    Hyperlipidemia    Tobacco abuse    Chest pain       Chest pain, abnl stress test, plan on ht cath, r/b/a/o d/w pt, he understands/agrees, however patient says he cannot do procedure with light to moderate sedation, he needs deep sedation and general anesthesia as he cannot remain still on Cath Lab table and has a high anxiety level. We will try to arrange with general anesthesia, patient understand that this will escalate risks of the procedure but as he has significant abnormalities on stress testing, concerning clinical presentation and strong family history of early CAD, will plan on cardiac catheterization with general anesthesia. This will likely be performed tomorrow on May 5, 2022 with one of my partners.     Hchol, statin    Htn, losartan    DISCUSSION OF CARDIAC CATHETERIZATION PROCEDURES: The procedures, indications, risks and alternatives have been discussed with the patient and, as appropriate, with the patient's guardian . Risks discussed included, but are not limited to, bleeding, development of blood clots/emboli, damage to blood vessels, renal failure, malignant cardiac arrhythmias, stroke, heart attack, emergent coronary bypass surgery, death, dye allergy. The patient (and guardian as appropriate) expressed understanding of the aforementioned and wished to proceed. Thank you for allowing us to participate in the care of Neo Vigil. Please call me with any questions [phone].     Bahman Chen MD, [facility] [place]   Interventional Cardiologist  [facility]  [phone] [address] [facility]  [phone] [address] [place]  5/4/2022 8:36 AM

## 2022-05-04 NOTE — FLOWSHEET NOTE
05/04/22 1021   Vital Signs   Temp 98 °F (36.7 °C)   Temp Source Oral   Pulse 65   Heart Rate Source Radial   Resp 16   /60   MAP (Calculated) 79   Level of Consciousness Alert (0)   MEWS Score 1   Pain Assessment   Pain Assessment None - Denies Pain   Oxygen Therapy   SpO2 98 %   O2 Device None (Room air)   see flowsheet for assessment pt caox3 no needs voiced NPO at this time

## 2022-05-04 NOTE — PLAN OF CARE
Problem: Discharge Planning  Goal: Discharge to home or other facility with appropriate resources  Outcome: Progressing     Problem: Pain  Goal: Verbalizes/displays adequate comfort level or baseline comfort level  Outcome: Progressing     Problem: Safety - Adult  Goal: Free from fall injury  Outcome: Progressing   See flowsheet

## 2022-05-04 NOTE — PROGRESS NOTES
End of shift report to oncoming RN to assume care of pt pt resting quietly no needs voiced at this time

## 2022-05-04 NOTE — CONSULTS
Consult placed    [phone] [facility] cardiology  Date:5/4/2022,  Time:7:43 AM        Electronically signed by Es Rao on 5/4/2022 at 7:43 AM

## 2022-05-04 NOTE — PROGRESS NOTES
Hospitalist Progress Note      PCP: Taylor Clement DO    Date of Admission: 5/2/2022    Chief Complaint: Chest Pain as well as HA w/ L hand weakness    Subjective: no new c/o. Medications:  Reviewed    Infusion Medications    sodium chloride       Scheduled Medications    sodium chloride flush  5-40 mL IntraVENous 2 times per day    aspirin  81 mg Oral Daily    atorvastatin  40 mg Oral Nightly    enoxaparin  40 mg SubCUTAneous Daily    losartan  25 mg Oral Daily     PRN Meds: perflutren lipid microspheres, sodium chloride flush, sodium chloride, ondansetron **OR** ondansetron, acetaminophen **OR** acetaminophen, polyethylene glycol, nitroGLYCERIN, hydrALAZINE    No intake or output data in the 24 hours ending 05/04/22 0852    Physical Exam Performed:    /80   Pulse 92   Temp 97.8 °F (36.6 °C) (Oral)   Resp 16   Ht 5' 3\" (1.6 m)   Wt 217 lb 12.8 oz (98.8 kg)   SpO2 97%   BMI 38.58 kg/m²     General appearance: No apparent distress, appears stated age and cooperative. HEENT: Pupils equal, round, and reactive to light. Conjunctivae/corneas clear. Neck: Supple, with full range of motion. No jugular venous distention. Trachea midline. Respiratory:  Normal respiratory effort. Clear to auscultation, bilaterally without Rales/Wheezes/Rhonchi. Cardiovascular: Regular rate and rhythm with normal S1/S2 without murmurs, rubs or gallops. Abdomen: Soft, non-tender, non-distended with normal bowel sounds. Musculoskeletal: No clubbing, cyanosis or edema bilaterally. Full range of motion without deformity. Skin: Skin color, texture, turgor normal.  No rashes or lesions. Neurologic:  Neurovascularly intact without any focal sensory/motor deficits.  Cranial nerves: II-XII intact, grossly non-focal.  Psychiatric: Alert and oriented, thought content appropriate, normal insight  Capillary Refill: Brisk,< 3 seconds   Peripheral Pulses: +2 palpable, equal bilaterally       Labs:   Recent Labs 05/02/22  1435 05/03/22  0837 05/04/22  0621   WBC 5.8 7.2 7.3   HGB 14.9 14.3 14.1   HCT 43.2 42.0 41.1    344 328     Recent Labs     05/02/22  1435 05/03/22  0837 05/04/22  0621    142 140   K 3.9 3.9 3.8    103 104   CO2 26 28 27   BUN 19 21* 18   CREATININE 0.8* 0.9 0.9   CALCIUM 10.8* 10.5 10.1   PHOS  --  4.2 3.7     Recent Labs     05/02/22  1435   AST 20   ALT 33   BILITOT 3.4*   ALKPHOS 86     Recent Labs     05/02/22  1435   INR 1.02     Recent Labs     05/02/22  1435 05/02/22  1923 05/02/22  2214   TROPONINI <0.01 <0.01 <0.01       Urinalysis:      Lab Results   Component Value Date    NITRU Negative 03/22/2022    BLOODU Negative 03/22/2022    SPECGRAV >=1.030 03/22/2022    GLUCOSEU Negative 03/22/2022       Consults:    IP CONSULT TO HOSPITALIST  IP CONSULT TO SPIRITUAL SERVICES  IP CONSULT TO CARDIOLOGY      Assessment/Plan:    Active Hospital Problems    Diagnosis     Obesity [E66.9]      Priority: Medium    HTN (hypertension) [I10]      Priority: Medium    Hyperlipidemia [E78.5]      Priority: Medium    Tobacco abuse [Z72.0]      Priority: Medium    Chest pain [R07.9]      Priority: Medium         Chest pain - Concerning to ED for ACS, etiology clinically unable to determine. Initial enzymes/EKG negative. Follow serial enzymes, reviewed and documented as above and monitor on tele, w/out evidence of ischemia/arrythmia. Stress test suggestive of reversible ischemia - Cardiology consulted and appreciated w/ plan for [facility].     HTN - w/out known CAD and no evidence of active signs/sxs of ischemia/failure. Currently controlled on home meds w/ vitals reviewed and documented as above.     HyperLipidemia - controlled on home Statin. Continue, w/ f/u and med adjustment w/ PCP     Tobacco Abuse - active and ongoing. Cessation counseled. Nicotine replacement PRN.     Obesity -  With Body mass index is 38.09 kg/m². Complicating assessment and treatment.  Placing patient at risk for multiple co-morbidities as well as early death and contributing to the patient's presentation. Counseled on weight loss.       DVT Prophylaxis: LMWH    Recent Labs     05/02/22  1435 05/03/22  0837 05/04/22  0621    344 328     Diet: Diet NPO  Code Status: Full Code      PT/OT Eval Status: not yet ordered. Dispo - Patient is likely to remain in-house at least until Wed 4 April pending clinical course and subspecialty recs.       Hilario Hamman, MD

## 2022-05-04 NOTE — ACP (ADVANCE CARE PLANNING)
Advance Care Planning     Advance Care Planning Inpatient Note  [facility] Department    Today's Date: 5/4/2022  Unit: [facility] B3 - MED SURG    Received request from IDT Member. Upon review of chart and communication with care team, patient's decision making abilities are not in question. . Patient was/were present in the room during visit. Goals of ACP Conversation:  Discuss advance care planning documents    Health Care Decision Makers:       Primary Decision Maker: Mary Tran - Parent - [phone]    Summary:  Completed New Documents    Advance Care Planning Documents (Patient Wishes):  Healthcare Power of /Advance Directive Appointment of Health Care Agent  Living Will/Advance Directive     Assessment:  Pt is not  has no children, appointed his father as primary decision maker. Talked about his health and relationships. Said, \"I do not want to be put on the machine. If there is nothing that can be done to save me, just let me go. \"    Interventions:  Provided education on documents for clarity and greater understanding  Discussed and provided education on state decision maker hierarchy  Assisted in the completion of documents according to patient's wishes at this time     Care Preferences Communicated:     Hospitalization:  If the patient's health worsens and it becomes clear that the chance of recovery is unlikely,     the patient wants hospitalization. Ventilation:   If the patient, in their present state of health, suddenly became very ill and unable to breathe on their own,     the patient would desire the use of a ventilator (breathing machine). If their health worsens and it becomes clear that the change of recovery is unlikely,     the patient would NOT desire the use of a ventilator (breathing machine).     Resuscitation:  In the event the patient's heart stopped as a result of an underlying serious health condition, the patient communicates a preference for      resuscitative attempts (CPR). Only if there are chances of survival.    Outcomes/Plan:  ACP Discussion: Completed  New advance directive completed. Returned original document(s) to patient, as well as copies for distribution to appointed agents  Copy of advance directive given to staff to scan into medical record.   Teach Back Method used to verify the patient's and/or Healthcare Decision Maker's understanding of key information in the advance directive documents    Electronically signed by Chaplain Erika on 5/4/2022 at 3:01 PM

## 2022-05-04 NOTE — FLOWSHEET NOTE
05/04/22 1515   Vital Signs   Temp 98 °F (36.7 °C)   Temp Source Oral   Pulse 60   Heart Rate Source Monitor   Resp 16   /75   MAP (Calculated) 92.33   Level of Consciousness Alert (0)   MEWS Score 1   Pain Assessment   Pain Assessment None - Denies Pain   Care Plan - Pain Goals   Verbalizes/displays adequate comfort level or baseline comfort level Encourage patient to monitor pain and request assistance   Oxygen Therapy   SpO2 98 %   O2 Device None (Room air)   pt resting in bed no s/s of distress noted eyes closed resp even , pt easily aroused , denies CP or SOB denies needs at this time

## 2022-05-05 ENCOUNTER — ANESTHESIA EVENT (OUTPATIENT)
Dept: CARDIAC CATH/INVASIVE PROCEDURES | Age: 42
DRG: 192 | End: 2022-05-05
Payer: MEDICAID

## 2022-05-05 ENCOUNTER — APPOINTMENT (OUTPATIENT)
Dept: CARDIAC CATH/INVASIVE PROCEDURES | Age: 42
DRG: 192 | End: 2022-05-05
Payer: MEDICAID

## 2022-05-05 ENCOUNTER — ANESTHESIA (OUTPATIENT)
Dept: CARDIAC CATH/INVASIVE PROCEDURES | Age: 42
DRG: 192 | End: 2022-05-05
Payer: MEDICAID

## 2022-05-05 VITALS
HEIGHT: 63 IN | BODY MASS INDEX: 38.41 KG/M2 | TEMPERATURE: 98.2 F | SYSTOLIC BLOOD PRESSURE: 166 MMHG | DIASTOLIC BLOOD PRESSURE: 95 MMHG | WEIGHT: 216.8 LBS | HEART RATE: 82 BPM | RESPIRATION RATE: 18 BRPM | OXYGEN SATURATION: 99 %

## 2022-05-05 VITALS — DIASTOLIC BLOOD PRESSURE: 58 MMHG | SYSTOLIC BLOOD PRESSURE: 109 MMHG | OXYGEN SATURATION: 94 %

## 2022-05-05 LAB
LV EF: 58 %
LVEF MODALITY: NORMAL

## 2022-05-05 PROCEDURE — 6360000002 HC RX W HCPCS: Performed by: NURSE ANESTHETIST, CERTIFIED REGISTERED

## 2022-05-05 PROCEDURE — B2151ZZ FLUOROSCOPY OF LEFT HEART USING LOW OSMOLAR CONTRAST: ICD-10-PCS | Performed by: INTERNAL MEDICINE

## 2022-05-05 PROCEDURE — 3700000001 HC ADD 15 MINUTES (ANESTHESIA)

## 2022-05-05 PROCEDURE — 4A023N7 MEASUREMENT OF CARDIAC SAMPLING AND PRESSURE, LEFT HEART, PERCUTANEOUS APPROACH: ICD-10-PCS | Performed by: INTERNAL MEDICINE

## 2022-05-05 PROCEDURE — B2111ZZ FLUOROSCOPY OF MULTIPLE CORONARY ARTERIES USING LOW OSMOLAR CONTRAST: ICD-10-PCS | Performed by: INTERNAL MEDICINE

## 2022-05-05 PROCEDURE — C1887 CATHETER, GUIDING: HCPCS

## 2022-05-05 PROCEDURE — 93458 L HRT ARTERY/VENTRICLE ANGIO: CPT | Performed by: INTERNAL MEDICINE

## 2022-05-05 PROCEDURE — 2709999900 HC NON-CHARGEABLE SUPPLY

## 2022-05-05 PROCEDURE — 93458 L HRT ARTERY/VENTRICLE ANGIO: CPT

## 2022-05-05 PROCEDURE — C1894 INTRO/SHEATH, NON-LASER: HCPCS

## 2022-05-05 PROCEDURE — 6360000002 HC RX W HCPCS

## 2022-05-05 PROCEDURE — C1769 GUIDE WIRE: HCPCS

## 2022-05-05 PROCEDURE — 3700000000 HC ANESTHESIA ATTENDED CARE

## 2022-05-05 PROCEDURE — 2580000003 HC RX 258: Performed by: INTERNAL MEDICINE

## 2022-05-05 PROCEDURE — 6370000000 HC RX 637 (ALT 250 FOR IP): Performed by: INTERNAL MEDICINE

## 2022-05-05 PROCEDURE — 2500000003 HC RX 250 WO HCPCS

## 2022-05-05 RX ORDER — SODIUM CHLORIDE 0.9 % (FLUSH) 0.9 %
5-40 SYRINGE (ML) INJECTION EVERY 12 HOURS SCHEDULED
Status: DISCONTINUED | OUTPATIENT
Start: 2022-05-05 | End: 2022-05-05 | Stop reason: HOSPADM

## 2022-05-05 RX ORDER — PROPOFOL 10 MG/ML
INJECTION, EMULSION INTRAVENOUS PRN
Status: DISCONTINUED | OUTPATIENT
Start: 2022-05-05 | End: 2022-05-05 | Stop reason: SDUPTHER

## 2022-05-05 RX ORDER — MIDAZOLAM HYDROCHLORIDE 1 MG/ML
INJECTION INTRAMUSCULAR; INTRAVENOUS PRN
Status: DISCONTINUED | OUTPATIENT
Start: 2022-05-05 | End: 2022-05-05 | Stop reason: SDUPTHER

## 2022-05-05 RX ORDER — SODIUM CHLORIDE 0.9 % (FLUSH) 0.9 %
5-40 SYRINGE (ML) INJECTION PRN
Status: DISCONTINUED | OUTPATIENT
Start: 2022-05-05 | End: 2022-05-05 | Stop reason: HOSPADM

## 2022-05-05 RX ORDER — SODIUM CHLORIDE 9 MG/ML
INJECTION, SOLUTION INTRAVENOUS CONTINUOUS
Status: DISCONTINUED | OUTPATIENT
Start: 2022-05-05 | End: 2022-05-05 | Stop reason: HOSPADM

## 2022-05-05 RX ORDER — HYDRALAZINE HYDROCHLORIDE 20 MG/ML
10 INJECTION INTRAMUSCULAR; INTRAVENOUS EVERY 10 MIN PRN
Status: DISCONTINUED | OUTPATIENT
Start: 2022-05-05 | End: 2022-05-05 | Stop reason: HOSPADM

## 2022-05-05 RX ORDER — FENTANYL CITRATE 50 UG/ML
INJECTION, SOLUTION INTRAMUSCULAR; INTRAVENOUS PRN
Status: DISCONTINUED | OUTPATIENT
Start: 2022-05-05 | End: 2022-05-05 | Stop reason: SDUPTHER

## 2022-05-05 RX ORDER — SODIUM CHLORIDE 9 MG/ML
INJECTION, SOLUTION INTRAVENOUS PRN
Status: DISCONTINUED | OUTPATIENT
Start: 2022-05-05 | End: 2022-05-05 | Stop reason: HOSPADM

## 2022-05-05 RX ADMIN — ACETAMINOPHEN 650 MG: 325 TABLET ORAL at 09:01

## 2022-05-05 RX ADMIN — PROPOFOL 20 MG: 10 INJECTION, EMULSION INTRAVENOUS at 08:20

## 2022-05-05 RX ADMIN — PROPOFOL 20 MG: 10 INJECTION, EMULSION INTRAVENOUS at 08:22

## 2022-05-05 RX ADMIN — PROPOFOL 20 MG: 10 INJECTION, EMULSION INTRAVENOUS at 08:33

## 2022-05-05 RX ADMIN — PROPOFOL 20 MG: 10 INJECTION, EMULSION INTRAVENOUS at 08:28

## 2022-05-05 RX ADMIN — SODIUM CHLORIDE: 9 INJECTION, SOLUTION INTRAVENOUS at 10:19

## 2022-05-05 RX ADMIN — LOSARTAN POTASSIUM 25 MG: 25 TABLET, FILM COATED ORAL at 09:01

## 2022-05-05 RX ADMIN — SODIUM CHLORIDE, PRESERVATIVE FREE 10 ML: 5 INJECTION INTRAVENOUS at 09:02

## 2022-05-05 RX ADMIN — PROPOFOL 10 MG: 10 INJECTION, EMULSION INTRAVENOUS at 08:26

## 2022-05-05 RX ADMIN — PROPOFOL 20 MG: 10 INJECTION, EMULSION INTRAVENOUS at 08:24

## 2022-05-05 RX ADMIN — FENTANYL CITRATE 50 MCG: 50 INJECTION INTRAMUSCULAR; INTRAVENOUS at 08:09

## 2022-05-05 RX ADMIN — PROPOFOL 50 MG: 10 INJECTION, EMULSION INTRAVENOUS at 08:09

## 2022-05-05 RX ADMIN — MIDAZOLAM HYDROCHLORIDE 1 MG: 2 INJECTION, SOLUTION INTRAMUSCULAR; INTRAVENOUS at 08:09

## 2022-05-05 RX ADMIN — PROPOFOL 20 MG: 10 INJECTION, EMULSION INTRAVENOUS at 08:16

## 2022-05-05 RX ADMIN — PROPOFOL 20 MG: 10 INJECTION, EMULSION INTRAVENOUS at 08:12

## 2022-05-05 ASSESSMENT — PULMONARY FUNCTION TESTS
PIF_VALUE: 0
PIF_VALUE: 1
PIF_VALUE: 0
PIF_VALUE: 1
PIF_VALUE: 0

## 2022-05-05 ASSESSMENT — PAIN DESCRIPTION - PAIN TYPE: TYPE: ACUTE PAIN

## 2022-05-05 ASSESSMENT — PAIN SCALES - GENERAL
PAINLEVEL_OUTOF10: 8
PAINLEVEL_OUTOF10: 0

## 2022-05-05 ASSESSMENT — PAIN DESCRIPTION - LOCATION: LOCATION: HEAD

## 2022-05-05 ASSESSMENT — PAIN DESCRIPTION - DESCRIPTORS: DESCRIPTORS: ACHING

## 2022-05-05 NOTE — ANESTHESIA POSTPROCEDURE EVALUATION
Department of Anesthesiology  Postprocedure Note    Patient: Urvashi Cuellar  MRN: [id number]  YOB: 1980  Date of evaluation: 5/5/2022  Time:  2:25 PM     Procedure Summary     Date: 05/05/22 Room / Location: [facility] Cardiac Cath Lab    Anesthesia Start: [address] Anesthesia Stop: 2227    Procedure: HEART CATH LEFT Diagnosis:     Scheduled Providers:  Responsible Provider: Teagan Arredondo MD    Anesthesia Type: MAC, general ASA Status: 3          Anesthesia Type: MAC, general    Lester Phase I:      Lester Phase II:      Last vitals: Reviewed and per EMR flowsheets.        Anesthesia Post Evaluation    Comments: Postoperative Anesthesia Note    Name:    Urvashi Cuellar  MRN:      [id number]    Patient Vitals in the past 12 hrs:  05/05/22 1254, BP:(!) 166/95, Temp:98.2 °F (36.8 °C), Temp src:Oral, Pulse:82, Resp:18, SpO2:99 %  05/05/22 1157, BP:(!) 135/90, Temp:98.3 °F (36.8 °C), Temp src:Oral, Pulse:72, Resp:18, SpO2:98 %  05/05/22 1111, SpO2:95 %  05/05/22 1054, SpO2:97 %  05/05/22 1036, SpO2:98 %  05/05/22 1012, BP:(!) 142/90, Pulse:67, Resp:18, SpO2:98 %  05/05/22 0945, BP:(!) 147/85, Pulse:70, Resp:18, SpO2:99 %  05/05/22 0935, BP:130/81, Pulse:72, Resp:18  05/05/22 0906, BP:121/77, Pulse:78, Resp:18, SpO2:97 %  05/05/22 0849, BP:121/75, Temp:97.6 °F (36.4 °C), Temp src:Oral, Pulse:68, Resp:18, SpO2:96 %  05/05/22 0506, Weight:216 lb 12.8 oz (98.3 kg)  05/05/22 0445, BP:120/84, Temp:97.9 °F (36.6 °C), Temp src:Oral, Pulse:86, Resp:16, SpO2:98 %     LABS:    CBC  Lab Results       Component                Value               Date/Time                  WBC                      7.3                 05/04/2022 06:21 AM        HGB                      14.1                05/04/2022 06:21 AM        HCT                      41.1                05/04/2022 06:21 AM        PLT                      328                 05/04/2022 06:21 AM   RENAL  Lab Results       Component                Value               Date/Time NA                       140                 05/04/2022 06:21 AM        K                        3.8                 05/04/2022 06:21 AM        K                        3.9                 05/02/2022 02:35 PM        CL                       104                 05/04/2022 06:21 AM        CO2                      27                  05/04/2022 06:21 AM        BUN                      18                  05/04/2022 06:21 AM        CREATININE               0.9                 05/04/2022 06:21 AM        GLUCOSE                  119 (H)             05/04/2022 06:21 AM   COAGS  Lab Results       Component                Value               Date/Time                  PROTIME                  11.5                05/02/2022 02:35 PM        INR                      1.02                05/02/2022 02:35 PM     Intake & Output:  @99JWLT@    Nausea & Vomiting:  No    Level of Consciousness:  Awake    Pain Assessment:  Adequate analgesia    Anesthesia Complications:  No apparent anesthetic complications    SUMMARY      Vital signs stable  OK to discharge from Stage I post anesthesia care.   Care transferred from Anesthesiology department on discharge from perioperative area

## 2022-05-05 NOTE — PROCEDURES
[address]       Cardiac Catheterization Lab Report    PATIENT: Kristina Clarke  DATE: 2022  MRN: [id number]  CSN: [id number]  : 1980      Performing Physician: Sacha Fine MD, GRICEL, [place], [place]  Primary Care Physician: Matteo Manzano DO  Admitting Provider: Lucy Trivedi MD    Procedures Performed:   1. Left heart catheterization  2. Selective left and right coronary angiogram  3. Left ventriculography     Procedure Findings:  1. Normal coronary angiogram  2. Normal left ventricular function with EF estimated at 55-60%  3. Normal left heart hemodynamics    Indications:   Patient Active Problem List   Diagnosis    Obesity    HTN (hypertension)    Hyperlipidemia    Tobacco abuse    Chest pain       Details:   Kristina Clarke was brought to the cardiac catheterization lab in a fasting state after informed consent was obtained. If the patient was able to provide written consent, it was obtained. The patient's vitals were monitored through out the procedure. The patient was sedated using the appropriate levels of sedation and ASA guidelines. The appropriate access site area was prepped and drapped in a sterile fashion. The area was anesthetized with 2% lidocaine. Using the modified Seldinger technique, an arterial sheath was introduced into the arterial access site using a single anterior wall puncture. The sheath was flushed and prepped in usual fashion. Catheters used during the procedure included 5 Mongolian TIG 4.0 catheter. The catheters were advanced and removed over a .035\" wire, into the appropriate positions. Multiple angiographic views were obtained. An LV gram was obtained. Findings:    1. Left main coronary artery was normal. It gave off the left anterior descending artery and left circumflex. 2. Left anterior descending artery was normal. It was moderate in size. It gave off septal perforators and a moderate sized diagonal branch.  The LAD covered the entire apex of the left ventricle. 3. Left circumflex was normal. It was moderate in size. There was a moderate sized obtuse marginal branch. 4. Right coronary artery was normal. It was moderate in size and was the dominant artery. 5. Left ventriculogram showed normal LVEF at 55-60%. Wall motion was normal . There was no significant mitral valve or aortic valve disease noted. LVEDP was normal. There was no gradient noted across the aortic valve during pullback of the catheter. /84   Pulse 86   Temp 97.9 °F (36.6 °C) (Oral)   Resp 16   Ht 5' 3\" (1.6 m)   Wt 216 lb 12.8 oz (98.3 kg)   SpO2 98%   BMI 38.40 kg/m²     The access site was controlled with manual pressure and/or appropriate closure device. The patient was given deep sedation under the direction of the anesthesia team    EBL - minimal <5 cc blood loss    The patient was monitored continuously with the ECG, pulse oximetry, blood pressure, and direct observation. CONCLUSIONS:    1. Normal coronary angiogram   ~false positive stress test    ASSESSMENT/RECOMMENDATIONS:    1. Risk Factor control  2.  Non-cardiac evaluation      Nissa Ya MD, GRICEL, [place] [address] Cardiology  [facility]  [phone] Main central office  [phone] [place] office  5/5/2022  8:35 AM

## 2022-05-05 NOTE — ANESTHESIA PRE PROCEDURE
Department of Anesthesiology  Preprocedure Note       Name:  Kristina Clarke   Age:  42 y.o.  :  1980                                          MRN:  [id number]         Date:  2022      Surgeon: * No surgeons listed *    Procedure: * No procedures listed *    Medications prior to admission:   Prior to Admission medications    Medication Sig Start Date End Date Taking? Authorizing Provider   triamcinolone (KENALOG) 0.1 % cream Apply topically 2 times daily 3/31/22  Yes Jos Vela PA-C   dicyclomine (BENTYL) 10 MG capsule Take 10 mg by mouth 3 times daily as needed 21  Yes Historical Provider, MD   gabapentin (NEURONTIN) 300 MG capsule Take 300 mg by mouth 3 times daily as needed. 12/3/21  Yes Historical Provider, MD   losartan-hydroCHLOROthiazide (HYZAAR) 100-12.5 MG per tablet Take 1 tablet by mouth daily 21  Yes Historical Provider, MD   meloxicam (MOBIC) 15 MG tablet Take 15 mg by mouth daily 21  Yes Historical Provider, MD   hydroCHLOROthiazide (MICROZIDE) 12.5 MG capsule Take 12.5 mg by mouth daily   Yes Historical Provider, MD   HYDROcodone-acetaminophen (Maxime Kayser) 5-325 MG per tablet  21  Yes Historical Provider, MD   albuterol sulfate HFA (PROVENTIL HFA) 108 (90 Base) MCG/ACT inhaler Inhale 2 puffs into the lungs every 4 hours as needed for Wheezing or Shortness of Breath (Space out to every 6 hours as symptoms improve) Space out to every 6 hours as symptoms improve. 21  Yes ForresFLORES Marin - CNP   ibuprofen (IBU) 800 MG tablet Take 1 tablet by mouth every 8 hours as needed for Pain. 14  Yes Edita Suárez PA-C   Lansoprazole (PREVACID PO) Take  by mouth.    Yes Historical Provider, MD   ascorbic acid (VITAMIN C) 500 MG tablet Take 1 tablet by mouth 2 times daily for 7 days 1/27/22 2/3/22  JOSE Aleman   zinc sulfate (ZINCATE) 220 (50 Zn) MG capsule Take 1 capsule by mouth daily for 7 days 1/27/22 2/3/22  JOSE Aleman   naproxen (NAPROSYN) 500 MG tablet Take 1 tablet by mouth 2 times daily for 10 days. 9/4/13 9/14/13  Bishop Floyd MD       Current medications:    Current Facility-Administered Medications   Medication Dose Route Frequency Provider Last Rate Last Admin    perflutren lipid microspheres (DEFINITY) injection 1.65 mg  1.5 mL IntraVENous ONCE PRN Junior Aleta MD        sodium chloride flush 0.9 % injection 5-40 mL  5-40 mL IntraVENous 2 times per day Akash Scott MD   10 mL at 05/04/22 2150    sodium chloride flush 0.9 % injection 5-40 mL  5-40 mL IntraVENous PRN Akash Scott MD        0.9 % sodium chloride infusion   IntraVENous PRN Akash Scott MD        ondansetron (ZOFRAN-ODT) disintegrating tablet 4 mg  4 mg Oral Q8H PRN Akash Scott MD        Or    ondansetron [facility]) injection 4 mg  4 mg IntraVENous Q6H PRN Akash Scott MD   4 mg at 05/02/22 2151    acetaminophen (TYLENOL) tablet 650 mg  650 mg Oral Q6H PRN Akash Scott MD   650 mg at 05/02/22 1912    Or    acetaminophen (TYLENOL) suppository 650 mg  650 mg Rectal Q6H PRN Akash Scott MD        polyethylene glycol [place] [facility]) packet 17 g  17 g Oral Daily PRN Akash Scott MD        aspirin chewable tablet 81 mg  81 mg Oral Daily Akash Scott MD   81 mg at 05/04/22 1019    atorvastatin (LIPITOR) tablet 40 mg  40 mg Oral Nightly Akash Scott MD   40 mg at 05/04/22 2150    nitroGLYCERIN (NITROSTAT) SL tablet 0.4 mg  0.4 mg SubLINGual Q5 Min PRN Akash Scott MD        enoxaparin (LOVENOX) injection 40 mg  40 mg SubCUTAneous Daily Akash Scott MD   40 mg at 05/02/22 1930    losartan (COZAAR) tablet 25 mg  25 mg Oral Daily Akash Scott MD   25 mg at 05/04/22 1018    hydrALAZINE (APRESOLINE) injection 10 mg  10 mg IntraVENous Q4H PRN Akash Scott MD           Allergies:     Allergies   Allergen Reactions    Azithromycin Nausea And Vomiting    Ambien [Zolpidem Tartrate]     Codeine Other (See Comments)     dizziness    Lidocaine Hcl     Procaine Hcl     Aloe Itching    Tramadol      Other reaction(s): Other (See Comments), Other (See Comments)  Nightmares   Nightmares          Problem List:    Patient Active Problem List   Diagnosis Code    Obesity E66.9    HTN (hypertension) I10    Hyperlipidemia E78.5    Tobacco abuse Z72.0    Chest pain R07.9       Past Medical History:        Diagnosis Date    Hypertension     Migraine     Pneumonia     Seizures ([address]     temporal seizures as a kid       Past Surgical History:        Procedure Laterality Date    FOOT SURGERY      HAND SURGERY      HERNIA REPAIR      KNEE ARTHROSCOPY         Social History:    Social History     Tobacco Use    Smoking status: Current Some Day Smoker     Types: Pipe, Cigars    Smokeless tobacco: Never Used   Substance Use Topics    Alcohol use: Yes     Alcohol/week: 0.0 standard drinks     Comment: occasional                                Ready to quit: Not Answered  Counseling given: Not Answered      Vital Signs (Current):   Vitals:    05/04/22 1515 05/05/22 0110 05/05/22 0445 05/05/22 0506   BP: 127/75 130/78 120/84    Pulse: 60 83 86    Resp: 16 16 16    Temp: 98 °F (36.7 °C) 98.4 °F (36.9 °C) 97.9 °F (36.6 °C)    TempSrc: Oral Oral Oral    SpO2: 98% 94% 98%    Weight:    216 lb 12.8 oz (98.3 kg)   Height:                                                  BP Readings from Last 3 Encounters:   05/05/22 120/84   03/31/22 (!) 145/100   03/22/22 135/89       NPO Status:                                                                                 BMI:   Wt Readings from Last 3 Encounters:   05/05/22 216 lb 12.8 oz (98.3 kg)   03/31/22 225 lb (102.1 kg)   03/22/22 220 lb (99.8 kg)     Body mass index is 38.4 kg/m².     CBC:   Lab Results   Component Value Date    WBC 7.3 05/04/2022    RBC 4.84 05/04/2022    HGB 14.1 05/04/2022    HCT 41.1 05/04/2022    MCV 84.9 05/04/2022    RDW 13.4 05/04/2022     05/04/2022       CMP:   Lab Results Component Value Date     05/04/2022    K 3.8 05/04/2022    K 3.9 05/02/2022     05/04/2022    CO2 27 05/04/2022    BUN 18 05/04/2022    CREATININE 0.9 05/04/2022    GFRAA >60 05/04/2022    AGRATIO 2.0 05/02/2022    LABGLOM >60 05/04/2022    GLUCOSE 119 05/04/2022    PROT 7.8 05/02/2022    CALCIUM 10.1 05/04/2022    BILITOT 3.4 05/02/2022    ALKPHOS 86 05/02/2022    AST 20 05/02/2022    ALT 33 05/02/2022       POC Tests: No results for input(s): POCGLU, POCNA, POCK, POCCL, POCBUN, POCHEMO, POCHCT in the last 72 hours. Coags:   Lab Results   Component Value Date    PROTIME 11.5 05/02/2022    INR 1.02 05/02/2022       HCG (If Applicable): No results found for: PREGTESTUR, PREGSERUM, HCG, HCGQUANT     ABGs: No results found for: PHART, PO2ART, QHE1LPN, LOU6CLU, BEART, O4MNWYEV     Type & Screen (If Applicable):  No results found for: LABABO, LABRH    Drug/Infectious Status (If Applicable):  No results found for: HIV, HEPCAB    COVID-19 Screening (If Applicable):   Lab Results   Component Value Date    COVID19 DETECTED 01/27/2022    COVID19 NOT DETECTED 12/17/2021           Anesthesia Evaluation  Patient summary reviewed no history of anesthetic complications:   Airway: Mallampati: II  TM distance: >3 FB   Neck ROM: full  Mouth opening: > = 3 FB Dental: normal exam         Pulmonary:Negative Pulmonary ROS and normal exam  breath sounds clear to auscultation  (+) sleep apnea:      (-) COPD and asthma                           Cardiovascular:Negative CV ROS  Exercise tolerance: good (>4 METS),   (+) hypertension:, CAD:,         Rhythm: regular  Rate: normal                    Neuro/Psych:   Negative Neuro/Psych ROS  (+) seizures (childhood):, headaches:,    (-) CVA           GI/Hepatic/Renal: Neg GI/Hepatic/Renal ROS  (+) GERD:, liver disease:,      (-) no renal disease       Endo/Other:        (-) diabetes mellitus               Abdominal:             Vascular: negative vascular ROS.          Other Findings:          Pre-Operative Diagnosis: Chest pain [R07.9]; Left hand weakness [R29.898]; Uncontrolled hypertension [I10]; Weakness of left lower extremity [R29.898]; Chest pain, unspecified type [R07.9]    42 y.o.   BMI:  Body mass index is 38.4 kg/m². Vitals:    05/04/22 1515 05/05/22 0110 05/05/22 0445 05/05/22 0506   BP: 127/75 130/78 120/84    Pulse: 60 83 86    Resp: 16 16 16    Temp: 98 °F (36.7 °C) 98.4 °F (36.9 °C) 97.9 °F (36.6 °C)    TempSrc: Oral Oral Oral    SpO2: 98% 94% 98%    Weight:    216 lb 12.8 oz (98.3 kg)   Height:           Allergies   Allergen Reactions    Azithromycin Nausea And Vomiting    Ambien [Zolpidem Tartrate]     Codeine Other (See Comments)     dizziness    Lidocaine Hcl     Procaine Hcl     Aloe Itching    Tramadol      Other reaction(s): Other (See Comments), Other (See Comments)  Nightmares   Nightmares          Social History     Tobacco Use    Smoking status: Current Some Day Smoker     Types: Pipe, Cigars    Smokeless tobacco: Never Used   Substance Use Topics    Alcohol use:  Yes     Alcohol/week: 0.0 standard drinks     Comment: occasional       LABS:    CBC  Lab Results   Component Value Date/Time    WBC 7.3 05/04/2022 06:21 AM    HGB 14.1 05/04/2022 06:21 AM    HCT 41.1 05/04/2022 06:21 AM     05/04/2022 06:21 AM     RENAL  Lab Results   Component Value Date/Time     05/04/2022 06:21 AM    K 3.8 05/04/2022 06:21 AM    K 3.9 05/02/2022 02:35 PM     05/04/2022 06:21 AM    CO2 27 05/04/2022 06:21 AM    BUN 18 05/04/2022 06:21 AM    CREATININE 0.9 05/04/2022 06:21 AM    GLUCOSE 119 (H) 05/04/2022 06:21 AM     COAGS  Lab Results   Component Value Date/Time    PROTIME 11.5 05/02/2022 02:35 PM    INR 1.02 05/02/2022 02:35 PM          Anesthesia Plan      MAC and general     ASA 3         arterial line                        Zeenat Olivares MD   5/5/2022

## 2022-05-05 NOTE — PROGRESS NOTES
Discharge paperwork provided and reviewed with patient. All questions answered; return to work note provided. Saline lock and heart monitor removed. Pt wheeled out with PCA.

## 2022-05-05 NOTE — POST SEDATION
Patient:  Marcelino Brenner   :   1980    A pre-sedation re-evaluation was performed immediately at the end of the procedure. Procedure:  Cardiac cath  Medications: Procedural sedation with deep sedation  Complications: None  Estimated Blood Loss: none  Specimens: Were not obtained        Ольга Medication and Procedural Reconciliation:  I agree that the documented medications and procedures performed are true. The medications were given under my order. The procedures were performed under my direct supervision.

## 2022-05-05 NOTE — PROGRESS NOTES
Hospitalist Progress Note      PCP: Selene Schofield DO    Date of Admission: 5/2/2022    Chief Complaint: Chest Pain as well as HA w/ L hand weakness    Subjective: no new c/o. Medications:  Reviewed    Infusion Medications    sodium chloride       Scheduled Medications    sodium chloride flush  5-40 mL IntraVENous 2 times per day    aspirin  81 mg Oral Daily    atorvastatin  40 mg Oral Nightly    enoxaparin  40 mg SubCUTAneous Daily    losartan  25 mg Oral Daily     PRN Meds: perflutren lipid microspheres, sodium chloride flush, sodium chloride, ondansetron **OR** ondansetron, acetaminophen **OR** acetaminophen, polyethylene glycol, nitroGLYCERIN, hydrALAZINE    No intake or output data in the 24 hours ending 05/05/22 0903    Physical Exam Performed:    /75   Pulse 68   Temp 97.6 °F (36.4 °C) (Oral)   Resp 18   Ht 5' 3\" (1.6 m)   Wt 216 lb 12.8 oz (98.3 kg)   SpO2 96%   BMI 38.40 kg/m²     General appearance: No apparent distress, appears stated age and cooperative. HEENT: Pupils equal, round, and reactive to light. Conjunctivae/corneas clear. Neck: Supple, with full range of motion. No jugular venous distention. Trachea midline. Respiratory:  Normal respiratory effort. Clear to auscultation, bilaterally without Rales/Wheezes/Rhonchi. Cardiovascular: Regular rate and rhythm with normal S1/S2 without murmurs, rubs or gallops. Abdomen: Soft, non-tender, non-distended with normal bowel sounds. Musculoskeletal: No clubbing, cyanosis or edema bilaterally. Full range of motion without deformity. Skin: Skin color, texture, turgor normal.  No rashes or lesions. Neurologic:  Neurovascularly intact without any focal sensory/motor deficits.  Cranial nerves: II-XII intact, grossly non-focal.  Psychiatric: Alert and oriented, thought content appropriate, normal insight  Capillary Refill: Brisk,< 3 seconds   Peripheral Pulses: +2 palpable, equal bilaterally       Labs:   Recent Labs 05/02/22  1435 05/03/22  0837 05/04/22  0621   WBC 5.8 7.2 7.3   HGB 14.9 14.3 14.1   HCT 43.2 42.0 41.1    344 328     Recent Labs     05/02/22  1435 05/03/22  0837 05/04/22  0621    142 140   K 3.9 3.9 3.8    103 104   CO2 26 28 27   BUN 19 21* 18   CREATININE 0.8* 0.9 0.9   CALCIUM 10.8* 10.5 10.1   PHOS  --  4.2 3.7     Recent Labs     05/02/22  1435   AST 20   ALT 33   BILITOT 3.4*   ALKPHOS 86     Recent Labs     05/02/22  1435   INR 1.02     Recent Labs     05/02/22  1435 05/02/22  1923 05/02/22  2214   TROPONINI <0.01 <0.01 <0.01       Urinalysis:      Lab Results   Component Value Date    NITRU Negative 03/22/2022    BLOODU Negative 03/22/2022    SPECGRAV >=1.030 03/22/2022    GLUCOSEU Negative 03/22/2022       Consults:    IP CONSULT TO HOSPITALIST  IP CONSULT TO SPIRITUAL SERVICES  IP CONSULT TO CARDIOLOGY      Assessment/Plan:    Active Hospital Problems    Diagnosis     Obesity [E66.9]      Priority: Medium    HTN (hypertension) [I10]      Priority: Medium    Hyperlipidemia [E78.5]      Priority: Medium    Tobacco abuse [Z72.0]      Priority: Medium    Chest pain [R07.9]      Priority: Medium         Chest pain - Concerning to ED for ACS, etiology clinically unable to determine. Initial enzymes/EKG negative. Follow serial enzymes, reviewed and documented as above and monitor on tele, w/out evidence of ischemia/arrythmia. Stress test suggestive of reversible ischemia - Cardiology consulted and appreciated s/p LHCat 5 May w/out evidence of obstructive CAD.      HTN - w/out known CAD and no evidence of active signs/sxs of ischemia/failure. Currently controlled on home meds w/ vitals reviewed and documented as above.     HyperLipidemia - controlled on home Statin. Continue, w/ f/u and med adjustment w/ PCP     Tobacco Abuse - active and ongoing. Cessation counseled. Nicotine replacement PRN.     Obesity -  With Body mass index is 38.09 kg/m².  Complicating assessment and treatment. Placing patient at risk for multiple co-morbidities as well as early death and contributing to the patient's presentation. Counseled on weight loss.       DVT Prophylaxis: LMWH    Recent Labs     05/02/22  1435 05/03/22  0837 05/04/22  0621    344 328     Diet: ADULT DIET; Regular  Code Status: Full Code      PT/OT Eval Status: not yet ordered. Dispo - Patient is likely to remain in-house at least until Thurs 5 May pending clinical course and subspecialty recs.       Johnny Jacobs MD

## 2022-05-05 NOTE — PRE SEDATION
Brief Pre-Op Note/Sedation Assessment      Dennie Murray  1980  [id number]  7:40 AM    Planned Procedure: Cardiac Catheterization Procedure  Post Procedure Plan: Return to same level of care  Consent: I have discussed with the patient and/or the patient representative the indication, alternatives, and the possible risks and/or complications of the planned procedure and the anesthesia methods. The patient and/or patient representative appear to understand and agree to proceed. Chief Complaint:   Chest Pain/Pressure      Indications for Cath Procedure:  1. Presentation:  ACS > 24 hrs and New Onset Angina <= 2 months  2. Anginal Classification within 2 weeks:  CCS III - Symptoms with everyday living activities, i.e., moderate limitation  3. Angina Symptoms Assessment:  Typical Chest Pain  4. Heart Failure Class within last 2 weeks:  No symptoms  5. Cardiovascular Instability:  Yes:  Persistent ischemic symptoms (CP, ST Elevation)    Prior Ischemic Workup/Eval:  1. Pre-Procedural Medications: Yes: ACE/ARB/ARNI, Aspirin, Ca Channel Blockers and STATIN  2. Stress Test Completed? Yes:  Stress or Imaging Studies Performed (within ANY time period):   Type:  Stress Nuclear  Results:  Positive:  Myocardial Perfusion Defects (Nuclear) Extent of Ischemia:  High Risk (>3% annual death or MI)    Does Patient need surgery? Cath Valve Surgery:  No    Pre-Procedure Medical History:  Vital Signs:  /84   Pulse 86   Temp 97.9 °F (36.6 °C) (Oral)   Resp 16   Ht 5' 3\" (1.6 m)   Wt 216 lb 12.8 oz (98.3 kg)   SpO2 98%   BMI 38.40 kg/m²     Allergies: Allergies   Allergen Reactions    Azithromycin Nausea And Vomiting    Ambien [Zolpidem Tartrate]     Codeine Other (See Comments)     dizziness    Lidocaine Hcl     Procaine Hcl     Aloe Itching    Tramadol      Other reaction(s):  Other (See Comments), Other (See Comments)  Nightmares   Nightmares        Medications:    Current Facility-Administered Medications   Medication Dose Route Frequency Provider Last Rate Last Admin    perflutren lipid microspheres (DEFINITY) injection 1.65 mg  1.5 mL IntraVENous ONCE PRN Gabino Chatman MD        sodium chloride flush 0.9 % injection 5-40 mL  5-40 mL IntraVENous 2 times per day Kell Yu MD   10 mL at 05/04/22 2150    sodium chloride flush 0.9 % injection 5-40 mL  5-40 mL IntraVENous PRN Kell Yu MD        0.9 % sodium chloride infusion   IntraVENous PRN Kell Yu MD        ondansetron (ZOFRAN-ODT) disintegrating tablet 4 mg  4 mg Oral Q8H PRN Kell Yu MD        Or    ondansetron [facility]) injection 4 mg  4 mg IntraVENous Q6H PRN Kell Yu MD   4 mg at 05/02/22 2151    acetaminophen (TYLENOL) tablet 650 mg  650 mg Oral Q6H PRN Kell Yu MD   650 mg at 05/02/22 1912    Or    acetaminophen (TYLENOL) suppository 650 mg  650 mg Rectal Q6H PRN Kell Yu MD        polyethylene glycol [facility]) packet 17 g  17 g Oral Daily PRN Kell Yu MD        aspirin chewable tablet 81 mg  81 mg Oral Daily Kell Yu MD   81 mg at 05/04/22 1019    atorvastatin (LIPITOR) tablet 40 mg  40 mg Oral Nightly Kell Yu MD   40 mg at 05/04/22 2150    nitroGLYCERIN (NITROSTAT) SL tablet 0.4 mg  0.4 mg SubLINGual Q5 Min PRN Kell Yu MD        enoxaparin (LOVENOX) injection 40 mg  40 mg SubCUTAneous Daily Kell Yu MD   40 mg at 05/02/22 1930    losartan (COZAAR) tablet 25 mg  25 mg Oral Daily Kell Yu MD   25 mg at 05/04/22 1018    hydrALAZINE (APRESOLINE) injection 10 mg  10 mg IntraVENous Q4H PRN Kell Yu MD           Past Medical History:    Past Medical History:   Diagnosis Date    Hypertension     Migraine     Pneumonia     Seizures ([address]     temporal seizures as a kid       Surgical History:    Past Surgical History:   Procedure Laterality Date    FOOT SURGERY      HAND SURGERY      HERNIA REPAIR      KNEE ARTHROSCOPY               Pre-Sedation:  Pre-Sedation Documentation and Exam:  I have assessed the patient and reviewed the H&P on the chart. Prior History of Anesthesia Complications:   none    Modified Mallampati:  II (soft palate, uvula, fauces visible)    ASA Classification:  Class 3 - A patient with severe systemic disease that limits activity but is not incapacitating    Lester Scale: Activity:  2 - Able to move 4 extremities voluntarily on command  Respiration:  2 - Able to breathe deeply and cough freely  Circulation:  2 - BP+/- 20mmHg of normal  Consciousness:  2 - Fully awake  Oxygen Saturation (color):  2 - Able to maintain oxygen saturation >92% on room air    Sedation/Anesthesia Plan:  Guard the patient's safety and welfare. Minimize physical discomfort and pain. Minimize negative psychological responses to treatment by providing sedation and analgesia and maximize the potential amnesia. Patient to meet pre-procedure discharge plan.     Medication Planned:  Deep sedation    Patient is an appropriate candidate for plan of sedation:   yes      Electronically signed by Theresa Munguia MD on 5/5/2022 at 7:40 AM

## 2022-05-05 NOTE — CARE COORDINATION
CASE MANAGEMENT DISCHARGE SUMMARY      Discharge to: home       IMM given: (date) NA         Transportation:    Family/car:      Confirmed discharge plan with:     Patient: yes      Family:  no    Pt to notify         RN, name: Lo    Note: Discharging nurse to complete DENISSE, reconcile AVS, and place final copy with patient's discharge packet. RN to ensure that written prescriptions for  Level II medications are sent with patient to the facility as per protocol.

## 2022-05-06 NOTE — DISCHARGE SUMMARY
Hospital Medicine Discharge Summary    Patient ID: Arleth Fairchild      Patient's PCP: Lanre Teran DO    Admit Date: 5/2/2022     Discharge Date: 5/5/2022      Admitting Physician: Beryle Chandler, MD     Discharge Physician: Beryle Chandler, MD     Discharge Diagnoses: Active Hospital Problems    Diagnosis     Obesity [E66.9]      Priority: Medium    HTN (hypertension) [I10]      Priority: Medium    Hyperlipidemia [E78.5]      Priority: Medium    Tobacco abuse [Z72.0]      Priority: Medium    Chest pain [R07.9]      Priority: Medium       The patient was seen and examined on day of discharge and this discharge summary is in conjunction with any daily progress note from day of discharge. Hospital Course:        Chest pain - Concerning to ED for ACS but ruled out, etiology clinically unable to determine but likely 2nd to HTN.  Initial enzymes/EKG negative.  Followed serial enzymes and monitored on tele, w/out evidence of ischemia/arrythmia.  Stress test suggestive of reversible ischemia - Cardiology consulted and appreciated s/p Cat 5 May w/out evidence of obstructive CAD.      HTN - w/out known CAD and no evidence of active signs/sxs of ischemia/failure. Currently controlled on home meds      HyperLipidemia - controlled on home Statin. Continue, w/ f/u and med adjustment w/ PCP     Tobacco Abuse - active and ongoing. [facility] counseled.  Nicotine replacement PRN.     Obesity -  With Body mass index is 29.20 kg/m². Complicating assessment and treatment. Placing patient at risk for multiple co-morbidities as well as early death and contributing to the patient's presentation. Counseled on weight loss.       Labs:  For convenience and continuity at follow-up the following most recent labs are provided:      CBC:    Lab Results   Component Value Date    WBC 7.3 05/04/2022    HGB 14.1 05/04/2022    HCT 41.1 05/04/2022     05/04/2022       Renal:    Lab Results   Component Value Date  05/04/2022    K 3.8 05/04/2022    K 3.9 05/02/2022     05/04/2022    CO2 27 05/04/2022    BUN 18 05/04/2022    CREATININE 0.9 05/04/2022    CALCIUM 10.1 05/04/2022    PHOS 3.7 05/04/2022         Significant Diagnostic Studies    Radiology:   NM Cardiac Stress Test Nuclear Imaging   Final Result      MRI BRAIN WO CONTRAST   Final Result   1. No acute intracranial abnormality. 2. Cerebellar tonsillar ectopia without karlie Chiari malformation. CT HEAD WO CONTRAST   Final Result   No acute intracranial abnormality. XR CHEST PORTABLE   Final Result   Clear lungs. Consults:     IP CONSULT TO HOSPITALIST  IP CONSULT TO SPIRITUAL SERVICES  IP CONSULT TO CARDIOLOGY    Disposition: Home     Condition at Discharge: Stable    Discharge Instructions/Follow-up:  w/ PCP 1-2 weeks and subspecialists as arranged. Code Status:  Full Code    Activity: activity as tolerated    Diet: regular diet      Discharge Medications:     Discharge Medication List as of 5/5/2022 12:58 PM           Details   triamcinolone (KENALOG) 0.1 % cream Apply topically 2 times daily, Topical, 2 TIMES DAILY Starting u 3/31/2022, Disp-60 g, R-0, Normal      ascorbic acid (VITAMIN C) 500 MG tablet Take 1 tablet by mouth 2 times daily for 7 days, Disp-14 tablet, R-0Normal      zinc sulfate (ZINCATE) 220 (50 Zn) MG capsule Take 1 capsule by mouth daily for 7 days, Disp-7 capsule, R-0Normal      dicyclomine (BENTYL) 10 MG capsule Take 10 mg by mouth 3 times daily as neededHistorical Med      gabapentin (NEURONTIN) 300 MG capsule Take 300 mg by mouth 3 times daily as needed. Historical Med      losartan-hydroCHLOROthiazide (HYZAAR) 100-12.5 MG per tablet Take 1 tablet by mouth dailyHistorical Med      meloxicam (MOBIC) 15 MG tablet Take 15 mg by mouth dailyHistorical Med      hydroCHLOROthiazide (MICROZIDE) 12.5 MG capsule Take 12.5 mg by mouth dailyHistorical Med      HYDROcodone-acetaminophen (NORCO) 5-325 MG per tablet Historical Med      albuterol sulfate HFA (PROVENTIL HFA) 108 (90 Base) MCG/ACT inhaler Inhale 2 puffs into the lungs every 4 hours as needed for Wheezing or Shortness of Breath (Space out to every 6 hours as symptoms improve) Space out to every 6 hours as symptoms improve., Disp-1 each, R-0Print      ibuprofen (IBU) 800 MG tablet Take 1 tablet by mouth every 8 hours as needed for Pain., Disp-20 tablet, R-0      naproxen (NAPROSYN) 500 MG tablet Take 1 tablet by mouth 2 times daily for 10 days. , Disp-20 tablet, R-0      Lansoprazole (PREVACID PO) Take  by mouth. Time Spent on discharge is more than 30 minutes in the examination, evaluation, counseling and review of medications and discharge plan. Signed:    Alysa Patton MD   5/6/2022      Thank you Min Ray DO for the opportunity to be involved in this patient's care. If you have any questions or concerns please feel free to contact me at [phone].

## 2022-05-12 ENCOUNTER — APPOINTMENT (OUTPATIENT)
Dept: GENERAL RADIOLOGY | Age: 42
End: 2022-05-12
Payer: MEDICAID

## 2022-05-12 ENCOUNTER — HOSPITAL ENCOUNTER (EMERGENCY)
Age: 42
Discharge: HOME | End: 2022-05-12
Payer: MEDICAID

## 2022-05-12 VITALS
DIASTOLIC BLOOD PRESSURE: 93 MMHG | HEART RATE: 86 BPM | BODY MASS INDEX: 38.45 KG/M2 | HEIGHT: 63 IN | TEMPERATURE: 97.9 F | SYSTOLIC BLOOD PRESSURE: 148 MMHG | RESPIRATION RATE: 19 BRPM | WEIGHT: 217 LBS | OXYGEN SATURATION: 95 %

## 2022-05-12 DIAGNOSIS — R07.9 CHEST PAIN, UNSPECIFIED TYPE: Primary | ICD-10-CM

## 2022-05-12 LAB
A/G RATIO: 2.1 (ref 1.1–2.2)
ALBUMIN SERPL-MCNC: 4.9 G/DL (ref 3.4–5)
ALP BLD-CCNC: 89 U/L (ref 40–129)
ALT SERPL-CCNC: 179 U/L (ref 10–40)
ANION GAP SERPL CALCULATED.3IONS-SCNC: 13 MMOL/L (ref 3–16)
AST SERPL-CCNC: 112 U/L (ref 15–37)
BASOPHILS ABSOLUTE: 0.1 K/UL (ref 0–0.2)
BASOPHILS RELATIVE PERCENT: 1.3 %
BILIRUB SERPL-MCNC: 0.7 MG/DL (ref 0–1)
BUN BLDV-MCNC: 15 MG/DL (ref 7–20)
CALCIUM SERPL-MCNC: 9.5 MG/DL (ref 8.3–10.6)
CHLORIDE BLD-SCNC: 101 MMOL/L (ref 99–110)
CO2: 20 MMOL/L (ref 21–32)
CREAT SERPL-MCNC: 0.7 MG/DL (ref 0.9–1.3)
EKG ATRIAL RATE: 68 BPM
EKG DIAGNOSIS: NORMAL
EKG P AXIS: 23 DEGREES
EKG P-R INTERVAL: 150 MS
EKG Q-T INTERVAL: 382 MS
EKG QRS DURATION: 94 MS
EKG QTC CALCULATION (BAZETT): 406 MS
EKG R AXIS: 6 DEGREES
EKG T AXIS: 16 DEGREES
EKG VENTRICULAR RATE: 68 BPM
EOSINOPHILS ABSOLUTE: 0.2 K/UL (ref 0–0.6)
EOSINOPHILS RELATIVE PERCENT: 4.2 %
GFR AFRICAN AMERICAN: >60
GFR NON-AFRICAN AMERICAN: >60
GLUCOSE BLD-MCNC: 125 MG/DL (ref 70–99)
HCT VFR BLD CALC: 43.6 % (ref 40.5–52.5)
HEMOGLOBIN: 14.7 G/DL (ref 13.5–17.5)
LYMPHOCYTES ABSOLUTE: 1.6 K/UL (ref 1–5.1)
LYMPHOCYTES RELATIVE PERCENT: 28.9 %
MCH RBC QN AUTO: 28.7 PG (ref 26–34)
MCHC RBC AUTO-ENTMCNC: 33.7 G/DL (ref 31–36)
MCV RBC AUTO: 85.4 FL (ref 80–100)
MONOCYTES ABSOLUTE: 0.4 K/UL (ref 0–1.3)
MONOCYTES RELATIVE PERCENT: 6.4 %
NEUTROPHILS ABSOLUTE: 3.3 K/UL (ref 1.7–7.7)
NEUTROPHILS RELATIVE PERCENT: 59.2 %
PDW BLD-RTO: 13.3 % (ref 12.4–15.4)
PLATELET # BLD: 324 K/UL (ref 135–450)
PMV BLD AUTO: 8 FL (ref 5–10.5)
POTASSIUM SERPL-SCNC: 4.4 MMOL/L (ref 3.5–5.1)
RBC # BLD: 5.1 M/UL (ref 4.2–5.9)
SODIUM BLD-SCNC: 134 MMOL/L (ref 136–145)
TOTAL PROTEIN: 7.2 G/DL (ref 6.4–8.2)
TROPONIN: <0.01 NG/ML
TROPONIN: <0.01 NG/ML
WBC # BLD: 5.6 K/UL (ref 4–11)

## 2022-05-12 PROCEDURE — 99285 EMERGENCY DEPT VISIT HI MDM: CPT

## 2022-05-12 PROCEDURE — 85025 COMPLETE CBC W/AUTO DIFF WBC: CPT

## 2022-05-12 PROCEDURE — 93005 ELECTROCARDIOGRAM TRACING: CPT | Performed by: EMERGENCY MEDICINE

## 2022-05-12 PROCEDURE — 96375 TX/PRO/DX INJ NEW DRUG ADDON: CPT

## 2022-05-12 PROCEDURE — 80053 COMPREHEN METABOLIC PANEL: CPT

## 2022-05-12 PROCEDURE — 93010 ELECTROCARDIOGRAM REPORT: CPT | Performed by: INTERNAL MEDICINE

## 2022-05-12 PROCEDURE — 96374 THER/PROPH/DIAG INJ IV PUSH: CPT

## 2022-05-12 PROCEDURE — 71045 X-RAY EXAM CHEST 1 VIEW: CPT

## 2022-05-12 PROCEDURE — 84484 ASSAY OF TROPONIN QUANT: CPT

## 2022-05-12 PROCEDURE — 6370000000 HC RX 637 (ALT 250 FOR IP): Performed by: NURSE PRACTITIONER

## 2022-05-12 PROCEDURE — 6360000002 HC RX W HCPCS: Performed by: NURSE PRACTITIONER

## 2022-05-12 RX ORDER — DEXAMETHASONE SODIUM PHOSPHATE 10 MG/ML
10 INJECTION INTRAMUSCULAR; INTRAVENOUS ONCE
Status: COMPLETED | OUTPATIENT
Start: 2022-05-12 | End: 2022-05-12

## 2022-05-12 RX ORDER — IPRATROPIUM BROMIDE AND ALBUTEROL SULFATE 2.5; .5 MG/3ML; MG/3ML
1 SOLUTION RESPIRATORY (INHALATION) ONCE
Status: COMPLETED | OUTPATIENT
Start: 2022-05-12 | End: 2022-05-12

## 2022-05-12 RX ORDER — KETOROLAC TROMETHAMINE 30 MG/ML
30 INJECTION, SOLUTION INTRAMUSCULAR; INTRAVENOUS ONCE
Status: COMPLETED | OUTPATIENT
Start: 2022-05-12 | End: 2022-05-12

## 2022-05-12 RX ADMIN — KETOROLAC TROMETHAMINE 30 MG: 30 INJECTION, SOLUTION INTRAMUSCULAR at 12:46

## 2022-05-12 RX ADMIN — DEXAMETHASONE SODIUM PHOSPHATE 10 MG: 10 INJECTION INTRAMUSCULAR; INTRAVENOUS at 11:55

## 2022-05-12 RX ADMIN — IPRATROPIUM BROMIDE AND ALBUTEROL SULFATE 1 AMPULE: .5; 3 SOLUTION RESPIRATORY (INHALATION) at 11:55

## 2022-05-12 ASSESSMENT — PAIN SCALES - GENERAL
PAINLEVEL_OUTOF10: 7
PAINLEVEL_OUTOF10: 10

## 2022-05-12 ASSESSMENT — PAIN DESCRIPTION - LOCATION: LOCATION: CHEST

## 2022-05-12 ASSESSMENT — HEART SCORE: ECG: 0

## 2022-05-12 ASSESSMENT — PAIN DESCRIPTION - FREQUENCY: FREQUENCY: CONTINUOUS

## 2022-05-12 ASSESSMENT — PAIN DESCRIPTION - DESCRIPTORS: DESCRIPTORS: TIGHTNESS

## 2022-05-12 ASSESSMENT — PAIN - FUNCTIONAL ASSESSMENT: PAIN_FUNCTIONAL_ASSESSMENT: 0-10

## 2022-05-12 ASSESSMENT — PAIN DESCRIPTION - ORIENTATION: ORIENTATION: MID;LEFT

## 2022-05-12 ASSESSMENT — PAIN DESCRIPTION - PAIN TYPE: TYPE: ACUTE PAIN

## 2022-05-12 NOTE — ED PROVIDER NOTES
I have reviewed the below EKG. I was not otherwise involved in this patient's care. EKG  The Ekg interpreted by myself  normal sinus rhythm with a rate of 68  Axis is   Normal  QTc is  normal  Intervals and Durations are unremarkable. No specific ST-T wave changes appreciated. No evidence of acute ischemia.    No significant change from prior EKG dated May 3, 2022        aLuren Shaw MD  05/12/22 [id number]

## 2022-05-12 NOTE — ED PROVIDER NOTES
[facility] Emergency Department    CHIEF COMPLAINT  Chest Pain (patient with midsternal/left chest pain that started this AM. Patient with slight SOB with episode. -nausea, -emesis.)      HISTORY OF PRESENT ILLNESS  Birdie Marino is a 42 y.o. male with a pertinent history of hypertension, hyperlipidemia, cigarette smoker, obesity who presents to the ED complaining of chest pain. Patient reports he woke up this morning with chest pain he describes as a tightness and someone Theador Dowdy hugging me. \"  Patient reports pain is the worst in the left chest wall but does go across to his chest and into his left scapula. Nonexertional.  Patient also reports he felt like his \"heart was beating out of his chest.\"  Patient denies palpitations. Patient does report some slight shortness of breath associated with this chest pain. Patient reports chest pain has been constant since he woke up this morning. No symptoms last evening prior to going to bed. Denies cough or fever. No hemoptysis. Denies nausea, vomiting, acid reflux, abdominal pain, diarrhea, constipation, leg swelling, calf pain. Patient notably just had a hospital admission for chest pain and had a cardiac catheterization on May 5 which showed no coronary blockages no stent was placed EF was 55 to 60% and cardiology recommended a noncardiac evaluation for further work-up of chest pain. No other complaints, modifying factors or associated symptoms. Nursing notes reviewed. Past Medical History:   Diagnosis Date    Hypertension     Migraine     Pneumonia     Seizures (HCC)     temporal seizures as a kid     Past Surgical History:   Procedure Laterality Date    FOOT SURGERY      HAND SURGERY      HERNIA REPAIR      KNEE ARTHROSCOPY       No family history on file.   Social History     Socioeconomic History    Marital status:      Spouse name: Not on file    Number of children: Not on file    Years of education: Not on file    Highest education level: Not on file   Occupational History    Not on file   Tobacco Use    Smoking status: Current Some Day Smoker     Types: Pipe, Cigars    Smokeless tobacco: Never Used   Vaping Use    Vaping Use: Never used   Substance and Sexual Activity    Alcohol use: Yes     Alcohol/week: 0.0 standard drinks     Comment: occasional    Drug use: No    Sexual activity: Not on file   Other Topics Concern    Not on file   Social History Narrative    Not on file     Social Determinants of Health     Financial Resource Strain:     Difficulty of Paying Living Expenses: Not on file   Food Insecurity:     Worried About Running Out of Food in the Last Year: Not on file    Anastasiia of Food in the Last Year: Not on file   Transportation Needs:     Lack of Transportation (Medical): Not on file    Lack of Transportation (Non-Medical): Not on file   Physical Activity:     Days of Exercise per Week: Not on file    Minutes of Exercise per Session: Not on file   Stress:     Feeling of Stress : Not on file   Social Connections:     Frequency of Communication with Friends and Family: Not on file    Frequency of Social Gatherings with Friends and Family: Not on file    Attends [employer] Services: Not on file    Active Member of [address] [place] [address] [employer] or Organizations: Not on file    Attends Club or Organization Meetings: Not on file    Marital Status: Not on file   Intimate Partner Violence:     Fear of Current or Ex-Partner: Not on file    Emotionally Abused: Not on file    Physically Abused: Not on file    Sexually Abused: Not on file   Housing Stability:     Unable to Pay for Housing in the Last Year: Not on file    Number of [place] in the Last Year: Not on file    Unstable Housing in the Last Year: Not on file     No current facility-administered medications for this encounter.      Current Outpatient Medications   Medication Sig Dispense Refill    triamcinolone (KENALOG) 0.1 % cream Apply topically 2 times daily 60 g 0    ascorbic acid (VITAMIN C) 500 MG tablet Take 1 tablet by mouth 2 times daily for 7 days 14 tablet 0    zinc sulfate (ZINCATE) 220 (50 Zn) MG capsule Take 1 capsule by mouth daily for 7 days 7 capsule 0    dicyclomine (BENTYL) 10 MG capsule Take 10 mg by mouth 3 times daily as needed      gabapentin (NEURONTIN) 300 MG capsule Take 300 mg by mouth 3 times daily as needed.  losartan-hydroCHLOROthiazide (HYZAAR) 100-12.5 MG per tablet Take 1 tablet by mouth daily      meloxicam (MOBIC) 15 MG tablet Take 15 mg by mouth daily      hydroCHLOROthiazide (MICROZIDE) 12.5 MG capsule Take 12.5 mg by mouth daily      HYDROcodone-acetaminophen (NORCO) 5-325 MG per tablet       albuterol sulfate HFA (PROVENTIL HFA) 108 (90 Base) MCG/ACT inhaler Inhale 2 puffs into the lungs every 4 hours as needed for Wheezing or Shortness of Breath (Space out to every 6 hours as symptoms improve) Space out to every 6 hours as symptoms improve. 1 each 0    ibuprofen (IBU) 800 MG tablet Take 1 tablet by mouth every 8 hours as needed for Pain. 20 tablet 0    naproxen (NAPROSYN) 500 MG tablet Take 1 tablet by mouth 2 times daily for 10 days. 20 tablet 0    Lansoprazole (PREVACID PO) Take  by mouth. Allergies   Allergen Reactions    Azithromycin Nausea And Vomiting    Ambien [Zolpidem Tartrate]     Codeine Other (See Comments)     dizziness    Lidocaine Hcl     Procaine Hcl     Aloe Itching    Tramadol      Other reaction(s): Other (See Comments), Other (See Comments)  Nightmares   Nightmares          REVIEW OF SYSTEMS  10 systems reviewed, pertinent positives per HPI otherwise noted to be negative    PHYSICAL EXAM  BP (!) 148/93   Pulse 86   Temp 97.9 °F (36.6 °C) (Oral)   Resp 19   Ht 5' 3\" (1.6 m)   Wt 217 lb (98.4 kg)   SpO2 95%   BMI 38.44 kg/m²   GENERAL APPEARANCE: Awake and alert. Cooperative. No acute distress. Vital signs are stable. Well appearing and non toxic.    HEAD: Normocephalic. Atraumatic. EYES: PERRL. EOM's grossly intact. ENT: Mucous membranes are moist.   NECK: Supple. Normal ROM. HEART: RRR. Distal pulses are equal and intact. Cap refill less than 2 seconds. LUNGS: Respirations unlabored. Lung sounds are diminished to auscultation. Good air exchange. Speaking comfortably in full sentences. No wheezing, rhonchi, rales, stridor. CHEST: Nontender  ABDOMEN: Soft. Non-distended. Non-tender. No guarding or rebound. No rigidity. Bowel sounds are present. Negative mulligan's. Negative McBurney's point. Negative CVA tenderness. EXTREMITIES: No peripheral edema. Moves all extremities equally. All extremities neurovascularly intact. SKIN: Warm and dry. No acute rashes. NEUROLOGICAL: Alert and oriented. No gross facial drooping. Strength 5/5, sensation intact. PSYCHIATRIC: Normal mood and affect. SCREENINGS      Heart Score  Heart Score for chest pain patients  History: Slightly Suspicious  ECG: Normal  Patient Age: < 45 years  *Risk factors for Atherosclerotic disease: Obesity,Hypertension,Hypercholesterolemia,Cigarette smoking,Positive family History  Risk Factors: > 3 Risk factors or history of atherosclerotic disease*  Troponin: < 1X normal limit  Heart Score Total: 2         PERC Rule:  Applicable in this patient who has low clinical suspicion for pulmonary embolism. Age < 48years old: Yes  Heart rate < 100 bpm: Yes  Oxygen saturation > 95%: Yes  Hemoptysis: No  Exogenous estrogen use: No  Prior history of DVT or PE: No  Unilateral leg swelling: No  Surgery or significant trauma in the past 4 weeks: No    Based on the above, PE can effectively be ruled out without further testing.         RADIOLOGY    XR CHEST PORTABLE    Result Date: 5/12/2022  EXAMINATION: ONE X-RAY VIEW OF THE CHEST 5/12/2022 11:21 am COMPARISON: Prior studies most recent 05/02/2022 HISTORY: ORDERING SYSTEM PROVIDED HISTORY: chest pain TECHNOLOGIST PROVIDED HISTORY: Reason for exam:->chest pain Reason for Exam: chest pain FINDINGS: AP portable chest with lordotic positioning has been obtained. Electrocardiographic monitor leads overlie the patient's chest. Cardiopericardial silhouette is within normal limits. Pulmonary vasculature is normal.  No focal confluent pulmonary infiltrate is identified. No evidence of pleural effusion or pneumothorax. No evidence of acute cardiopulmonary disease. ED COURSE/MDM  Patient seen and evaluated. Old records reviewed. Diagnostic testing reviewed and results discussed. I have independently evaluated this patient based upon my scope of practice. Supervising physician was in the department for consultation as needed. Nereida Hyatt presented to the ED today with above noted complaints. Upon arrival to emergency department patient is having active chest pain he rates a 10 out of 10. Patient did not take anything for pain prior to arrival.  EKG interpreted by my attending physician. Normal sinus rhythm no sign of ST elevation or ischemia. No change from prior. Based off of physical examination DuoNeb and Decadron was ordered to help alleviate chest tightness and pain. Emergency department work-up unremarkable. Troponin x2 less than 0.01. No leukocytosis, bandemia, anemia, YOSELIN or electrolyte abnormality. No transaminitis    Symptoms did improve after DuoNeb, Decadron, Toradol. We discussed close follow-up with PCP for further outpatient work-up of chest pain.     Given recent cardiac catheterization, unremarkable emergency department work-up low risk heart score I felt comfortable discharging patient home and patient agrees and feels comfortable with this plan    While in ED patient received   Medications   dexamethasone (DECADRON) injection 10 mg (10 mg IntraVENous Given 5/12/22 1155)   ipratropium-albuterol (DUONEB) nebulizer solution 1 ampule (1 ampule Inhalation Given 5/12/22 1155)   ketorolac (TORADOL) injection 30 mg (30 mg IntraVENous Given 5/12/22 1246)             At this point I do not feel the patient requires further work up and it is reasonable to discharge the patient. A discussion was had with the patient and/or their surrogate regarding diagnosis, diagnostic testing results, treatment/ plan of care, and follow up. There was shared decision-making between myself as well as the patient and/or their surrogate and we are all in agreement with discharge home. There was an opportunity for questions and all questions were answered to the best of my ability and to the satisfaction of the patient and/or patient family. Patient will follow up with pcp for further evaluation/treatment. The patient was given strict return precautions as we discussed symptoms that would necessitate return to the ED. Patient will return to ED for new/worsening symptoms. The patient verbalized their understanding and agreement with the above plan. Please refer to AVS for further details regarding discharge instructions.       Results for orders placed or performed during the hospital encounter of 05/12/22   Troponin   Result Value Ref Range    Troponin <0.01 <0.01 ng/mL   CBC with Auto Differential   Result Value Ref Range    WBC 5.6 4.0 - 11.0 K/uL    RBC 5.10 4.20 - 5.90 M/uL    Hemoglobin 14.7 13.5 - 17.5 g/dL    Hematocrit 43.6 40.5 - 52.5 %    MCV 85.4 80.0 - 100.0 fL    MCH 28.7 26.0 - 34.0 pg    MCHC 33.7 31.0 - 36.0 g/dL    RDW 13.3 12.4 - 15.4 %    Platelets 501 950 - 968 K/uL    MPV 8.0 5.0 - 10.5 fL    Neutrophils % 59.2 %    Lymphocytes % 28.9 %    Monocytes % 6.4 %    Eosinophils % 4.2 %    Basophils % 1.3 %    Neutrophils Absolute 3.3 1.7 - 7.7 K/uL    Lymphocytes Absolute 1.6 1.0 - 5.1 K/uL    Monocytes Absolute 0.4 0.0 - 1.3 K/uL    Eosinophils Absolute 0.2 0.0 - 0.6 K/uL    Basophils Absolute 0.1 0.0 - 0.2 K/uL   Comprehensive Metabolic Panel   Result Value Ref Range    Sodium 134 (L) 136 - 145 mmol/L    Potassium 4.4 3.5 - 5.1 mmol/L    Chloride 101 99 - 110 mmol/L    CO2 20 (L) 21 - 32 mmol/L    Anion Gap 13 3 - 16    Glucose 125 (H) 70 - 99 mg/dL    BUN 15 7 - 20 mg/dL    CREATININE 0.7 (L) 0.9 - 1.3 mg/dL    GFR Non-African American >60 >60    GFR African American >60 >60    Calcium 9.5 8.3 - 10.6 mg/dL    Total Protein 7.2 6.4 - 8.2 g/dL    Albumin 4.9 3.4 - 5.0 g/dL    Albumin/Globulin Ratio 2.1 1.1 - 2.2    Total Bilirubin 0.7 0.0 - 1.0 mg/dL    Alkaline Phosphatase 89 40 - 129 U/L     (H) 10 - 40 U/L     (H) 15 - 37 U/L   Troponin   Result Value Ref Range    Troponin <0.01 <0.01 ng/mL   EKG 12 Lead   Result Value Ref Range    Ventricular Rate 68 BPM    Atrial Rate 68 BPM    P-R Interval 150 ms    QRS Duration 94 ms    Q-T Interval 382 ms    QTc Calculation (Bazett) 406 ms    P Axis 23 degrees    R Axis 6 degrees    T Axis 16 degrees    Diagnosis       Normal sinus rhythmNormal ECGWhen compared with ECG of 03-MAY-2022 09:02,No significant change was foundConfirmed by Chuy Daigle MD, Health system ([id number]) on 5/12/2022 6:26:04 PM       I estimate there is LOW risk for PULMONARY EMBOLISM, ACUTE CORONARY SYNDROME, OR THORACIC AORTIC DISSECTION, thus I consider the discharge disposition reasonable. Marcelino Brenner and I have discussed the diagnosis and risks, and we agree with discharging home to follow-up with their primary doctor. We also discussed returning to the Emergency Department immediately if new or worsening symptoms occur. We have discussed the symptoms which are most concerning (e.g., bloody sputum, fever, worsening pain or shortness of breath, vomiting) that necessitate immediate return. FINAL Impression    1. Chest pain, unspecified type        Blood pressure (!) 148/93, pulse 86, temperature 97.9 °F (36.6 °C), temperature source Oral, resp. rate 19, height 5' 3\" (1.6 m), weight 217 lb (98.4 kg), SpO2 95 %.mdm    Patient was sent home with a prescription for below medication/s.   I did Robinson patient on appropriate use of these medication. Discharge Medication List as of 5/12/2022  3:43 PM              FOLLOW UP  DO Conrad Barnard [address]          Formerly [facility]  ED  [address] [facility] [address] [phone]          DISPOSITION  Patient was discharged to home in good condition. Comment: Please note this report has been produced using speech recognition software and may contain errors related to that system including errors in grammar, punctuation, and spelling, as well as words and phrases that may be inappropriate. If there are any questions or concerns please feel free to contact the dictating provider for clarification.             FLORES Bolton - CNP  05/12/22 1946

## 2022-05-12 NOTE — Clinical Note
Vickie Markham was seen and treated in our emergency department on 5/12/2022. He may return to work on 05/14/2022. If you have any questions or concerns, please don't hesitate to call.       Ayana Rueda, FLORES - CNP

## 2022-05-30 ENCOUNTER — HOSPITAL ENCOUNTER (EMERGENCY)
Age: 42
Discharge: HOME | End: 2022-05-30
Attending: EMERGENCY MEDICINE
Payer: MEDICAID

## 2022-05-30 ENCOUNTER — APPOINTMENT (OUTPATIENT)
Dept: GENERAL RADIOLOGY | Age: 42
End: 2022-05-30
Payer: MEDICAID

## 2022-05-30 VITALS
OXYGEN SATURATION: 99 % | HEART RATE: 77 BPM | RESPIRATION RATE: 22 BRPM | DIASTOLIC BLOOD PRESSURE: 96 MMHG | HEIGHT: 63 IN | WEIGHT: 220 LBS | BODY MASS INDEX: 38.98 KG/M2 | TEMPERATURE: 98.4 F | SYSTOLIC BLOOD PRESSURE: 143 MMHG

## 2022-05-30 DIAGNOSIS — G89.29 CHRONIC CHEST PAIN: ICD-10-CM

## 2022-05-30 DIAGNOSIS — R11.2 NAUSEA, VOMITING, AND DIARRHEA: Primary | ICD-10-CM

## 2022-05-30 DIAGNOSIS — R19.7 NAUSEA, VOMITING, AND DIARRHEA: Primary | ICD-10-CM

## 2022-05-30 DIAGNOSIS — R07.9 CHRONIC CHEST PAIN: ICD-10-CM

## 2022-05-30 LAB
A/G RATIO: 3.1 (ref 1.1–2.2)
ALBUMIN SERPL-MCNC: 5.2 G/DL (ref 3.4–5)
ALP BLD-CCNC: 75 U/L (ref 40–129)
ALT SERPL-CCNC: 28 U/L (ref 10–40)
ANION GAP SERPL CALCULATED.3IONS-SCNC: 12 MMOL/L (ref 3–16)
AST SERPL-CCNC: 18 U/L (ref 15–37)
BASOPHILS ABSOLUTE: 0.1 K/UL (ref 0–0.2)
BASOPHILS RELATIVE PERCENT: 1.2 %
BILIRUB SERPL-MCNC: 0.9 MG/DL (ref 0–1)
BUN BLDV-MCNC: 21 MG/DL (ref 7–20)
CALCIUM SERPL-MCNC: 9.6 MG/DL (ref 8.3–10.6)
CHLORIDE BLD-SCNC: 107 MMOL/L (ref 99–110)
CO2: 22 MMOL/L (ref 21–32)
CREAT SERPL-MCNC: 0.8 MG/DL (ref 0.9–1.3)
EOSINOPHILS ABSOLUTE: 0.3 K/UL (ref 0–0.6)
EOSINOPHILS RELATIVE PERCENT: 4.9 %
GFR AFRICAN AMERICAN: >60
GFR NON-AFRICAN AMERICAN: >60
GLUCOSE BLD-MCNC: 118 MG/DL (ref 70–99)
HCT VFR BLD CALC: 42.4 % (ref 40.5–52.5)
HEMOGLOBIN: 14.2 G/DL (ref 13.5–17.5)
LIPASE: 113 U/L (ref 13–60)
LYMPHOCYTES ABSOLUTE: 1.4 K/UL (ref 1–5.1)
LYMPHOCYTES RELATIVE PERCENT: 21.8 %
MCH RBC QN AUTO: 28.9 PG (ref 26–34)
MCHC RBC AUTO-ENTMCNC: 33.5 G/DL (ref 31–36)
MCV RBC AUTO: 86.5 FL (ref 80–100)
MONOCYTES ABSOLUTE: 0.4 K/UL (ref 0–1.3)
MONOCYTES RELATIVE PERCENT: 6.9 %
NEUTROPHILS ABSOLUTE: 4.2 K/UL (ref 1.7–7.7)
NEUTROPHILS RELATIVE PERCENT: 65.2 %
PDW BLD-RTO: 13.3 % (ref 12.4–15.4)
PLATELET # BLD: 310 K/UL (ref 135–450)
PMV BLD AUTO: 8.2 FL (ref 5–10.5)
POTASSIUM SERPL-SCNC: 4.4 MMOL/L (ref 3.5–5.1)
RBC # BLD: 4.9 M/UL (ref 4.2–5.9)
SODIUM BLD-SCNC: 141 MMOL/L (ref 136–145)
TOTAL PROTEIN: 6.9 G/DL (ref 6.4–8.2)
TROPONIN: <0.01 NG/ML
WBC # BLD: 6.4 K/UL (ref 4–11)

## 2022-05-30 PROCEDURE — 6370000000 HC RX 637 (ALT 250 FOR IP): Performed by: EMERGENCY MEDICINE

## 2022-05-30 PROCEDURE — 84484 ASSAY OF TROPONIN QUANT: CPT

## 2022-05-30 PROCEDURE — 80053 COMPREHEN METABOLIC PANEL: CPT

## 2022-05-30 PROCEDURE — 93005 ELECTROCARDIOGRAM TRACING: CPT | Performed by: EMERGENCY MEDICINE

## 2022-05-30 PROCEDURE — 85025 COMPLETE CBC W/AUTO DIFF WBC: CPT

## 2022-05-30 PROCEDURE — 71046 X-RAY EXAM CHEST 2 VIEWS: CPT

## 2022-05-30 PROCEDURE — 99285 EMERGENCY DEPT VISIT HI MDM: CPT

## 2022-05-30 PROCEDURE — 83690 ASSAY OF LIPASE: CPT

## 2022-05-30 RX ORDER — ONDANSETRON 4 MG/1
4 TABLET, ORALLY DISINTEGRATING ORAL ONCE
Status: COMPLETED | OUTPATIENT
Start: 2022-05-30 | End: 2022-05-30

## 2022-05-30 RX ORDER — ONDANSETRON 4 MG/1
4 TABLET, ORALLY DISINTEGRATING ORAL 4 TIMES DAILY PRN
Qty: 15 TABLET | Refills: 0 | Status: SHIPPED | OUTPATIENT
Start: 2022-05-30 | End: 2022-06-04

## 2022-05-30 RX ORDER — CYCLOBENZAPRINE HCL 10 MG
10 TABLET ORAL 3 TIMES DAILY PRN
Qty: 30 TABLET | Refills: 0 | Status: SHIPPED | OUTPATIENT
Start: 2022-05-30 | End: 2022-06-09

## 2022-05-30 RX ORDER — CYCLOBENZAPRINE HCL 10 MG
10 TABLET ORAL ONCE
Status: DISCONTINUED | OUTPATIENT
Start: 2022-05-30 | End: 2022-05-30 | Stop reason: HOSPADM

## 2022-05-30 RX ORDER — SODIUM CHLORIDE, SODIUM LACTATE, POTASSIUM CHLORIDE, AND CALCIUM CHLORIDE .6; .31; .03; .02 G/100ML; G/100ML; G/100ML; G/100ML
1000 INJECTION, SOLUTION INTRAVENOUS ONCE
Status: DISCONTINUED | OUTPATIENT
Start: 2022-05-30 | End: 2022-05-30

## 2022-05-30 RX ORDER — LANSOPRAZOLE 30 MG/1
30 CAPSULE, DELAYED RELEASE ORAL DAILY
Qty: 30 CAPSULE | Refills: 0 | Status: SHIPPED | OUTPATIENT
Start: 2022-05-30

## 2022-05-30 RX ADMIN — ONDANSETRON 4 MG: 4 TABLET, ORALLY DISINTEGRATING ORAL at 16:36

## 2022-05-30 RX ADMIN — ALUMINA, MAGNESIA, AND SIMETHICONE ORAL SUSPENSION REGULAR STRENGTH: 1200; 1200; 120 SUSPENSION ORAL at 16:37

## 2022-05-30 ASSESSMENT — LIFESTYLE VARIABLES: HOW OFTEN DO YOU HAVE A DRINK CONTAINING ALCOHOL: NEVER

## 2022-05-30 NOTE — ED NOTES
Educated pt on x3 prescriptions and discharge paperwork as well as follow-up appointment. Pt verbalizes understanding of all instructions and denies questions. Pt left ambulatory by self with all personal belongings, and discharge paperwork to private residence. Pt in no distress at this time. Prescriptions x3 sent as E-Scripts. Pt instructed on how to  prescriptions. Pt verbalizes understanding.          Vivian Hernandez RN  05/30/22 2998

## 2022-05-30 NOTE — Clinical Note
Lynda Muñoz was seen and treated in our emergency department on 5/30/2022. He may return to work on 05/31/2022. If you have any questions or concerns, please don't hesitate to call.       Anu Obregon MD

## 2022-05-30 NOTE — ED PROVIDER NOTES
Emergency Physician Note        Note Open Time: 2:18 PM EDT    Chief Complaint  Chest Pain (has had chest pain \" for awhile\" , throwing up all night)       History of Present Illness  Nga Adler is a 42 y.o. male who presents to the ED for vomiting and diarrhea. Patient reports that he has had chest pain for about a month which is constant all day long and feels like being tied up across his chest anteriorly. He is here primarily though because of vomiting and diarrhea since yesterday evening. Patient does not report any black or bloody stools. No fevers. He is not sure if he ate anything that may have bothered his stomach. He already had stress testing and cardiac evaluation and was told the pain was noncardiac. He is scheduled to see his gastroenterologist in the next month. 10 systems reviewed, pertinent positives per HPI otherwise noted to be negative    I have reviewed the following from the nursing documentation:      Prior to Admission medications    Medication Sig Start Date End Date Taking? Authorizing Provider   triamcinolone (KENALOG) 0.1 % cream Apply topically 2 times daily 3/31/22   Ramirez Hutton PA-C   ascorbic acid (VITAMIN C) 500 MG tablet Take 1 tablet by mouth 2 times daily for 7 days 1/27/22 2/3/22  JOSE Sutton   zinc sulfate (ZINCATE) 220 (50 Zn) MG capsule Take 1 capsule by mouth daily for 7 days 1/27/22 2/3/22  JOSE Sutton   dicyclomine (BENTYL) 10 MG capsule Take 10 mg by mouth 3 times daily as needed 8/27/21   Historical Provider, MD   gabapentin (NEURONTIN) 300 MG capsule Take 300 mg by mouth 3 times daily as needed.  12/3/21   Historical Provider, MD   losartan-hydroCHLOROthiazide (HYZAAR) 100-12.5 MG per tablet Take 1 tablet by mouth daily 9/8/21   Historical Provider, MD   meloxicam (MOBIC) 15 MG tablet Take 15 mg by mouth daily 8/27/21   Historical Provider, MD   hydroCHLOROthiazide (MICROZIDE) 12.5 MG capsule Take 12.5 mg by mouth daily Historical Provider, MD   HYDROcodone-acetaminophen [facility]) 5-325 MG per tablet  11/5/21   Historical Provider, MD   albuterol sulfate HFA (PROVENTIL HFA) 108 (90 Base) MCG/ACT inhaler Inhale 2 puffs into the lungs every 4 hours as needed for Wheezing or Shortness of Breath (Space out to every 6 hours as symptoms improve) Space out to every 6 hours as symptoms improve. 11/9/21   FLORES Washington - CNP   ibuprofen (IBU) 800 MG tablet Take 1 tablet by mouth every 8 hours as needed for Pain. 2/18/14   Rod Cadena PA-C   naproxen (NAPROSYN) 500 MG tablet Take 1 tablet by mouth 2 times daily for 10 days. 9/4/13 9/14/13  Lewis Douglass MD   Lansoprazole (PREVACID PO) Take  by mouth. Historical Provider, MD       Allergies as of 05/30/2022 - Fully Reviewed 05/30/2022   Allergen Reaction Noted    Azithromycin Nausea And Vomiting 11/28/2012    Ambien [zolpidem tartrate]  10/09/2012    Codeine Other (See Comments) 10/09/2012    Lidocaine hcl  07/06/2017    Procaine hcl  10/09/2012    Aloe Itching 07/09/2016    Tramadol  09/26/2015       Past Medical History:   Diagnosis Date    Hypertension     Migraine     Pneumonia     Seizures ([address]     temporal seizures as a kid        Surgical History:   Past Surgical History:   Procedure Laterality Date    FOOT SURGERY      HAND SURGERY      HERNIA REPAIR      KNEE ARTHROSCOPY          Family History:  History reviewed. No pertinent family history. Social History     Socioeconomic History    Marital status:      Spouse name: Not on file    Number of children: Not on file    Years of education: Not on file    Highest education level: Not on file   Occupational History    Not on file   Tobacco Use    Smoking status: Current Some Day Smoker     Types: Pipe, Cigars    Smokeless tobacco: Never Used   Vaping Use    Vaping Use: Never used   Substance and Sexual Activity    Alcohol use:  Yes     Alcohol/week: 0.0 standard drinks     Comment: occasional    Drug use: No    Sexual activity: Not on file   Other Topics Concern    Not on file   Social History Narrative    Not on file     Social Determinants of Health     Financial Resource Strain:     Difficulty of Paying Living Expenses: Not on file   Food Insecurity:     Worried About Running Out of Food in the Last Year: Not on file    Anastasiia of Food in the Last Year: Not on file   Transportation Needs:     Lack of Transportation (Medical): Not on file    Lack of Transportation (Non-Medical): Not on file   Physical Activity:     Days of Exercise per Week: Not on file    Minutes of Exercise per Session: Not on file   Stress:     Feeling of Stress : Not on file   Social Connections:     Frequency of Communication with Friends and Family: Not on file    Frequency of Social Gatherings with Friends and Family: Not on file    Attends [employer] Services: Not on file    Active Member of [address] [place] [address] [employer] or Organizations: Not on file    Attends Club or Organization Meetings: Not on file    Marital Status: Not on file   Intimate Partner Violence:     Fear of Current or Ex-Partner: Not on file    Emotionally Abused: Not on file    Physically Abused: Not on file    Sexually Abused: Not on file   Housing Stability:     Unable to Pay for Housing in the Last Year: Not on file    Number of [place] in the Last Year: Not on file    Unstable Housing in the Last Year: Not on file       Nursing notes reviewed. ED Triage Vitals [05/30/22 1357]   Enc Vitals Group      BP (!) 164/99      Heart Rate 73      Resp 16      Temp 98.4 °F (36.9 °C)      Temp src       SpO2 96 %      Weight 220 lb (99.8 kg)      Height 5' 3\" (1.6 m)      Head Circumference       Peak Flow       Pain Score       Pain Loc       Pain Edu? Excl. in [address]? GENERAL:  Awake, alert. Well developed, well nourished with no apparent distress. HENT:  Normocephalic, Atraumatic, moist mucous membranes.   EYES:  Pupils equal round and reactive to light, Conjunctiva normal, extraocular movements normal.  NECK:  No meningeal signs, Supple. CHEST:  Regular rate and rhythm, chest wall non-tender. LUNGS:  Clear to auscultation bilaterally. ABDOMEN:  Soft, non-tender, no rebound, rigidity or guarding, non-distended, normal bowel sounds. No costovertebral angle tenderness to palpation. BACK:  No tenderness. EXTREMITIES:  Normal range of motion, no edema, no bony tenderness, no deformity, distal pulses present. SKIN: Warm, dry and intact. NEUROLOGIC: Normal mental status. Moving all extremities to command. LABS and DIAGNOSTIC RESULTS  EKG  The Ekg interpreted by me shows  normal sinus rhythm with a rate of 76  Axis is   Normal  QTc is  normal  Intervals and Durations are unremarkable. ST Segments: no acute change  Delta waves, Brugada Syndrome, and Short KS are not present. No significant change from prior EKG dated 12 may 2022    RADIOLOGY  X-RAYS:  I have reviewed radiologic plain film image(s). ALL OTHER NON-PLAIN FILM IMAGES SUCH AS CT, ULTRASOUND AND MRI HAVE BEEN READ BY THE RADIOLOGIST. XR CHEST (2 VW)   Preliminary Result   No acute cardiopulmonary disease. LABS  Labs Reviewed   COMPREHENSIVE METABOLIC PANEL - Abnormal; Notable for the following components:       Result Value    Glucose 118 (*)     BUN 21 (*)     CREATININE 0.8 (*)     Albumin 5.2 (*)     Albumin/Globulin Ratio 3.1 (*)     All other components within normal limits   LIPASE - Abnormal; Notable for the following components:    Lipase 113.0 (*)     All other components within normal limits   CBC WITH AUTO DIFFERENTIAL   TROPONIN       MEDICAL DECISION MAKING        I advised the patient to return to the emergency department immediately for any new or worsening symptoms, such as shortness of breath, fever or any bleeding. The patient voiced agreement and understanding of the treatment plan.           I estimate there is LOW risk for PULMONARY EMBOLISM, ACUTE CORONARY SYNDROME, OR THORACIC AORTIC DISSECTION, thus I consider the discharge disposition reasonable. Birdie Marino and I have discussed the diagnosis and risks, and we agree with discharging home to follow-up with their primary doctor. We also discussed returning to the Emergency Department immediately if new or worsening symptoms occur. We have discussed the symptoms which are most concerning (e.g., bloody sputum, fever, worsening pain or shortness of breath, vomiting) that necessitate immediate return. FINAL Impression    1. Nausea, vomiting, and diarrhea    2. Chronic chest pain        Blood pressure (!) 143/96, pulse 77, temperature 98.4 °F (36.9 °C), resp. rate 22, height 5' 3\" (1.6 m), weight 220 lb (99.8 kg), SpO2 99 %. Patient was given scripts for the following medications. I counseled patient how to take these medications. Discharge Medication List as of 5/30/2022  5:24 PM      START taking these medications    Details   ondansetron (ZOFRAN ODT) 4 MG disintegrating tablet Take 1 tablet by mouth 4 times daily as needed for Nausea or Vomiting, Disp-15 tablet, R-0Normal      lansoprazole (PREVACID) 30 MG delayed release capsule Take 1 capsule by mouth daily, Disp-30 capsule, R-0Normal      cyclobenzaprine (FLEXERIL) 10 mg tablet Take 1 tablet by mouth 3 times daily as needed for Muscle spasms, Disp-30 tablet, R-0Normal             Disposition  Pt is in good condition upon Discharge to home. This chart was generated using the [address] [place] [address] [address]  dictation system. I created this record but it may contain dictation errors.          Jackie Hargrove MD  05/30/22 2686

## 2022-06-02 LAB
EKG ATRIAL RATE: 76 BPM
EKG DIAGNOSIS: NORMAL
EKG P AXIS: 24 DEGREES
EKG P-R INTERVAL: 154 MS
EKG Q-T INTERVAL: 382 MS
EKG QRS DURATION: 90 MS
EKG QTC CALCULATION (BAZETT): 429 MS
EKG R AXIS: -4 DEGREES
EKG T AXIS: 10 DEGREES
EKG VENTRICULAR RATE: 76 BPM

## 2022-06-02 PROCEDURE — 93010 ELECTROCARDIOGRAM REPORT: CPT | Performed by: INTERNAL MEDICINE

## 2022-08-03 ENCOUNTER — HOSPITAL ENCOUNTER (EMERGENCY)
Age: 42
Discharge: HOME | End: 2022-08-03
Payer: MEDICAID

## 2022-08-03 VITALS
OXYGEN SATURATION: 96 % | DIASTOLIC BLOOD PRESSURE: 96 MMHG | BODY MASS INDEX: 39.16 KG/M2 | RESPIRATION RATE: 16 BRPM | TEMPERATURE: 97.2 F | WEIGHT: 221 LBS | HEART RATE: 76 BPM | HEIGHT: 63 IN | SYSTOLIC BLOOD PRESSURE: 150 MMHG

## 2022-08-03 DIAGNOSIS — H60.393 INFECTIVE OTITIS EXTERNA OF BOTH EARS: Primary | ICD-10-CM

## 2022-08-03 PROCEDURE — 99283 EMERGENCY DEPT VISIT LOW MDM: CPT

## 2022-08-03 PROCEDURE — 87205 SMEAR GRAM STAIN: CPT

## 2022-08-03 PROCEDURE — 87077 CULTURE AEROBIC IDENTIFY: CPT

## 2022-08-03 PROCEDURE — 69200 CLEAR OUTER EAR CANAL: CPT

## 2022-08-03 PROCEDURE — 87070 CULTURE OTHR SPECIMN AEROBIC: CPT

## 2022-08-03 PROCEDURE — 87186 SC STD MICRODIL/AGAR DIL: CPT

## 2022-08-03 RX ORDER — CIPROFLOXACIN 500 MG/1
500 TABLET, FILM COATED ORAL 2 TIMES DAILY
Qty: 14 TABLET | Refills: 0 | Status: SHIPPED | OUTPATIENT
Start: 2022-08-03 | End: 2022-08-10

## 2022-08-03 RX ORDER — PREDNISONE 10 MG/1
20 TABLET ORAL DAILY
Qty: 10 TABLET | Refills: 0 | Status: SHIPPED | OUTPATIENT
Start: 2022-08-03 | End: 2022-08-08

## 2022-08-03 ASSESSMENT — PAIN SCALES - GENERAL: PAINLEVEL_OUTOF10: 10

## 2022-08-03 ASSESSMENT — PAIN - FUNCTIONAL ASSESSMENT: PAIN_FUNCTIONAL_ASSESSMENT: 0-10

## 2022-08-03 NOTE — ED NOTES
Pt scripts x2 instructed to follow up with PCP. Assessed per PCP.      Lidia Armstrong LPN  13/02/06 8002

## 2022-08-03 NOTE — ED PROVIDER NOTES
[facility]  ED  EMERGENCY DEPARTMENT ENCOUNTER        Pt Name: Michael Miguel  MRN: [id number]  [place] 1980  Date of evaluation: 8/3/2022  Provider: Jackie Monroe PA-C  PCP: Tal Lockett DO  Note Started: 2:48 PM EDT       HENRY. I have evaluated this patient. My supervising physician was available for consultation. Álvaro Basurto      CHIEF COMPLAINT       Chief Complaint   Patient presents with    Otalgia     Per pt seen PCP on Monday for bilateral earache placed on ABX for infection my right ear is getting worse right side of jaw swollen       HISTORY OF PRESENT ILLNESS   (Location, Timing/Onset, Context/Setting, Quality, Duration, Modifying Factors, Severity, Associated Signs and Symptoms)  Note limiting factors. Chief Complaint: Bilateral ear pain    Michael Miguel is a 42 y.o. male who presents with lateral ear pain. Seen PCP 8/1/2022. At that time left worse than right. Otowick placed on left. Left wick falls out while in ED. Pain worse on right with decreased hearing due to swelling and drainage. States the Ciprodex prescribed will not go into the canal due to the closing of the canal.  Patient also prescribed Augmentin 875 twice daily. Taking as prescribed. Patient has not followed with ENT. This is now 48 hours post otowick placement. Nursing Notes were all reviewed and agreed with or any disagreements were addressed in the HPI. REVIEW OF SYSTEMS    (2-9 systems for level 4, 10 or more for level 5)     Review of Systems    Positives and Pertinent negatives as per HPI. Except as noted above in the ROS, all other systems were reviewed and negative.        PAST MEDICAL HISTORY     Past Medical History:   Diagnosis Date    Hypertension     Migraine     Pneumonia     Seizures ([address]     temporal seizures as a kid         SURGICAL HISTORY     Past Surgical History:   Procedure Laterality Date    FOOT SURGERY      HAND SURGERY      HERNIA REPAIR KNEE ARTHROSCOPY           CURRENTMEDICATIONS       Previous Medications    ALBUTEROL SULFATE HFA (PROVENTIL HFA) 108 (90 BASE) MCG/ACT INHALER    Inhale 2 puffs into the lungs every 4 hours as needed for Wheezing or Shortness of Breath (Space out to every 6 hours as symptoms improve) Space out to every 6 hours as symptoms improve. ASCORBIC ACID (VITAMIN C) 500 MG TABLET    Take 1 tablet by mouth 2 times daily for 7 days    DICYCLOMINE (BENTYL) 10 MG CAPSULE    Take 10 mg by mouth 3 times daily as needed    GABAPENTIN (NEURONTIN) 300 MG CAPSULE    Take 300 mg by mouth 3 times daily as needed. HYDROCHLOROTHIAZIDE (MICROZIDE) 12.5 MG CAPSULE    Take 12.5 mg by mouth daily    HYDROCODONE-ACETAMINOPHEN (NORCO) 5-325 MG PER TABLET        IBUPROFEN (IBU) 800 MG TABLET    Take 1 tablet by mouth every 8 hours as needed for Pain. LANSOPRAZOLE (PREVACID) 30 MG DELAYED RELEASE CAPSULE    Take 1 capsule by mouth daily    LOSARTAN-HYDROCHLOROTHIAZIDE (HYZAAR) 100-12.5 MG PER TABLET    Take 1 tablet by mouth daily    MELOXICAM (MOBIC) 15 MG TABLET    Take 15 mg by mouth daily    NAPROXEN (NAPROSYN) 500 MG TABLET    Take 1 tablet by mouth 2 times daily for 10 days. TRIAMCINOLONE (KENALOG) 0.1 % CREAM    Apply topically 2 times daily    ZINC SULFATE (ZINCATE) 220 (50 ZN) MG CAPSULE    Take 1 capsule by mouth daily for 7 days         ALLERGIES     Azithromycin, Ambien [zolpidem tartrate], Codeine, Lidocaine hcl, Procaine hcl, Aloe, and Tramadol    FAMILYHISTORY     No family history on file. SOCIAL HISTORY       Social History     Tobacco Use    Smoking status: Some Days     Types: Pipe, Cigars    Smokeless tobacco: Never   Vaping Use    Vaping Use: Never used   Substance Use Topics    Alcohol use:  Yes     Alcohol/week: 0.0 standard drinks     Comment: occasional    Drug use: No       SCREENINGS    [place] Coma Scale  Eye Opening: Spontaneous  Best Verbal Response: Oriented  Best Motor Response: Obeys commands  John Coma Scale Score: 15        PHYSICAL EXAM    (up to 7 for level 4, 8 or more for level 5)     ED Triage Vitals [08/03/22 1356]   BP Temp Temp Source Heart Rate Resp SpO2 Height Weight   (!) 163/90 97.2 °F (36.2 °C) Oral 76 16 96 % 5' 3\" (1.6 m) 221 lb (100.2 kg)       Physical Exam  Vitals and nursing note reviewed. Constitutional:       Appearance: Normal appearance. He is well-developed. He is obese. HENT:      Head: Normocephalic and atraumatic. Right Ear: Tympanic membrane and external ear normal.      Left Ear: Tympanic membrane and external ear normal.      Ears:      Comments: Patient with drainage bilaterally. The auditory canal closed on the right. Somewhat patent on the left with the falling out of the otowick. Moisture noted both canals. Crusting noted auditory meatus on the right. I did place an otowick on the right. Mouth/Throat:      Pharynx: Oropharynx is clear. Eyes:      General: No scleral icterus. Right eye: No discharge. Left eye: No discharge. Conjunctiva/sclera: Conjunctivae normal.   Cardiovascular:      Rate and Rhythm: Normal rate and regular rhythm. Heart sounds: Normal heart sounds. Pulmonary:      Effort: Pulmonary effort is normal.      Breath sounds: Normal breath sounds. Abdominal:      General: Abdomen is flat. Bowel sounds are normal.      Palpations: Abdomen is soft. Tenderness: There is no abdominal tenderness. Musculoskeletal:         General: Normal range of motion. Cervical back: Normal range of motion and neck supple. Right lower leg: No edema. Left lower leg: No edema. Skin:     General: Skin is warm and dry. Neurological:      General: No focal deficit present. Mental Status: He is alert and oriented to person, place, and time. Mental status is at baseline. Psychiatric:         Mood and Affect: Mood normal.         Behavior: Behavior normal.         Thought Content:  Thought content normal.         Judgment: Judgment normal.       DIAGNOSTIC RESULTS   LABS:    Labs Reviewed   CULTURE, WOUND       When ordered only abnormal lab results are displayed. All other labs were within normal range or not returned as of this dictation. EKG: When ordered, EKG's are interpreted by the Emergency Department Physician in the absence of a cardiologist.  Please see their note for interpretation of EKG. RADIOLOGY:   Non-plain film images such as CT, Ultrasound and MRI are read by the radiologist. Plain radiographic images are visualized and preliminarily interpreted by the ED Provider with the below findings:        Interpretation per the Radiologist below, if available at the time of this note:    No orders to display     No results found. PROCEDURES     I did place otowick on the right. Some discomfort appreciated. I did obtain culture from the drainage. Procedures    CRITICAL CARE TIME       CONSULTS:  None      EMERGENCY DEPARTMENT COURSE and DIFFERENTIAL DIAGNOSIS/MDM:   Vitals:    Vitals:    08/03/22 1356   BP: (!) 163/90   Pulse: 76   Resp: 16   Temp: 97.2 °F (36.2 °C)   TempSrc: Oral   SpO2: 96%   Weight: 221 lb (100.2 kg)   Height: 5' 3\" (1.6 m)       Patient was given the following medications:  Medications - No data to display      Is this patient to be included in the SEP-1 Core Measure due to severe sepsis or septic shock? No   Exclusion criteria - the patient is NOT to be included for SEP-1 Core Measure due to: Infection is not suspected    Patient with bilateral otitis externa. External ear structures not involved. Actually small ear canals. The otowick placed 48 hours ago by PCP follow-up. Canal is narrowed but I can see patency to the eardrum. I do note canal closure on the right. I was able to firmly but gently passed an otowick on the right. Discontinue Augmentin. Start Cipro 500 twice daily #14. Follow with ENT on Friday.   Recommend contact Dr. Heather Ruby Sanya to schedule that appointment. If unavailable I did recommend any provider at any location to be seen on Friday. Patient will continue the Ciprodex as prescribed. In edition I did start patient on prednisone 20 mg daily for the next 5 days. FINAL IMPRESSION      1. Infective otitis externa of both ears          DISPOSITION/PLAN   DISPOSITION Decision To Discharge 08/03/2022 02:45:28 PM      PATIENT REFERRED TO:  Ernesto Coronado DO  [address] [place]  [phone]    Schedule an appointment as soon as possible for a visit in 2 days  Recommend Dr. Khloe Christensen or associate, any available provider    Dyllan Mesa DO  [facility] [phone]    Schedule an appointment as soon as possible for a visit   As needed    [facility] [address] [phone]  Go to   If symptoms worsen      DISCHARGE MEDICATIONS:  New Prescriptions    CIPROFLOXACIN (CIPRO) 500 MG TABLET    Take 1 tablet by mouth in the morning and 1 tablet before bedtime. Do all this for 7 days. PREDNISONE (DELTASONE) 10 MG TABLET    Take 2 tablets by mouth in the morning for 5 doses. DISCONTINUED MEDICATIONS:  Discontinued Medications    No medications on file              (Please note that portions of this note were completed with a voice recognition program.  Efforts were made to edit the dictations but occasionally words are mis-transcribed. )    Miri Varghese PA-C (electronically signed)           Miri Varghese PA-C  08/03/22 7418

## 2022-08-03 NOTE — DISCHARGE INSTRUCTIONS
I do believe you have an actually small your canals. You do have bilateral otitis externa. Worse on the right today. Otowick placed. Discontinue Augmentin. I did send prescription for both prednisone and Cipro to your local [facility] on list [facility]. Contact ENT physician for an appointment on Friday. I do recommend any location with any provider, but to be seen on Friday. I do recommend ibuprofen 600 mg 3 times daily and Tylenol 1000 mg 3 times daily for primary pain control. Warm compress over the external ear may benefit.

## 2022-08-06 LAB
GRAM STAIN RESULT: ABNORMAL
ORGANISM: ABNORMAL
ORGANISM: ABNORMAL
WOUND/ABSCESS: ABNORMAL

## 2022-08-08 ENCOUNTER — HOSPITAL ENCOUNTER (EMERGENCY)
Age: 42
Discharge: HOME | End: 2022-08-08
Attending: EMERGENCY MEDICINE
Payer: MEDICAID

## 2022-08-08 ENCOUNTER — APPOINTMENT (OUTPATIENT)
Dept: GENERAL RADIOLOGY | Age: 42
End: 2022-08-08
Payer: MEDICAID

## 2022-08-08 VITALS
WEIGHT: 225 LBS | TEMPERATURE: 98.4 F | HEIGHT: 63 IN | OXYGEN SATURATION: 98 % | RESPIRATION RATE: 18 BRPM | SYSTOLIC BLOOD PRESSURE: 167 MMHG | BODY MASS INDEX: 39.87 KG/M2 | DIASTOLIC BLOOD PRESSURE: 101 MMHG | HEART RATE: 78 BPM

## 2022-08-08 DIAGNOSIS — H60.393 INFECTIVE OTITIS EXTERNA OF BOTH EARS: ICD-10-CM

## 2022-08-08 DIAGNOSIS — R07.9 CHEST PAIN, UNSPECIFIED TYPE: Primary | ICD-10-CM

## 2022-08-08 LAB
A/G RATIO: 1.8 (ref 1.1–2.2)
ALBUMIN SERPL-MCNC: 4.8 G/DL (ref 3.4–5)
ALP BLD-CCNC: 124 U/L (ref 40–129)
ALT SERPL-CCNC: 131 U/L (ref 10–40)
ANION GAP SERPL CALCULATED.3IONS-SCNC: 15 MMOL/L (ref 3–16)
ANISOCYTOSIS: ABNORMAL
AST SERPL-CCNC: 27 U/L (ref 15–37)
ATYPICAL LYMPHOCYTE RELATIVE PERCENT: 2 % (ref 0–6)
BANDED NEUTROPHILS RELATIVE PERCENT: 5 % (ref 0–7)
BASOPHILS ABSOLUTE: 0.1 K/UL (ref 0–0.2)
BASOPHILS RELATIVE PERCENT: 1 %
BILIRUB SERPL-MCNC: 0.4 MG/DL (ref 0–1)
BUN BLDV-MCNC: 13 MG/DL (ref 7–20)
CALCIUM SERPL-MCNC: 10.3 MG/DL (ref 8.3–10.6)
CHLORIDE BLD-SCNC: 96 MMOL/L (ref 99–110)
CO2: 22 MMOL/L (ref 21–32)
CREAT SERPL-MCNC: 0.8 MG/DL (ref 0.9–1.3)
EOSINOPHILS ABSOLUTE: 0 K/UL (ref 0–0.6)
EOSINOPHILS RELATIVE PERCENT: 0 %
GFR AFRICAN AMERICAN: >60
GFR NON-AFRICAN AMERICAN: >60
GLUCOSE BLD-MCNC: 353 MG/DL (ref 70–99)
HCT VFR BLD CALC: 41.3 % (ref 40.5–52.5)
HEMOGLOBIN: 14 G/DL (ref 13.5–17.5)
LYMPHOCYTES ABSOLUTE: 1.3 K/UL (ref 1–5.1)
LYMPHOCYTES RELATIVE PERCENT: 10 %
MACROCYTES: ABNORMAL
MCH RBC QN AUTO: 28.8 PG (ref 26–34)
MCHC RBC AUTO-ENTMCNC: 33.9 G/DL (ref 31–36)
MCV RBC AUTO: 84.9 FL (ref 80–100)
MONOCYTES ABSOLUTE: 0.2 K/UL (ref 0–1.3)
MONOCYTES RELATIVE PERCENT: 2 %
NEUTROPHILS ABSOLUTE: 9.1 K/UL (ref 1.7–7.7)
NEUTROPHILS RELATIVE PERCENT: 80 %
PDW BLD-RTO: 13 % (ref 12.4–15.4)
PLATELET # BLD: 447 K/UL (ref 135–450)
PLATELET SLIDE REVIEW: ADEQUATE
PMV BLD AUTO: 7.9 FL (ref 5–10.5)
POIKILOCYTES: ABNORMAL
POLYCHROMASIA: ABNORMAL
POTASSIUM REFLEX MAGNESIUM: 3.9 MMOL/L (ref 3.5–5.1)
RBC # BLD: 4.86 M/UL (ref 4.2–5.9)
SLIDE REVIEW: ABNORMAL
SODIUM BLD-SCNC: 133 MMOL/L (ref 136–145)
TOTAL PROTEIN: 7.5 G/DL (ref 6.4–8.2)
TROPONIN: <0.01 NG/ML
WBC # BLD: 10.7 K/UL (ref 4–11)

## 2022-08-08 PROCEDURE — 93005 ELECTROCARDIOGRAM TRACING: CPT | Performed by: EMERGENCY MEDICINE

## 2022-08-08 PROCEDURE — 6360000002 HC RX W HCPCS: Performed by: EMERGENCY MEDICINE

## 2022-08-08 PROCEDURE — 80053 COMPREHEN METABOLIC PANEL: CPT

## 2022-08-08 PROCEDURE — 85025 COMPLETE CBC W/AUTO DIFF WBC: CPT

## 2022-08-08 PROCEDURE — 99285 EMERGENCY DEPT VISIT HI MDM: CPT

## 2022-08-08 PROCEDURE — 84484 ASSAY OF TROPONIN QUANT: CPT

## 2022-08-08 PROCEDURE — 71046 X-RAY EXAM CHEST 2 VIEWS: CPT

## 2022-08-08 PROCEDURE — 96372 THER/PROPH/DIAG INJ SC/IM: CPT

## 2022-08-08 RX ORDER — KETOROLAC TROMETHAMINE 30 MG/ML
15 INJECTION, SOLUTION INTRAMUSCULAR; INTRAVENOUS ONCE
Status: COMPLETED | OUTPATIENT
Start: 2022-08-08 | End: 2022-08-08

## 2022-08-08 RX ORDER — ACETIC ACID 20.65 MG/ML
4 SOLUTION AURICULAR (OTIC) 3 TIMES DAILY
Qty: 15 ML | Refills: 0 | Status: SHIPPED | OUTPATIENT
Start: 2022-08-08 | End: 2022-08-15

## 2022-08-08 RX ORDER — CLINDAMYCIN HYDROCHLORIDE 300 MG/1
300 CAPSULE ORAL 4 TIMES DAILY
Qty: 28 CAPSULE | Refills: 0 | Status: SHIPPED | OUTPATIENT
Start: 2022-08-08 | End: 2022-08-15

## 2022-08-08 RX ORDER — TIZANIDINE 4 MG/1
4 TABLET ORAL NIGHTLY PRN
Qty: 10 TABLET | Refills: 0 | Status: SHIPPED | OUTPATIENT
Start: 2022-08-08

## 2022-08-08 RX ADMIN — KETOROLAC TROMETHAMINE 15 MG: 30 INJECTION, SOLUTION INTRAMUSCULAR at 22:53

## 2022-08-08 ASSESSMENT — PAIN SCALES - GENERAL
PAINLEVEL_OUTOF10: 10
PAINLEVEL_OUTOF10: 10

## 2022-08-08 ASSESSMENT — PAIN DESCRIPTION - DESCRIPTORS: DESCRIPTORS: ACHING

## 2022-08-08 ASSESSMENT — PAIN DESCRIPTION - LOCATION: LOCATION: CHEST

## 2022-08-08 ASSESSMENT — PAIN - FUNCTIONAL ASSESSMENT: PAIN_FUNCTIONAL_ASSESSMENT: 0-10

## 2022-08-09 LAB
EKG ATRIAL RATE: 96 BPM
EKG DIAGNOSIS: NORMAL
EKG P AXIS: 41 DEGREES
EKG P-R INTERVAL: 148 MS
EKG Q-T INTERVAL: 356 MS
EKG QRS DURATION: 88 MS
EKG QTC CALCULATION (BAZETT): 449 MS
EKG R AXIS: 35 DEGREES
EKG T AXIS: 22 DEGREES
EKG VENTRICULAR RATE: 96 BPM

## 2022-08-09 PROCEDURE — 93010 ELECTROCARDIOGRAM REPORT: CPT | Performed by: INTERNAL MEDICINE

## 2022-08-09 NOTE — ED NOTES
Discharge instructions reviewed with Pt. Pt verbalizes understanding at this time. Prescriptions/medications reviewed with pt at this time. VS as noted. Pt condition stable at this time. No concerns voiced.       Shireen Macias RN  08/08/22 [id number]

## 2022-08-10 ASSESSMENT — ENCOUNTER SYMPTOMS
COLOR CHANGE: 0
WHEEZING: 0
ABDOMINAL PAIN: 0
BACK PAIN: 0
PHOTOPHOBIA: 0
RHINORRHEA: 0
VOMITING: 0
NAUSEA: 0
SHORTNESS OF BREATH: 0
CHEST TIGHTNESS: 1

## 2022-08-10 NOTE — ED PROVIDER NOTES
[address] [facility]  ED  EMERGENCY DEPARTMENTENCOUNTER      Pt Name: Joao Saab  MRN: [id number]  Armssamanthagfdavida 1980  Date ofevaluation: 8/8/2022  Provider: Aubrey Johnson MD    CHIEF COMPLAINT       Chief Complaint   Patient presents with    Chest Pain     X3 months, worse over last week and half. Recent negative stress test and angiogram.    Otalgia     X2 weeks, bilateral. Hearing loss in left ear. HISTORY OF PRESENT ILLNESS   (Location/Symptom, Timing/Onset,Context/Setting, Quality, Duration, Modifying Factors, Severity)  Note limiting factors. Joao Saab is a 42 y.o. male  who  has a past medical history of Hypertension, Migraine, Pneumonia, and Seizures ([facility] [address] who presents to the emergency department valuation of chest pain and bilateral ear pain. Patient reports intermittent episodes of chest pain that occurs to the left lateral chest.  Reports that it will intermittently worsen and not improve without remitting exacerbating factors. Denies dizziness shortness of breath. Has pain with movements. Reports he has seen a gastroenterologist, cardiologist, and musculoskeletal specialist and does not have an etiology of the symptoms. Reports that he was having pain this evening which caused him to present to the ED. reports medications at home were not working. He states he is here to try multiple medications for symptoms to treat his very etiologies. Denies pain or swelling to lower extremities. Denies overlying skin changes. Patient also reports that he is being treated for bilateral ear infections. Reports he is currently taking Ciprodex as well as oral ciprofloxacin. Reports he has been having persistent symptoms. Denies fevers. Reports that he does feels her canals are occluded. HPI    NursingNotes were reviewed.     REVIEW OF SYSTEMS    (2-9 systems for level 4, 10 or more for level 5)     Review of Systems   Constitutional:  Negative for activity change, chills and fever.   HENT:  Positive for ear discharge and ear pain. Negative for congestion and rhinorrhea. Eyes:  Negative for photophobia and visual disturbance. Respiratory:  Positive for chest tightness. Negative for shortness of breath and wheezing. Cardiovascular:  Positive for chest pain. Negative for palpitations and leg swelling. Gastrointestinal:  Negative for abdominal pain, nausea and vomiting. Endocrine: Negative for polydipsia and polyuria. Genitourinary:  Negative for difficulty urinating and frequency. Musculoskeletal:  Negative for back pain and gait problem. Skin:  Negative for color change and rash. Neurological:  Negative for light-headedness and headaches. Psychiatric/Behavioral:  Negative for confusion. The patient is not nervous/anxious. All other systems reviewed and are negative. Except as noted above the remainder of the review of systems was reviewed and negative. PAST MEDICAL HISTORY     Past Medical History:   Diagnosis Date    Hypertension     Migraine     Pneumonia     Seizures ([facility] [address]     temporal seizures as a kid         SURGICALHISTORY       Past Surgical History:   Procedure Laterality Date    FOOT SURGERY      HAND SURGERY      HERNIA REPAIR      KNEE ARTHROSCOPY           CURRENT MEDICATIONS       Discharge Medication List as of 8/8/2022 10:58 PM        CONTINUE these medications which have NOT CHANGED    Details   predniSONE (DELTASONE) 10 MG tablet Take 2 tablets by mouth in the morning for 5 doses. , Disp-10 tablet, R-0Normal      ciprofloxacin (CIPRO) 500 MG tablet Take 1 tablet by mouth in the morning and 1 tablet before bedtime. Do all this for 7 days. , Disp-14 tablet, R-0Normal      lansoprazole (PREVACID) 30 MG delayed release capsule Take 1 capsule by mouth daily, Disp-30 capsule, R-0Normal      triamcinolone (KENALOG) 0.1 % cream Apply topically 2 times daily, Topical, 2 TIMES DAILY Starting Thu 3/31/2022, Disp-60 g, R-0, Normal      ascorbic acid (VITAMIN C) 500 MG tablet Take 1 tablet by mouth 2 times daily for 7 days, Disp-14 tablet, R-0Normal      zinc sulfate (ZINCATE) 220 (50 Zn) MG capsule Take 1 capsule by mouth daily for 7 days, Disp-7 capsule, R-0Normal      dicyclomine (BENTYL) 10 MG capsule Take 10 mg by mouth 3 times daily as neededHistorical Med      gabapentin (NEURONTIN) 300 MG capsule Take 300 mg by mouth 3 times daily as needed. Historical Med      losartan-hydroCHLOROthiazide (HYZAAR) 100-12.5 MG per tablet Take 1 tablet by mouth dailyHistorical Med      meloxicam (MOBIC) 15 MG tablet Take 15 mg by mouth dailyHistorical Med      hydroCHLOROthiazide (MICROZIDE) 12.5 MG capsule Take 12.5 mg by mouth dailyHistorical Med      HYDROcodone-acetaminophen (NORCO) 5-325 MG per tablet Historical Med      albuterol sulfate HFA (PROVENTIL HFA) 108 (90 Base) MCG/ACT inhaler Inhale 2 puffs into the lungs every 4 hours as needed for Wheezing or Shortness of Breath (Space out to every 6 hours as symptoms improve) Space out to every 6 hours as symptoms improve., Disp-1 each, R-0Print      ibuprofen (IBU) 800 MG tablet Take 1 tablet by mouth every 8 hours as needed for Pain., Disp-20 tablet, R-0      naproxen (NAPROSYN) 500 MG tablet Take 1 tablet by mouth 2 times daily for 10 days. , Disp-20 tablet, R-0                  Azithromycin, Ambien [zolpidem tartrate], Codeine, Lidocaine hcl, Procaine hcl, Aloe, and Tramadol    FAMILY HISTORY     No family history on file. SOCIAL HISTORY       Social History     Socioeconomic History    Marital status:    Tobacco Use    Smoking status: Some Days     Types: Pipe, Cigars    Smokeless tobacco: Never   Vaping Use    Vaping Use: Never used   Substance and Sexual Activity    Alcohol use:  Yes     Alcohol/week: 0.0 standard drinks     Comment: occasional    Drug use: No       SCREENINGS    Dawson Coma Scale  Eye Opening: Spontaneous  Best Verbal Response: Oriented  Best Motor Response: Obeys commands  Dawson Coma Scale Score: 15        PHYSICAL EXAM    (up to 7 for level 4, 8 or more for level 5)     ED Triage Vitals [08/08/22 2026]   BP Temp Temp Source Heart Rate Resp SpO2 Height Weight   (!) 166/109 98.4 °F (36.9 °C) Oral 95 14 97 % 5' 3\" (1.6 m) 225 lb (102.1 kg)       Physical Exam  Vitals reviewed. Constitutional:       General: He is not in acute distress. Appearance: He is well-developed. HENT:      Head: Normocephalic and atraumatic. Ears:      Comments: Tenderness in bilateral ear canals. Visualized tympanic membranes appear to be intact without erythema or bulging. Eyes:      Conjunctiva/sclera: Conjunctivae normal.   Neck:      Trachea: No tracheal deviation. Cardiovascular:      Rate and Rhythm: Normal rate and regular rhythm. Pulmonary:      Effort: Pulmonary effort is normal.      Breath sounds: Normal breath sounds. No wheezing or rales. Abdominal:      General: There is no distension. Palpations: Abdomen is soft. Tenderness: There is no abdominal tenderness. Musculoskeletal:         General: No deformity. Normal range of motion. Cervical back: Normal range of motion. Skin:     General: Skin is warm and dry. Neurological:      Mental Status: He is alert and oriented to person, place, and time. RESULTS     EKG: All EKG's are interpreted by the Emergency Department Physician who either signs or Co-signsthis chart in the absence of a cardiologist.    EKG demonstrates a sinus rhythm ventricular to many 6 bpm.  SD interval and QTc interval within normal limits. Normal axis. There are no significant ST elevations or depressions EKG is nondiagnostic for ACS. Radha Mane Compared EKG from 5/30/2021 impression significant change.      RADIOLOGY:   Non-plain filmimages such as CT, Ultrasound and MRI are read by the radiologist. Plain radiographic images are visualized and preliminarily interpreted by the emergency physician with the below findings:      Interpretation per the Radiologist below, if available at the time ofthis note:    XR CHEST (2 VW)   Final Result   No acute abnormality               ED BEDSIDE ULTRASOUND:   Performed by ED Physician - none    LABS:  Labs Reviewed   CBC WITH AUTO DIFFERENTIAL - Abnormal; Notable for the following components:       Result Value    Neutrophils Absolute 9.1 (*)     Anisocytosis Occasional (*)     Macrocytes Occasional (*)     Polychromasia Occasional (*)     Poikilocytes Occasional (*)     All other components within normal limits   COMPREHENSIVE METABOLIC PANEL W/ REFLEX TO MG FOR LOW K - Abnormal; Notable for the following components:    Sodium 133 (*)     Chloride 96 (*)     Glucose 353 (*)     Creatinine 0.8 (*)      (*)     All other components within normal limits   TROPONIN       All other labs were within normal range or not returned as of this dictation. EMERGENCY DEPARTMENT COURSE and DIFFERENTIAL DIAGNOSIS/MDM:   Vitals:    Vitals:    08/08/22 2026 08/08/22 2215 08/08/22 2258   BP: (!) 166/109 (!) 167/97 (!) 167/101   Pulse: 95 78 78   Resp: 14 18 18   Temp: 98.4 °F (36.9 °C)     TempSrc: Oral     SpO2: 97% 97% 98%   Weight: 225 lb (102.1 kg)     Height: 5' 3\" (1.6 m)         Patient was given thefollowing medications:  Medications   ketorolac (TORADOL) injection 15 mg (15 mg IntraMUSCular Given 8/8/22 2253)       ED COURSE & MEDICAL DECISION MAKING    Pertinent Labs & Imaging studies reviewed. (See chart for details)   -  Patient seen and evaluated in the emergency department. -  Triage and nursing notes reviewed and incorporated. -  Old chart records reviewed and incorporated.   -  Differential diagnosis includes: Differential Diagnosis: Acute bronchitis, Musculoskeletal chest pain, Pleurisy, Pulmonary edema, Congestive Heart Failure, Myocardial Infarction, Pulmonary Embolus, Thoracic Dissection, Pericarditis, Pericardial Effusion, Pneumonia, Pneumothorax, Boerhaave's syndrome, Ischemic Bowel, Bowel Obstruction, As needed      DISCHARGEMEDICATIONS:  Discharge Medication List as of 8/8/2022 10:58 PM        START taking these medications    Details   clindamycin (CLEOCIN) 300 MG capsule Take 1 capsule by mouth in the morning and 1 capsule at noon and 1 capsule in the evening and 1 capsule before bedtime. Do all this for 7 days. , Disp-28 capsule, R-0Normal      acetic acid (VOSOL) 2 % otic solution Place 4 drops into both ears in the morning and 4 drops at noon and 4 drops before bedtime. Do all this for 7 days. , Disp-15 mL, R-0Normal      tiZANidine (ZANAFLEX) 4 MG tablet Take 1 tablet by mouth nightly as needed (muscle aches), Disp-10 tablet, R-0Normal                (Please note that portions of this note were completed with a voice recognition program.  Efforts were made to edit the dictations but occasionally words are mis-transcribed.)    Robbie Mcdonough MD (electronically signed)  Attending Emergency Physician          Robbie Mcdonough MD  08/10/22 [id number]

## 2023-01-06 ENCOUNTER — HOSPITAL ENCOUNTER (EMERGENCY)
Age: 43
Discharge: HOME | End: 2023-01-06
Attending: STUDENT IN AN ORGANIZED HEALTH CARE EDUCATION/TRAINING PROGRAM
Payer: MEDICAID

## 2023-01-06 ENCOUNTER — APPOINTMENT (OUTPATIENT)
Dept: GENERAL RADIOLOGY | Age: 43
End: 2023-01-06
Payer: MEDICAID

## 2023-01-06 VITALS
HEIGHT: 63 IN | HEART RATE: 77 BPM | RESPIRATION RATE: 18 BRPM | WEIGHT: 220 LBS | TEMPERATURE: 98.5 F | OXYGEN SATURATION: 98 % | SYSTOLIC BLOOD PRESSURE: 138 MMHG | BODY MASS INDEX: 38.98 KG/M2 | DIASTOLIC BLOOD PRESSURE: 87 MMHG

## 2023-01-06 DIAGNOSIS — R07.9 CHEST PAIN, UNSPECIFIED TYPE: Primary | ICD-10-CM

## 2023-01-06 LAB
ANION GAP SERPL CALCULATED.3IONS-SCNC: 10 MMOL/L (ref 3–16)
BASOPHILS ABSOLUTE: 0 K/UL (ref 0–0.2)
BASOPHILS RELATIVE PERCENT: 0.7 %
BUN BLDV-MCNC: 19 MG/DL (ref 7–20)
CALCIUM SERPL-MCNC: 9.5 MG/DL (ref 8.3–10.6)
CHLORIDE BLD-SCNC: 104 MMOL/L (ref 99–110)
CO2: 27 MMOL/L (ref 21–32)
CREAT SERPL-MCNC: 0.7 MG/DL (ref 0.9–1.3)
EKG ATRIAL RATE: 90 BPM
EKG DIAGNOSIS: NORMAL
EKG P AXIS: 36 DEGREES
EKG P-R INTERVAL: 158 MS
EKG Q-T INTERVAL: 344 MS
EKG QRS DURATION: 96 MS
EKG QTC CALCULATION (BAZETT): 420 MS
EKG R AXIS: 18 DEGREES
EKG T AXIS: 17 DEGREES
EKG VENTRICULAR RATE: 90 BPM
EOSINOPHILS ABSOLUTE: 0.2 K/UL (ref 0–0.6)
EOSINOPHILS RELATIVE PERCENT: 3.4 %
GFR SERPL CREATININE-BSD FRML MDRD: >60 ML/MIN/{1.73_M2}
GLUCOSE BLD-MCNC: 150 MG/DL (ref 70–99)
HCT VFR BLD CALC: 42 % (ref 40.5–52.5)
HEMOGLOBIN: 14.4 G/DL (ref 13.5–17.5)
LYMPHOCYTES ABSOLUTE: 1.3 K/UL (ref 1–5.1)
LYMPHOCYTES RELATIVE PERCENT: 21.3 %
MCH RBC QN AUTO: 29.3 PG (ref 26–34)
MCHC RBC AUTO-ENTMCNC: 34.2 G/DL (ref 31–36)
MCV RBC AUTO: 85.7 FL (ref 80–100)
MONOCYTES ABSOLUTE: 0.3 K/UL (ref 0–1.3)
MONOCYTES RELATIVE PERCENT: 4.5 %
NEUTROPHILS ABSOLUTE: 4.3 K/UL (ref 1.7–7.7)
NEUTROPHILS RELATIVE PERCENT: 70.1 %
PDW BLD-RTO: 13.3 % (ref 12.4–15.4)
PLATELET # BLD: 315 K/UL (ref 135–450)
PMV BLD AUTO: 8.3 FL (ref 5–10.5)
POTASSIUM SERPL-SCNC: 3.6 MMOL/L (ref 3.5–5.1)
RBC # BLD: 4.9 M/UL (ref 4.2–5.9)
SODIUM BLD-SCNC: 141 MMOL/L (ref 136–145)
TROPONIN: <0.01 NG/ML
TROPONIN: <0.01 NG/ML
WBC # BLD: 6.1 K/UL (ref 4–11)

## 2023-01-06 PROCEDURE — 6360000002 HC RX W HCPCS: Performed by: REGISTERED NURSE

## 2023-01-06 PROCEDURE — 85025 COMPLETE CBC W/AUTO DIFF WBC: CPT

## 2023-01-06 PROCEDURE — 93005 ELECTROCARDIOGRAM TRACING: CPT | Performed by: STUDENT IN AN ORGANIZED HEALTH CARE EDUCATION/TRAINING PROGRAM

## 2023-01-06 PROCEDURE — 84484 ASSAY OF TROPONIN QUANT: CPT

## 2023-01-06 PROCEDURE — 99285 EMERGENCY DEPT VISIT HI MDM: CPT

## 2023-01-06 PROCEDURE — 36415 COLL VENOUS BLD VENIPUNCTURE: CPT

## 2023-01-06 PROCEDURE — 71046 X-RAY EXAM CHEST 2 VIEWS: CPT

## 2023-01-06 PROCEDURE — 96372 THER/PROPH/DIAG INJ SC/IM: CPT

## 2023-01-06 PROCEDURE — 80048 BASIC METABOLIC PNL TOTAL CA: CPT

## 2023-01-06 RX ORDER — ATORVASTATIN CALCIUM 20 MG/1
TABLET, FILM COATED ORAL
COMMUNITY
Start: 2022-11-21

## 2023-01-06 RX ORDER — MORPHINE SULFATE 4 MG/ML
4 INJECTION, SOLUTION INTRAMUSCULAR; INTRAVENOUS EVERY 4 HOURS PRN
Status: DISCONTINUED | OUTPATIENT
Start: 2023-01-06 | End: 2023-01-06

## 2023-01-06 RX ORDER — KETOROLAC TROMETHAMINE 30 MG/ML
15 INJECTION, SOLUTION INTRAMUSCULAR; INTRAVENOUS ONCE
Status: COMPLETED | OUTPATIENT
Start: 2023-01-06 | End: 2023-01-06

## 2023-01-06 RX ORDER — ONDANSETRON 2 MG/ML
4 INJECTION INTRAMUSCULAR; INTRAVENOUS ONCE
Status: DISCONTINUED | OUTPATIENT
Start: 2023-01-06 | End: 2023-01-06 | Stop reason: HOSPADM

## 2023-01-06 RX ORDER — KETOROLAC TROMETHAMINE 30 MG/ML
15 INJECTION, SOLUTION INTRAMUSCULAR; INTRAVENOUS ONCE
Status: DISCONTINUED | OUTPATIENT
Start: 2023-01-06 | End: 2023-01-06

## 2023-01-06 RX ADMIN — KETOROLAC TROMETHAMINE 15 MG: 30 INJECTION, SOLUTION INTRAMUSCULAR; INTRAVENOUS at 17:03

## 2023-01-06 ASSESSMENT — PAIN DESCRIPTION - DESCRIPTORS
DESCRIPTORS: STABBING
DESCRIPTORS: STABBING

## 2023-01-06 ASSESSMENT — ENCOUNTER SYMPTOMS
COUGH: 0
CONSTIPATION: 0
RHINORRHEA: 0
SHORTNESS OF BREATH: 0
BACK PAIN: 0
ABDOMINAL PAIN: 0
NAUSEA: 0
DIARRHEA: 0
EYE PAIN: 0
CHEST TIGHTNESS: 0
VOMITING: 0
SORE THROAT: 0
PHOTOPHOBIA: 0

## 2023-01-06 ASSESSMENT — PAIN DESCRIPTION - PAIN TYPE
TYPE: ACUTE PAIN
TYPE: ACUTE PAIN

## 2023-01-06 ASSESSMENT — PAIN - FUNCTIONAL ASSESSMENT
PAIN_FUNCTIONAL_ASSESSMENT: 0-10
PAIN_FUNCTIONAL_ASSESSMENT: 0-10

## 2023-01-06 ASSESSMENT — PAIN DESCRIPTION - ORIENTATION
ORIENTATION: LEFT
ORIENTATION: LEFT

## 2023-01-06 ASSESSMENT — PAIN DESCRIPTION - LOCATION
LOCATION: CHEST
LOCATION: CHEST

## 2023-01-06 ASSESSMENT — HEART SCORE: ECG: 0

## 2023-01-06 ASSESSMENT — PAIN SCALES - GENERAL
PAINLEVEL_OUTOF10: 10
PAINLEVEL_OUTOF10: 9
PAINLEVEL_OUTOF10: 9

## 2023-01-06 NOTE — ED PROVIDER NOTES
[address] ED  EMERGENCY DEPARTMENT ENCOUNTER        Pt Name: Lai Watts  MRN: [id number]  [place] 1980  Date of evaluation: 1/6/2023  Provider: FLORES Alvarado CNP  PCP: No primary care provider on file. This patient was seen and evaluated by the attending physician Angelica Jang MD.    I have evaluated this patient. My supervising physician was available for consultation. CHIEF COMPLAINT       Chief Complaint   Patient presents with    Chest Pain     Chest pain x1 year. States it is constant, never goes away but sometimes increases. States this am it increased and has not come back down. States he's been told it is not is heart, anxiety, or skeletal.        HISTORY OF PRESENT ILLNESS   (Location/Symptom, Timing/Onset, Context/Setting, Quality, Duration, Modifying Factors, Severity)  Note limiting factors. Lai Watts is a 36 y.o. male who presents via private car for  left sided chest pain. Onset was chronic, worse over the last 24 hours. Duration has been since the onset. Context includes she presents to the emergency department today stating that he has quite a chronic history of left-sided chest pain that has been persistent for greater than 1 year. He has been evaluated by cardiology, pulmonary medicine and a neuromuscular specialist per his report. He had a stress test with angio in May that was negative. He does have a follow-up appointment scheduled with his PCP on Wednesday for this pain but states it has seemed worse today and is not going away when it generally does resolve. He denies any palpitations or swelling in his extremities. He denies any shortness of breath, cough congestion or fevers. He denies any abdominal pain, nausea vomiting, diarrhea or constipation. He denies any urinary symptoms including dysuria, urgency, frequency, hematuria or pain.   He denies the chest pain is exertional. Quality is dull and aching with radiation to his left chest. Alleviating factors include nothing. Aggravating factors include nothing. Pain is 10/10. Nothing has been used for pain today. Chart review reveals pt has significant PMHx of obesity, migraine, hypertension, hyperlipidemia. They take atorvastatin, bentyl, microzide, albuterol  . Nursing Notes were all reviewed and agreed with or any disagreements were addressed  in the HPI. Pt was seen during the [place] 19 pandemic. Appropriate PPE worn by ME during patient encounters. Pt seen during a time with constrained hospital bed capacity and other potential inpatient and outpatient resources were constrained due to the viral pandemic. REVIEW OF SYSTEMS    (2-9 systems for level 4, 10 or more for level 5)     Review of Systems   Constitutional:  Negative for chills, diaphoresis and fever. HENT:  Negative for congestion, rhinorrhea and sore throat. Eyes:  Negative for photophobia, pain and visual disturbance. Respiratory:  Negative for cough, chest tightness and shortness of breath. Cardiovascular:  Positive for chest pain. Negative for palpitations and leg swelling. Gastrointestinal:  Negative for abdominal pain, constipation, diarrhea, nausea and vomiting. Genitourinary:  Negative for dysuria, flank pain, frequency, hematuria and urgency. Musculoskeletal:  Negative for back pain and neck pain. Skin:  Negative for rash and wound. Neurological:  Negative for dizziness, light-headedness and headaches. Positives and Pertinent negatives as per HPI. Except as noted abovein the ROS, all other systems were reviewed and negative.        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         SURGICAL HISTORY     Past Surgical History:   Procedure Laterality Date    FOOT SURGERY      HAND SURGERY      HERNIA REPAIR      KNEE ARTHROSCOPY           CURRENTMEDICATIONS Discharge Medication List as of 1/6/2023  6:54 PM        CONTINUE these medications which have NOT CHANGED    Details   atorvastatin (LIPITOR) 20 MG tablet Historical Med      dicyclomine (BENTYL) 10 MG capsule Take 10 mg by mouth 3 times daily as neededHistorical Med      hydroCHLOROthiazide (MICROZIDE) 12.5 MG capsule Take 12.5 mg by mouth dailyHistorical Med      albuterol sulfate HFA (PROVENTIL HFA) 108 (90 Base) MCG/ACT inhaler Inhale 2 puffs into the lungs every 4 hours as needed for Wheezing or Shortness of Breath (Space out to every 6 hours as symptoms improve) Space out to every 6 hours as symptoms improve., Disp-1 each, R-0Print               ALLERGIES     Azithromycin, Ambien [zolpidem tartrate], Codeine, Lidocaine hcl, Procaine hcl, Aloe, and Tramadol    FAMILYHISTORY     History reviewed. No pertinent family history. SOCIAL HISTORY       Social History     Socioeconomic History    Marital status:      Spouse name: None    Number of children: None    Years of education: None    Highest education level: None   Tobacco Use    Smoking status: Some Days     Types: Pipe, Cigars    Smokeless tobacco: Never   Vaping Use    Vaping Use: Never used   Substance and Sexual Activity    Alcohol use: Yes     Alcohol/week: 0.0 standard drinks     Comment: occasional    Drug use: No       SCREENINGS    [place] Coma Scale  Eye Opening: Spontaneous  Best Verbal Response: Oriented  Best Motor Response: Obeys commands  Ladora Coma Scale Score: 15 Heart Score for chest pain patients  History:  Moderately Suspicious  ECG: Normal  Patient Age: < 39 years  *Risk factors for Atherosclerotic disease: Obesity, Cigarette smoking, Hypercholesterolemia, Hypertension  Risk Factors: > 3 Risk factors or history of atherosclerotic disease*  Troponin: < 1X normal limit  Heart Score Total: 3      PHYSICAL EXAM    (up to 7 for level 4, 8 or more for level 5)     ED Triage Vitals [01/06/23 1345]   BP Temp Temp Source Heart Rate Resp SpO2 Height Weight   (!) 146/93 98.5 °F (36.9 °C) Oral 90 16 97 % 5' 3\" (1.6 m) 220 lb (99.8 kg)       Physical Exam  Vitals and nursing note reviewed. Constitutional:       Appearance: Normal appearance. He is not ill-appearing or diaphoretic. HENT:      Head: Normocephalic and atraumatic. Right Ear: External ear normal.      Left Ear: External ear normal.   Eyes:      General:         Right eye: No discharge. Left eye: No discharge. Cardiovascular:      Rate and Rhythm: Normal rate and regular rhythm. Pulses: Normal pulses. Radial pulses are 2+ on the right side and 2+ on the left side. Heart sounds: Normal heart sounds. No murmur heard. No friction rub. No gallop. Pulmonary:      Effort: Pulmonary effort is normal. No respiratory distress. Breath sounds: Normal breath sounds. No stridor. No wheezing, rhonchi or rales. Chest:      Chest wall: No tenderness. Abdominal:      General: Abdomen is flat. Bowel sounds are normal.      Palpations: Abdomen is soft. Musculoskeletal:         General: Normal range of motion. Cervical back: Normal range of motion and neck supple. Right lower leg: No edema. Left lower leg: No edema. Skin:     General: Skin is warm and dry. Capillary Refill: Capillary refill takes less than 2 seconds. Neurological:      General: No focal deficit present. Mental Status: He is alert and oriented to person, place, and time. GCS: GCS eye subscore is 4. GCS verbal subscore is 5. GCS motor subscore is 6.    Psychiatric:         Mood and Affect: Mood normal.         Behavior: Behavior normal.     PHYSICAL EXAM  /87   Pulse 77   Temp 98.5 °F (36.9 °C) (Oral)   Resp 18   Ht 5' 3\" (1.6 m)   Wt 220 lb (99.8 kg)   SpO2 98%   BMI 38.97 kg/m²       DIAGNOSTIC RESULTS   LABS:    Labs Reviewed   BASIC METABOLIC PANEL - Abnormal; Notable for the following components:       Result Value    Glucose 150 (*)     Creatinine 0.7 (*)     All other components within normal limits   TROPONIN   CBC WITH AUTO DIFFERENTIAL   TROPONIN       All other labs were within normal range or not returned as of this dictation. EKG: All EKG's are interpreted by the Emergency Department Physician who either signs orCo-signs this chart in the absence of a cardiologist.  Please see their note for interpretation of EKG.       RADIOLOGY:   Non-plain film images such as CT, Ultrasound and MRI are read by the radiologist. Patricia Rodriguez radiographic images are visualized andpreliminarily interpreted by the  ED Provider with the below findings:        Interpretation perthe Radiologist below, if available at the time of this note:    XR CHEST (2 VW)   Final Result   No acute disease           XR CHEST (2 VW)    Result Date: 1/6/2023  EXAMINATION: TWO XRAY VIEWS OF THE CHEST 1/6/2023 2:15 pm COMPARISON: 08/08/2022 HISTORY: ORDERING SYSTEM PROVIDED HISTORY: chest pain TECHNOLOGIST PROVIDED HISTORY: Reason for exam:->chest pain Reason for Exam: CHEST PAIN X1 YEAR FINDINGS: Heart size is normal.  Mediastinal contours are normal.  Pulmonary vascularity is normal.  No focal lung consolidation noted     No acute disease          PROCEDURES   Unless otherwise noted below, none     Procedures    CRITICAL CARE TIME   N/A    CONSULTS:  None      EMERGENCY DEPARTMENT COURSE and DIFFERENTIALDIAGNOSIS/MDM:   Vitals:    Vitals:    01/06/23 1345 01/06/23 1644 01/06/23 1800   BP: (!) 146/93 (!) 139/90 138/87   Pulse: 90 79 77   Resp: 16 18 18   Temp: 98.5 °F (36.9 °C)     TempSrc: Oral     SpO2: 97% 97% 98%   Weight: 220 lb (99.8 kg)     Height: 5' 3\" (1.6 m)         Patient was given thefollowing medications:  Medications   ondansetron (ZOFRAN) injection 4 mg (0 mg IntraVENous Held 1/6/23 1645)   ketorolac (TORADOL) injection 15 mg (15 mg IntraMUSCular Given 1/6/23 1703)       PDMP Monitoring:    Last PDMP Antonio as Reviewed [facility]):  Review User Review Instant Review Result            Urine Drug Screenings (1 yr)       Drug screen multi urine  Collected: 12/16/2021 11:50 PM (Final result)   Narrative: Performed at:  [facility]) [facility]  [address],  [place], [place]   Phone [phone]                 Medication Contract and Consent for Opioid Use Documents Filed        No documents found                    MDM:   This patient was seen and evaluated by myself and my attending physician. He presents to the emergency department today with complaints of left-sided chest pain. This has been quite a chronic issue for the patient and he has been evaluated by several specialist.  Relevant medical records were reviewed revealing a angio heart cath that was completed 5/22 that was negative, he was evaluated in November at [facility] and had a CT angiogram to rule out a pulmonary embolism that was also negative. He states his pain has been persistent daily since the onset and he is not sure what is causing it. He does have a primary care physician and states that he has an appointment with them on Wednesday of next week to evaluate this pain. On exam he is alert and oriented, hemodynamically stable and nontoxic in appearance. He will have a work-up in the emergency department consisting of laboratory studies and imaging. Initial orders placed for morphine for pain medication and patient did decline, subsequent order placed for IV Toradol to be given for pain. Initial troponin is negative, delta troponin also negative. BMP is negative for electrolyte dyscrasias. CBC is negative for leukocytosis or anemias. Chest x-ray interpreted by the radiologist for no acute disease, heart size is normal, mediastinal contours are normal, pulmonary vascularity is normal with no focal lung consolidation. .  EKG interpreted by the attending physician.   Does have a heart score of 3  I utilized an evidence-based risk rating tool (CMT) along with my training and experience to weigh the risk of discharge against the risks of further testing, imaging, or hospitalization. At this time I estimate the risks of additional testing, imaging, or hospitalization to be equal to or greater than the risk of discharge. I discussed my risk assessment with the patient and the patient consents to the risk of discharge as well as the risk of uncertainty in estimating outcomes. I did have a very long conversation with the patient regarding disposition of admission versus discharge and he was not agreeable to an admission. I discussed with him a plan for discharge including following up for an outpatient stress test and he was agreeable to this. I did review our CMT tool with him regarding chest pain and he was agreeable to the risks of going home. A order was placed for an outpatient stress test and patient was provided with contact information to follow-up with them. He was provided with strict return precautions for the emergency department including but not limited to worsening pain, shortness of breath, headaches, dizziness or lightheadedness or other concerns. Patient verbalized understanding of all discharge teaching and he was ultimately discharged in a stable condition with all questions answered. The patient's HEART Score is <4. In rare cases, I give patients with HEART Score of 4 the option of discharge, but only when they meet criteria for \"Low 4,\" meaning that HST was used, and the 4 is not from a highly suspicious story, highly suspicious EKG, or positive cardiac enzymes. In these selected cases, the risk of a \"Low 4\" is still most likely lower than the risk of admission and further testing/imaging. Is this patient to be included in the SEP-1 Core Measure due to severe sepsis or septic shock? No   Exclusion criteria - the patient is NOT to be included for SEP-1 Core Measure due to:   Infection is not suspected    Discharge Time out:  CC Reviewed Yes   Test Results Yes     Vitals:    01/06/23 1800   BP: 138/87   Pulse: 77   Resp: 18   Temp:    SpO2: 98%              FINAL IMPRESSION      1.  Chest pain, unspecified type          DISPOSITION/PLAN   DISPOSITION Decision To Discharge 01/06/2023 06:34:16 PM      PATIENT REFERREDTO:  [facility]) Pre-Services  [phone]        DISCHARGE MEDICATIONS:  Discharge Medication List as of 1/6/2023  6:54 PM          DISCONTINUED MEDICATIONS:  Discharge Medication List as of 1/6/2023  6:54 PM        STOP taking these medications       tiZANidine (ZANAFLEX) 4 MG tablet Comments:   Reason for Stopping:         lansoprazole (PREVACID) 30 MG delayed release capsule Comments:   Reason for Stopping:         triamcinolone (KENALOG) 0.1 % cream Comments:   Reason for Stopping:         ascorbic acid (VITAMIN C) 500 MG tablet Comments:   Reason for Stopping:         zinc sulfate (ZINCATE) 220 (50 Zn) MG capsule Comments:   Reason for Stopping:         gabapentin (NEURONTIN) 300 MG capsule Comments:   Reason for Stopping:         losartan-hydroCHLOROthiazide (HYZAAR) 100-12.5 MG per tablet Comments:   Reason for Stopping:         meloxicam (MOBIC) 15 MG tablet Comments:   Reason for Stopping:         HYDROcodone-acetaminophen ([address]) 5-325 MG per tablet Comments:   Reason for Stopping:         ibuprofen (IBU) 800 MG tablet Comments:   Reason for Stopping:         naproxen (NAPROSYN) 500 MG tablet Comments:   Reason for Stopping:                      (Please note that portions ofthis note were completed with a voice recognition program.  Efforts were made to edit the dictations but occasionally words are mis-transcribed.)    FLORES Curtis CNP (electronically signed)       FLORES Curtis CNP  01/06/23 2035

## 2023-01-06 NOTE — DISCHARGE INSTRUCTIONS
There was an order placed for an outpatient stress test, please Lizeth Allen to facilitate getting this scheduled, it should be done within the next 72 hours. If your chest pain changes, worsens or you develop other concerning symptoms please return to the emergency department. You were also provided with a referral for a primary care physician, please call to establish care.

## 2023-04-28 ENCOUNTER — APPOINTMENT (OUTPATIENT)
Dept: CT IMAGING | Age: 43
End: 2023-04-28
Payer: MEDICAID

## 2023-04-28 ENCOUNTER — HOSPITAL ENCOUNTER (EMERGENCY)
Age: 43
Discharge: HOME | End: 2023-04-28
Attending: EMERGENCY MEDICINE
Payer: MEDICAID

## 2023-04-28 VITALS
DIASTOLIC BLOOD PRESSURE: 51 MMHG | HEART RATE: 76 BPM | WEIGHT: 220 LBS | SYSTOLIC BLOOD PRESSURE: 94 MMHG | OXYGEN SATURATION: 98 % | HEIGHT: 64 IN | RESPIRATION RATE: 16 BRPM | TEMPERATURE: 98.1 F | BODY MASS INDEX: 37.56 KG/M2

## 2023-04-28 DIAGNOSIS — R10.9 ABDOMINAL PAIN, UNSPECIFIED ABDOMINAL LOCATION: ICD-10-CM

## 2023-04-28 DIAGNOSIS — K42.9 UMBILICAL HERNIA WITHOUT OBSTRUCTION AND WITHOUT GANGRENE: Primary | ICD-10-CM

## 2023-04-28 LAB
ALBUMIN SERPL-MCNC: 4.8 G/DL (ref 3.4–5)
ALBUMIN/GLOB SERPL: 2.2 {RATIO} (ref 1.1–2.2)
ALP SERPL-CCNC: 74 U/L (ref 40–129)
ALT SERPL-CCNC: 48 U/L (ref 10–40)
ANION GAP SERPL CALCULATED.3IONS-SCNC: 11 MMOL/L (ref 3–16)
AST SERPL-CCNC: 27 U/L (ref 15–37)
BASOPHILS # BLD: 0.1 K/UL (ref 0–0.2)
BASOPHILS NFR BLD: 0.8 %
BILIRUB SERPL-MCNC: 1.8 MG/DL (ref 0–1)
BUN SERPL-MCNC: 14 MG/DL (ref 7–20)
CALCIUM SERPL-MCNC: 9.5 MG/DL (ref 8.3–10.6)
CHLORIDE SERPL-SCNC: 100 MMOL/L (ref 99–110)
CO2 SERPL-SCNC: 27 MMOL/L (ref 21–32)
CREAT SERPL-MCNC: 0.8 MG/DL (ref 0.9–1.3)
DEPRECATED RDW RBC AUTO: 12.7 % (ref 12.4–15.4)
EOSINOPHIL # BLD: 0.4 K/UL (ref 0–0.6)
EOSINOPHIL NFR BLD: 4.6 %
GFR SERPLBLD CREATININE-BSD FMLA CKD-EPI: >60 ML/MIN/{1.73_M2}
GLUCOSE SERPL-MCNC: 119 MG/DL (ref 70–99)
HCT VFR BLD AUTO: 41.3 % (ref 40.5–52.5)
HGB BLD-MCNC: 14.1 G/DL (ref 13.5–17.5)
LIPASE SERPL-CCNC: 55 U/L (ref 13–60)
LYMPHOCYTES # BLD: 1.8 K/UL (ref 1–5.1)
LYMPHOCYTES NFR BLD: 23.2 %
MCH RBC QN AUTO: 29.1 PG (ref 26–34)
MCHC RBC AUTO-ENTMCNC: 34.1 G/DL (ref 31–36)
MCV RBC AUTO: 85.3 FL (ref 80–100)
MONOCYTES # BLD: 0.5 K/UL (ref 0–1.3)
MONOCYTES NFR BLD: 6.1 %
NEUTROPHILS # BLD: 5 K/UL (ref 1.7–7.7)
NEUTROPHILS NFR BLD: 65.3 %
PLATELET # BLD AUTO: 345 K/UL (ref 135–450)
PMV BLD AUTO: 8.2 FL (ref 5–10.5)
POTASSIUM SERPL-SCNC: 3.6 MMOL/L (ref 3.5–5.1)
PROT SERPL-MCNC: 7 G/DL (ref 6.4–8.2)
RBC # BLD AUTO: 4.84 M/UL (ref 4.2–5.9)
SODIUM SERPL-SCNC: 138 MMOL/L (ref 136–145)
WBC # BLD AUTO: 7.6 K/UL (ref 4–11)

## 2023-04-28 PROCEDURE — 6360000004 HC RX CONTRAST MEDICATION: Performed by: NURSE PRACTITIONER

## 2023-04-28 PROCEDURE — 99285 EMERGENCY DEPT VISIT HI MDM: CPT

## 2023-04-28 PROCEDURE — 96374 THER/PROPH/DIAG INJ IV PUSH: CPT

## 2023-04-28 PROCEDURE — 83690 ASSAY OF LIPASE: CPT

## 2023-04-28 PROCEDURE — 2580000003 HC RX 258: Performed by: NURSE PRACTITIONER

## 2023-04-28 PROCEDURE — 74177 CT ABD & PELVIS W/CONTRAST: CPT

## 2023-04-28 PROCEDURE — 80053 COMPREHEN METABOLIC PANEL: CPT

## 2023-04-28 PROCEDURE — 85025 COMPLETE CBC W/AUTO DIFF WBC: CPT

## 2023-04-28 PROCEDURE — 6360000002 HC RX W HCPCS: Performed by: NURSE PRACTITIONER

## 2023-04-28 RX ORDER — 0.9 % SODIUM CHLORIDE 0.9 %
1000 INTRAVENOUS SOLUTION INTRAVENOUS ONCE
Status: COMPLETED | OUTPATIENT
Start: 2023-04-28 | End: 2023-04-28

## 2023-04-28 RX ORDER — MORPHINE SULFATE 4 MG/ML
4 INJECTION, SOLUTION INTRAMUSCULAR; INTRAVENOUS ONCE
Status: COMPLETED | OUTPATIENT
Start: 2023-04-28 | End: 2023-04-28

## 2023-04-28 RX ADMIN — SODIUM CHLORIDE 1000 ML: 9 INJECTION, SOLUTION INTRAVENOUS at 19:21

## 2023-04-28 RX ADMIN — MORPHINE SULFATE 4 MG: 4 INJECTION, SOLUTION INTRAMUSCULAR; INTRAVENOUS at 19:21

## 2023-04-28 RX ADMIN — IOPAMIDOL 75 ML: 755 INJECTION, SOLUTION INTRAVENOUS at 20:06

## 2023-04-28 ASSESSMENT — PAIN - FUNCTIONAL ASSESSMENT
PAIN_FUNCTIONAL_ASSESSMENT: 0-10
PAIN_FUNCTIONAL_ASSESSMENT: 0-10

## 2023-04-28 ASSESSMENT — PAIN SCALES - GENERAL
PAINLEVEL_OUTOF10: 4
PAINLEVEL_OUTOF10: 9
PAINLEVEL_OUTOF10: 8

## 2023-04-28 ASSESSMENT — PAIN DESCRIPTION - LOCATION
LOCATION: ABDOMEN
LOCATION: ABDOMEN

## 2023-04-28 ASSESSMENT — PAIN DESCRIPTION - PAIN TYPE: TYPE: ACUTE PAIN

## 2023-04-29 NOTE — ED PROVIDER NOTES
I independently performed a history and physical on [address] All diagnostic, treatment, and disposition decisions were made by myself in conjunction with the advanced practice provider. For further details of [facility] emergency department encounter, please see Malik Braswell NP's documentation. Chief Complaint   Patient presents with    Abdominal Pain     Pt reports helping friend with moving boxes last night and picked up heavy box of canned goods and felt ripping sensation in periumbilical area. Pt reports hx of hernia in the past.        Patient complains of periumbilical abdominal pain since yesterday after moving some heavy boxes. On exam he has a tender but reducible umbilical hernia. No other abdominal tenderness, rebound, rigidity or guarding. Labs Reviewed   COMPREHENSIVE METABOLIC PANEL - Abnormal; Notable for the following components:       Result Value    Glucose 119 (*)     Creatinine 0.8 (*)     Total Bilirubin 1.8 (*)     ALT 48 (*)     All other components within normal limits   CBC WITH AUTO DIFFERENTIAL   LIPASE     CT ABDOMEN PELVIS W IV CONTRAST Additional Contrast? None   Final Result   3.4 x 2.3 x 3.7 cm fat containing umbilical hernia with inflammatory changes   of the fat in the hernia sac suggesting a strangulated hernia. Mild hepatic steatosis. No acute bowel abnormality. Normal appendix. I personally saw this patient and performed a substantive portion of the visit including all aspects of the medical decision making.     MEDICAL DECISION MAKING  History From: History from : Patient  Limitations to history : None  ED Medication Orders (From admission, onward)      Start Ordered     Status Ordering Provider    04/28/23 1956 04/28/23 1957  iopamidol (ISOVUE-370) 76 % injection 75 mL  IMG ONCE PRN         Last MAR action: Given - by Rianna Kumar on 04/28/23 at [address] [place] [address]    04/28/23 1915 04/28/23 1913  0.9 % sodium
4/28/2023  9:38 PM        Discontinued Medications:  Discharge Medication List as of 4/28/2023  9:38 PM        Risk management discussed and shared decision making had with patient and/or surrogate. All questions were answered. Patient will follow up with PCP and general surgery for further evaluation/treatment. All questions answered. Patient will return to ED for new/worsening symptoms. DISPOSITION:  Patient was discharged home in stable condition. (Please note that portions of this note were completed with a voice recognition program.  Efforts were made to edit the dictations but occasionally words are mis-transcribed).           FLORES Ricardo - CNP  04/28/23 0740

## 2023-05-09 ENCOUNTER — APPOINTMENT (OUTPATIENT)
Dept: CT IMAGING | Age: 43
End: 2023-05-09
Payer: MEDICAID

## 2023-05-09 ENCOUNTER — HOSPITAL ENCOUNTER (EMERGENCY)
Age: 43
Discharge: HOME | End: 2023-05-09
Attending: EMERGENCY MEDICINE
Payer: MEDICAID

## 2023-05-09 VITALS
OXYGEN SATURATION: 96 % | WEIGHT: 225 LBS | RESPIRATION RATE: 18 BRPM | HEART RATE: 81 BPM | TEMPERATURE: 98.7 F | DIASTOLIC BLOOD PRESSURE: 102 MMHG | HEIGHT: 63 IN | BODY MASS INDEX: 39.87 KG/M2 | SYSTOLIC BLOOD PRESSURE: 160 MMHG

## 2023-05-09 DIAGNOSIS — K42.9 UMBILICAL HERNIA WITHOUT OBSTRUCTION AND WITHOUT GANGRENE: ICD-10-CM

## 2023-05-09 DIAGNOSIS — K59.00 CONSTIPATION, UNSPECIFIED CONSTIPATION TYPE: Primary | ICD-10-CM

## 2023-05-09 LAB
ALBUMIN SERPL-MCNC: 4.8 G/DL (ref 3.4–5)
ALBUMIN/GLOB SERPL: 2.1 {RATIO} (ref 1.1–2.2)
ALP SERPL-CCNC: 75 U/L (ref 40–129)
ALT SERPL-CCNC: 40 U/L (ref 10–40)
ANION GAP SERPL CALCULATED.3IONS-SCNC: 13 MMOL/L (ref 3–16)
AST SERPL-CCNC: 22 U/L (ref 15–37)
BASOPHILS # BLD: 0.1 K/UL (ref 0–0.2)
BASOPHILS NFR BLD: 1.1 %
BILIRUB SERPL-MCNC: 1 MG/DL (ref 0–1)
BILIRUB UR QL STRIP.AUTO: NEGATIVE
BUN SERPL-MCNC: 17 MG/DL (ref 7–20)
CALCIUM SERPL-MCNC: 9.7 MG/DL (ref 8.3–10.6)
CHLORIDE SERPL-SCNC: 102 MMOL/L (ref 99–110)
CLARITY UR: CLEAR
CO2 SERPL-SCNC: 25 MMOL/L (ref 21–32)
COLOR UR: YELLOW
CREAT SERPL-MCNC: 0.9 MG/DL (ref 0.9–1.3)
DEPRECATED RDW RBC AUTO: 12.8 % (ref 12.4–15.4)
EOSINOPHIL # BLD: 0.3 K/UL (ref 0–0.6)
EOSINOPHIL NFR BLD: 4.4 %
GFR SERPLBLD CREATININE-BSD FMLA CKD-EPI: >60 ML/MIN/{1.73_M2}
GLUCOSE SERPL-MCNC: 108 MG/DL (ref 70–99)
GLUCOSE UR STRIP.AUTO-MCNC: NEGATIVE MG/DL
HCT VFR BLD AUTO: 40 % (ref 40.5–52.5)
HGB BLD-MCNC: 13.8 G/DL (ref 13.5–17.5)
HGB UR QL STRIP.AUTO: NEGATIVE
KETONES UR STRIP.AUTO-MCNC: NEGATIVE MG/DL
LEUKOCYTE ESTERASE UR QL STRIP.AUTO: NEGATIVE
LIPASE SERPL-CCNC: 65 U/L (ref 13–60)
LYMPHOCYTES # BLD: 1.8 K/UL (ref 1–5.1)
LYMPHOCYTES NFR BLD: 23.9 %
MCH RBC QN AUTO: 29.2 PG (ref 26–34)
MCHC RBC AUTO-ENTMCNC: 34.4 G/DL (ref 31–36)
MCV RBC AUTO: 84.8 FL (ref 80–100)
MONOCYTES # BLD: 0.5 K/UL (ref 0–1.3)
MONOCYTES NFR BLD: 6.7 %
NEUTROPHILS # BLD: 4.9 K/UL (ref 1.7–7.7)
NEUTROPHILS NFR BLD: 63.9 %
NITRITE UR QL STRIP.AUTO: NEGATIVE
PH UR STRIP.AUTO: 6 [PH] (ref 5–8)
PLATELET # BLD AUTO: 333 K/UL (ref 135–450)
PMV BLD AUTO: 7.8 FL (ref 5–10.5)
POTASSIUM SERPL-SCNC: 3.7 MMOL/L (ref 3.5–5.1)
PROT SERPL-MCNC: 7.1 G/DL (ref 6.4–8.2)
PROT UR STRIP.AUTO-MCNC: NEGATIVE MG/DL
RBC # BLD AUTO: 4.72 M/UL (ref 4.2–5.9)
SODIUM SERPL-SCNC: 140 MMOL/L (ref 136–145)
SP GR UR STRIP.AUTO: 1.02 (ref 1–1.03)
UA COMPLETE W REFLEX CULTURE PNL UR: NORMAL
UA DIPSTICK W REFLEX MICRO PNL UR: NORMAL
URN SPEC COLLECT METH UR: NORMAL
UROBILINOGEN UR STRIP-ACNC: 0.2 E.U./DL
WBC # BLD AUTO: 7.7 K/UL (ref 4–11)

## 2023-05-09 PROCEDURE — 6360000002 HC RX W HCPCS: Performed by: PHYSICIAN ASSISTANT

## 2023-05-09 PROCEDURE — 6360000004 HC RX CONTRAST MEDICATION: Performed by: PHYSICIAN ASSISTANT

## 2023-05-09 PROCEDURE — 99285 EMERGENCY DEPT VISIT HI MDM: CPT

## 2023-05-09 PROCEDURE — 81003 URINALYSIS AUTO W/O SCOPE: CPT

## 2023-05-09 PROCEDURE — 96374 THER/PROPH/DIAG INJ IV PUSH: CPT

## 2023-05-09 PROCEDURE — 80053 COMPREHEN METABOLIC PANEL: CPT

## 2023-05-09 PROCEDURE — 85025 COMPLETE CBC W/AUTO DIFF WBC: CPT

## 2023-05-09 PROCEDURE — 74177 CT ABD & PELVIS W/CONTRAST: CPT

## 2023-05-09 PROCEDURE — 83690 ASSAY OF LIPASE: CPT

## 2023-05-09 RX ORDER — LOSARTAN POTASSIUM AND HYDROCHLOROTHIAZIDE 12.5; 1 MG/1; MG/1
TABLET ORAL
COMMUNITY
Start: 2023-04-24

## 2023-05-09 RX ORDER — ONDANSETRON 2 MG/ML
4 INJECTION INTRAMUSCULAR; INTRAVENOUS ONCE
Status: COMPLETED | OUTPATIENT
Start: 2023-05-09 | End: 2023-05-09

## 2023-05-09 RX ADMIN — IOPAMIDOL 75 ML: 755 INJECTION, SOLUTION INTRAVENOUS at 20:17

## 2023-05-09 RX ADMIN — ONDANSETRON 4 MG: 2 INJECTION INTRAMUSCULAR; INTRAVENOUS at 19:15

## 2023-05-09 RX ADMIN — IOHEXOL 50 ML: 240 INJECTION, SOLUTION INTRATHECAL; INTRAVASCULAR; INTRAVENOUS; ORAL at 19:05

## 2023-05-09 ASSESSMENT — PAIN SCALES - GENERAL: PAINLEVEL_OUTOF10: 8

## 2023-05-09 ASSESSMENT — PAIN DESCRIPTION - DESCRIPTORS: DESCRIPTORS: PRESSURE

## 2023-05-09 ASSESSMENT — PAIN DESCRIPTION - ONSET: ONSET: GRADUAL

## 2023-05-09 ASSESSMENT — PAIN DESCRIPTION - FREQUENCY: FREQUENCY: CONTINUOUS

## 2023-05-09 ASSESSMENT — PAIN DESCRIPTION - LOCATION: LOCATION: ABDOMEN

## 2023-05-09 ASSESSMENT — PAIN DESCRIPTION - PAIN TYPE: TYPE: ACUTE PAIN

## 2023-05-09 ASSESSMENT — PAIN - FUNCTIONAL ASSESSMENT: PAIN_FUNCTIONAL_ASSESSMENT: 0-10

## 2023-05-09 NOTE — ED PROVIDER NOTES
[address] [facility]  ED  EMERGENCY DEPARTMENT ENCOUNTER        Pt Name: Vernell Garcia  MRN: [id number]  [place] 1980  Date of evaluation: 5/9/2023  Provider: JOSE Oden  PCP: No primary care provider on file. Note Started: 5:34 PM EDT 5/9/23       I have seen and evaluated this patient with my supervising physician No att. providers found. CHIEF COMPLAINT       Chief Complaint   Patient presents with    Constipation     Pt states known hernia, has follow up with surgery tomorrow. Pt states only small BMs for 2 days. HISTORY OF PRESENT ILLNESS: 1 or more Elements     History from : Patient    Limitations to history : None    Vernell Garcia is a 36 y.o. male who presents to the emergency department today complaining of abdominal pain and constipation. Patient states he was recently evaluated in the emergency department for abdominal pain and diagnosed with an umbilical hernia and has follow-up with general surgery tomorrow. He states he has been unable to have a normal bowel movement for the past 2 days. He has passed a small amount of stool. He is still passing gas. He does complain of nausea but no vomiting. He states pain has significantly worsened. He was advised by the emergency department to return if he were to develop any new or worsening symptoms so came back to the emergency department for evaluation. He currently rates pain as 8/10. He has not taken anything for symptoms. No fevers or chills. No chest pain, shortness of breath or cough. Nursing Notes were all reviewed and agreed with or any disagreements were addressed in the HPI. REVIEW OF SYSTEMS :      Review of Systems    Positives and Pertinent negatives as per HPI.      SURGICAL HISTORY     Past Surgical History:   Procedure Laterality Date    FOOT SURGERY      HAND SURGERY      HERNIA REPAIR      KNEE ARTHROSCOPY         CURRENTMEDICATIONS       Discharge Medication List as of 5/9/2023 10:05

## 2023-05-10 NOTE — ED PROVIDER NOTES
Emergency Department Attending Provider Note  Location: [facility]  ED  5/9/2023     Chief Complaint   Patient presents with    Constipation     Pt states known hernia, has follow up with surgery tomorrow. Pt states only small BMs for 2 days. PATIENT ID:  Moses Zepeda is a 36 y.o. 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 He states that he has a general surgery appointment tomorrow, but is worried about not having bowel movements. Otherwise, vital signs stable outside of some hypertension, consistent with baseline. Otherwise, patient is alert, conversational, and in no acute distress. Although initial history and physical exam information was obtained by JOSE Wise (who also dictated a record of this visit), I personally saw the patient and performed a substantive portion of the visit including all aspects of the medical decision making. BASIC EXAM:  Small umbilical hernia without skin color changes. Is slightly tender to palpation, reproducible. Otherwise, abdomen soft, nondistended. Otherwise, no acute physical cardiopulmonary distress. CT ABDOMEN PELVIS W IV CONTRAST Additional Contrast? Oral   Final Result   A small fat-containing umbilical hernia with associated inflammation is   unchanged from recent CT.              Labs Reviewed   CBC WITH AUTO DIFFERENTIAL - Abnormal; Notable for the following components:       Result Value    Hematocrit 40.0 (*)     All other components within normal limits   COMPREHENSIVE METABOLIC PANEL W/ REFLEX TO MG FOR LOW K - Abnormal; Notable for the following components:    Glucose 108 (*)     All other components within

## 2023-10-03 ENCOUNTER — HOSPITAL ENCOUNTER (EMERGENCY)
Age: 43
Discharge: HOME | End: 2023-10-03
Attending: EMERGENCY MEDICINE

## 2023-10-03 VITALS
OXYGEN SATURATION: 97 % | HEART RATE: 56 BPM | BODY MASS INDEX: 39.34 KG/M2 | DIASTOLIC BLOOD PRESSURE: 97 MMHG | RESPIRATION RATE: 18 BRPM | SYSTOLIC BLOOD PRESSURE: 148 MMHG | WEIGHT: 222 LBS | TEMPERATURE: 98.1 F | HEIGHT: 63 IN

## 2023-10-03 DIAGNOSIS — G56.22 ULNAR NERVE PALSY OF LEFT UPPER EXTREMITY: Primary | ICD-10-CM

## 2023-10-03 LAB
ANION GAP SERPL CALCULATED.3IONS-SCNC: 14 MMOL/L (ref 3–16)
BASOPHILS # BLD: 0.1 K/UL (ref 0–0.2)
BASOPHILS NFR BLD: 1.7 %
BUN SERPL-MCNC: 11 MG/DL (ref 7–20)
CALCIUM SERPL-MCNC: 9.8 MG/DL (ref 8.3–10.6)
CHLORIDE SERPL-SCNC: 104 MMOL/L (ref 99–110)
CO2 SERPL-SCNC: 22 MMOL/L (ref 21–32)
CREAT SERPL-MCNC: 0.8 MG/DL (ref 0.9–1.3)
DEPRECATED RDW RBC AUTO: 13.3 % (ref 12.4–15.4)
EOSINOPHIL # BLD: 0.3 K/UL (ref 0–0.6)
EOSINOPHIL NFR BLD: 6.1 %
GFR SERPLBLD CREATININE-BSD FMLA CKD-EPI: >60 ML/MIN/{1.73_M2}
GLUCOSE SERPL-MCNC: 102 MG/DL (ref 70–99)
HCT VFR BLD AUTO: 41.1 % (ref 40.5–52.5)
HGB BLD-MCNC: 14.2 G/DL (ref 13.5–17.5)
LYMPHOCYTES # BLD: 1.3 K/UL (ref 1–5.1)
LYMPHOCYTES NFR BLD: 23.1 %
MCH RBC QN AUTO: 29.3 PG (ref 26–34)
MCHC RBC AUTO-ENTMCNC: 34.5 G/DL (ref 31–36)
MCV RBC AUTO: 84.9 FL (ref 80–100)
MONOCYTES # BLD: 0.3 K/UL (ref 0–1.3)
MONOCYTES NFR BLD: 5.2 %
NEUTROPHILS # BLD: 3.5 K/UL (ref 1.7–7.7)
NEUTROPHILS NFR BLD: 63.9 %
PLATELET # BLD AUTO: 309 K/UL (ref 135–450)
PMV BLD AUTO: 8.6 FL (ref 5–10.5)
POTASSIUM SERPL-SCNC: 3.9 MMOL/L (ref 3.5–5.1)
RBC # BLD AUTO: 4.84 M/UL (ref 4.2–5.9)
SODIUM SERPL-SCNC: 140 MMOL/L (ref 136–145)
WBC # BLD AUTO: 5.4 K/UL (ref 4–11)

## 2023-10-03 PROCEDURE — 36415 COLL VENOUS BLD VENIPUNCTURE: CPT

## 2023-10-03 PROCEDURE — 80048 BASIC METABOLIC PNL TOTAL CA: CPT

## 2023-10-03 PROCEDURE — 85025 COMPLETE CBC W/AUTO DIFF WBC: CPT

## 2023-10-03 PROCEDURE — 99283 EMERGENCY DEPT VISIT LOW MDM: CPT

## 2023-10-03 ASSESSMENT — PAIN DESCRIPTION - FREQUENCY: FREQUENCY: CONTINUOUS

## 2023-10-03 ASSESSMENT — LIFESTYLE VARIABLES
HOW OFTEN DO YOU HAVE A DRINK CONTAINING ALCOHOL: MONTHLY OR LESS
HOW MANY STANDARD DRINKS CONTAINING ALCOHOL DO YOU HAVE ON A TYPICAL DAY: 3 OR 4

## 2023-10-03 ASSESSMENT — PAIN SCALES - GENERAL
PAINLEVEL_OUTOF10: 0
PAINLEVEL_OUTOF10: 0

## 2023-10-03 ASSESSMENT — PAIN DESCRIPTION - LOCATION: LOCATION: ARM;HAND

## 2023-10-03 ASSESSMENT — PAIN - FUNCTIONAL ASSESSMENT
PAIN_FUNCTIONAL_ASSESSMENT: 0-10
PAIN_FUNCTIONAL_ASSESSMENT: 0-10

## 2023-10-03 ASSESSMENT — PAIN DESCRIPTION - ONSET: ONSET: PROGRESSIVE

## 2023-10-03 ASSESSMENT — PAIN DESCRIPTION - ORIENTATION: ORIENTATION: LEFT

## 2023-10-03 ASSESSMENT — PAIN DESCRIPTION - PAIN TYPE: TYPE: ACUTE PAIN

## 2023-10-03 NOTE — ED PROVIDER NOTES
male sitting in bed in no apparent cardiorespiratory distress  HEENT:  head is atraumatic, pupils equal round and reactive to light, sclera are clear, oropharynx is nonerythematous  Neck: supple, no lymphadenopathy  Chest: nonlabored respirations, equal chest rise bilaterally, no accessory muscle use  Cardiovascular: Regular, rate, and rhythm, 2+ radial pulses bilaterally, capillary refill 2 seconds  Abdominal: Soft, nontender, nondistended, no rebound or guarding  Skin: Warm, dry well perfused, no rashes  Musculoskeletal: no obvious deformities, no tenderness to palpation diffusely, no midline cervical thoracic or lumbar spinal tenderness, negative Spurling's test  Neurologic:  alert and oriented x4, speech is clear and intact without dysarthria, gait is intact, moves all 4 extremities with full and equal strength with the exception of some 4 out of 5 strength in the fourth and fifth digit on the left hand has light touch dysesthesia overlying the fourth and fifth digit of the left hand otherwise sensation is intact throughout, cranial nerves II through XII are intact, intact finger-to-nose testing in bilateral upper extremities                 Radha Thompson MD  10/03/23 6436 Lauren Donald MD  10/03/23 5126

## 2023-10-03 NOTE — DISCHARGE INSTRUCTIONS
To the emergency department if you develop any worsened numbness tingling or weakness, or any other new concerning symptoms develop

## 2023-10-03 NOTE — ED TRIAGE NOTES
Patient arrives to ED triage with c/o LUE/Left hand shakiness for \"about two hours\". Patient also reports that arm \"feels weird\", but is unable to describe the sensation in other words. Pt states, \"it just feels weird\". Patient reports some weakness as well. Patient denies blurry vision, denies headache. Patient a/oX4.

## 2023-12-11 ENCOUNTER — HOSPITAL ENCOUNTER (EMERGENCY)
Age: 43
Discharge: HOME | End: 2023-12-11
Attending: EMERGENCY MEDICINE

## 2023-12-11 VITALS
TEMPERATURE: 98.3 F | BODY MASS INDEX: 41.15 KG/M2 | HEART RATE: 83 BPM | RESPIRATION RATE: 18 BRPM | OXYGEN SATURATION: 99 % | DIASTOLIC BLOOD PRESSURE: 72 MMHG | WEIGHT: 232.3 LBS | SYSTOLIC BLOOD PRESSURE: 136 MMHG

## 2023-12-11 DIAGNOSIS — R05.1 ACUTE COUGH: ICD-10-CM

## 2023-12-11 DIAGNOSIS — U07.1 COVID-19: Primary | ICD-10-CM

## 2023-12-11 PROCEDURE — 99283 EMERGENCY DEPT VISIT LOW MDM: CPT

## 2023-12-11 RX ORDER — HYDROCODONE BITARTRATE AND ACETAMINOPHEN 5; 325 MG/1; MG/1
1 TABLET ORAL EVERY 4 HOURS PRN
Qty: 12 TABLET | Refills: 0 | Status: SHIPPED | OUTPATIENT
Start: 2023-12-11 | End: 2023-12-11 | Stop reason: CLARIF

## 2023-12-11 RX ORDER — HYDROCODONE BITARTRATE AND ACETAMINOPHEN 5; 325 MG/1; MG/1
1 TABLET ORAL EVERY 4 HOURS PRN
Qty: 12 TABLET | Refills: 0 | Status: SHIPPED | OUTPATIENT
Start: 2023-12-11 | End: 2023-12-14

## 2023-12-11 ASSESSMENT — PAIN - FUNCTIONAL ASSESSMENT: PAIN_FUNCTIONAL_ASSESSMENT: 0-10

## 2023-12-11 ASSESSMENT — PAIN SCALES - GENERAL: PAINLEVEL_OUTOF10: 6

## 2023-12-11 ASSESSMENT — PAIN DESCRIPTION - LOCATION: LOCATION: GENERALIZED

## 2023-12-11 NOTE — ED TRIAGE NOTES
Pt states he tested positive for covid, he does not feel any worse but does not feel any better. Pt has been taking motrin for headache, but states he needs something for his cough. Pt states there is multiple people at home sick also.

## 2023-12-11 NOTE — DISCHARGE INSTRUCTIONS
Please use your albuterol inhaler for your cough as needed, continue to try Delsym, use the hydrocodone as needed for cough suppression especially at night when trying to sleep

## 2023-12-11 NOTE — ED PROVIDER NOTES
[facility]          Attending Physician Note     Date of evaluation: 12/11/2023    Chief Complaint     Positive For Covid-19      History of Present Illness     Kevin Wade is a 39 y.o. male who comes in with recent positive COVID test, states he cannot control his cough at home, given the headache and keeping him awake at night. Also reports some wheezing at times. States he does have albuterol inhaler at home but does not been using it. Trying Delsym for the cough without success. 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 He has never used smokeless tobacco. He reports current alcohol use. He reports that he does not use drugs. Family History: His family history is not on file. Medications     Previous Medications    ALBUTEROL SULFATE HFA (PROVENTIL HFA) 108 (90 BASE) MCG/ACT INHALER    Inhale 2 puffs into the lungs every 4 hours as needed for Wheezing or Shortness of Breath (Space out to every 6 hours as symptoms improve) Space out to every 6 hours as symptoms improve. ATORVASTATIN (LIPITOR) 20 MG TABLET        DICYCLOMINE (BENTYL) 10 MG CAPSULE    Take 10 mg by mouth 3 times daily as needed    HYDROCHLOROTHIAZIDE (MICROZIDE) 12.5 MG CAPSULE    Take 12.5 mg by mouth daily    LOSARTAN-HYDROCHLOROTHIAZIDE (HYZAAR) 100-12.5 MG PER TABLET           Allergies     Allergies:    Allergies as of 12/11/2023 - Fully Reviewed

## 2024-08-23 ENCOUNTER — APPOINTMENT (OUTPATIENT)
Dept: GENERAL RADIOLOGY | Age: 44
End: 2024-08-23

## 2024-08-23 ENCOUNTER — HOSPITAL ENCOUNTER (EMERGENCY)
Age: 44
Discharge: HOME | End: 2024-08-23
Attending: STUDENT IN AN ORGANIZED HEALTH CARE EDUCATION/TRAINING PROGRAM

## 2024-08-23 VITALS
HEIGHT: 63 IN | TEMPERATURE: 98.8 F | WEIGHT: 220 LBS | RESPIRATION RATE: 18 BRPM | HEART RATE: 70 BPM | BODY MASS INDEX: 38.98 KG/M2 | SYSTOLIC BLOOD PRESSURE: 132 MMHG | OXYGEN SATURATION: 97 % | DIASTOLIC BLOOD PRESSURE: 87 MMHG

## 2024-08-23 DIAGNOSIS — R07.9 CHEST PAIN, UNSPECIFIED TYPE: Primary | ICD-10-CM

## 2024-08-23 LAB
ALBUMIN SERPL-MCNC: 5 G/DL (ref 3.4–5)
ALBUMIN/GLOB SERPL: 1.9 {RATIO} (ref 1.1–2.2)
ALP SERPL-CCNC: 79 U/L (ref 40–129)
ALT SERPL-CCNC: 25 U/L (ref 10–40)
ANION GAP SERPL CALCULATED.3IONS-SCNC: 13 MMOL/L (ref 3–16)
AST SERPL-CCNC: 21 U/L (ref 15–37)
BASOPHILS # BLD: 0.1 K/UL (ref 0–0.2)
BASOPHILS NFR BLD: 1 %
BILIRUB SERPL-MCNC: 2.3 MG/DL (ref 0–1)
BUN SERPL-MCNC: 19 MG/DL (ref 7–20)
CALCIUM SERPL-MCNC: 9.8 MG/DL (ref 8.3–10.6)
CHLORIDE SERPL-SCNC: 101 MMOL/L (ref 99–110)
CO2 SERPL-SCNC: 25 MMOL/L (ref 21–32)
CREAT SERPL-MCNC: 0.8 MG/DL (ref 0.9–1.3)
DEPRECATED RDW RBC AUTO: 13.2 % (ref 12.4–15.4)
EKG ATRIAL RATE: 68 BPM
EKG DIAGNOSIS: NORMAL
EKG P AXIS: 40 DEGREES
EKG P-R INTERVAL: 146 MS
EKG Q-T INTERVAL: 390 MS
EKG QRS DURATION: 88 MS
EKG QTC CALCULATION (BAZETT): 414 MS
EKG R AXIS: 10 DEGREES
EKG T AXIS: 32 DEGREES
EKG VENTRICULAR RATE: 68 BPM
EOSINOPHIL # BLD: 0.3 K/UL (ref 0–0.6)
EOSINOPHIL NFR BLD: 4.2 %
GFR SERPLBLD CREATININE-BSD FMLA CKD-EPI: >90 ML/MIN/{1.73_M2}
GLUCOSE SERPL-MCNC: 110 MG/DL (ref 70–99)
HCT VFR BLD AUTO: 45.9 % (ref 40.5–52.5)
HGB BLD-MCNC: 15.7 G/DL (ref 13.5–17.5)
LYMPHOCYTES # BLD: 1.5 K/UL (ref 1–5.1)
LYMPHOCYTES NFR BLD: 19.3 %
MAGNESIUM SERPL-MCNC: 2.2 MG/DL (ref 1.8–2.4)
MCH RBC QN AUTO: 30.8 PG (ref 26–34)
MCHC RBC AUTO-ENTMCNC: 34.3 G/DL (ref 31–36)
MCV RBC AUTO: 89.9 FL (ref 80–100)
MONOCYTES # BLD: 0.5 K/UL (ref 0–1.3)
MONOCYTES NFR BLD: 6.6 %
NEUTROPHILS # BLD: 5.2 K/UL (ref 1.7–7.7)
NEUTROPHILS NFR BLD: 68.9 %
PLATELET # BLD AUTO: 327 K/UL (ref 135–450)
PMV BLD AUTO: 8.1 FL (ref 5–10.5)
POTASSIUM SERPL-SCNC: 3.5 MMOL/L (ref 3.5–5.1)
PROT SERPL-MCNC: 7.6 G/DL (ref 6.4–8.2)
RBC # BLD AUTO: 5.11 M/UL (ref 4.2–5.9)
SODIUM SERPL-SCNC: 139 MMOL/L (ref 136–145)
TROPONIN, HIGH SENSITIVITY: 8 NG/L (ref 0–22)
WBC # BLD AUTO: 7.6 K/UL (ref 4–11)

## 2024-08-23 PROCEDURE — 99285 EMERGENCY DEPT VISIT HI MDM: CPT

## 2024-08-23 PROCEDURE — 36415 COLL VENOUS BLD VENIPUNCTURE: CPT

## 2024-08-23 PROCEDURE — 2580000003 HC RX 258: Performed by: STUDENT IN AN ORGANIZED HEALTH CARE EDUCATION/TRAINING PROGRAM

## 2024-08-23 PROCEDURE — 6360000002 HC RX W HCPCS: Performed by: STUDENT IN AN ORGANIZED HEALTH CARE EDUCATION/TRAINING PROGRAM

## 2024-08-23 PROCEDURE — 83735 ASSAY OF MAGNESIUM: CPT

## 2024-08-23 PROCEDURE — 80053 COMPREHEN METABOLIC PANEL: CPT

## 2024-08-23 PROCEDURE — 93010 ELECTROCARDIOGRAM REPORT: CPT | Performed by: INTERNAL MEDICINE

## 2024-08-23 PROCEDURE — 93005 ELECTROCARDIOGRAM TRACING: CPT | Performed by: STUDENT IN AN ORGANIZED HEALTH CARE EDUCATION/TRAINING PROGRAM

## 2024-08-23 PROCEDURE — 85025 COMPLETE CBC W/AUTO DIFF WBC: CPT

## 2024-08-23 PROCEDURE — 71045 X-RAY EXAM CHEST 1 VIEW: CPT

## 2024-08-23 PROCEDURE — 96374 THER/PROPH/DIAG INJ IV PUSH: CPT

## 2024-08-23 PROCEDURE — 84484 ASSAY OF TROPONIN QUANT: CPT

## 2024-08-23 RX ORDER — KETOROLAC TROMETHAMINE 30 MG/ML
15 INJECTION, SOLUTION INTRAMUSCULAR; INTRAVENOUS ONCE
Status: COMPLETED | OUTPATIENT
Start: 2024-08-23 | End: 2024-08-23

## 2024-08-23 RX ORDER — 0.9 % SODIUM CHLORIDE 0.9 %
1000 INTRAVENOUS SOLUTION INTRAVENOUS ONCE
Status: COMPLETED | OUTPATIENT
Start: 2024-08-23 | End: 2024-08-23

## 2024-08-23 RX ADMIN — SODIUM CHLORIDE 1000 ML: 9 INJECTION, SOLUTION INTRAVENOUS at 13:17

## 2024-08-23 RX ADMIN — KETOROLAC TROMETHAMINE 15 MG: 30 INJECTION, SOLUTION INTRAMUSCULAR at 13:18

## 2024-08-23 ASSESSMENT — PAIN - FUNCTIONAL ASSESSMENT
PAIN_FUNCTIONAL_ASSESSMENT: 0-10
PAIN_FUNCTIONAL_ASSESSMENT: 0-10

## 2024-08-23 ASSESSMENT — HEART SCORE: ECG: NORMAL

## 2024-08-23 ASSESSMENT — PAIN DESCRIPTION - LOCATION: LOCATION: CHEST

## 2024-08-23 ASSESSMENT — LIFESTYLE VARIABLES
HOW OFTEN DO YOU HAVE A DRINK CONTAINING ALCOHOL: NEVER
HOW MANY STANDARD DRINKS CONTAINING ALCOHOL DO YOU HAVE ON A TYPICAL DAY: PATIENT DOES NOT DRINK

## 2024-08-23 ASSESSMENT — PAIN SCALES - GENERAL
PAINLEVEL_OUTOF10: 6
PAINLEVEL_OUTOF10: 6
PAINLEVEL_OUTOF10: 10

## 2024-08-23 ASSESSMENT — PAIN DESCRIPTION - ORIENTATION: ORIENTATION: MID

## 2024-08-23 ASSESSMENT — PAIN DESCRIPTION - DESCRIPTORS: DESCRIPTORS: ACHING

## 2024-08-23 NOTE — DISCHARGE INSTRUCTIONS
Follow-up with your primary care doctor as well as your cardiologist.  Return if develop any new or worsening symptoms.  Take your current medications as prescribed.

## 2024-08-23 NOTE — ED PROVIDER NOTES
[facility]  ED      EMERGENCY MEDICINE     Pt Name: Aamir Hinds  MRN: [id number]  Birthdate 1980  Date of evaluation: 8/23/2024  Provider: Luis Antonio Diego MD    CHIEF COMPLAINT       Chief Complaint   Patient presents with    Chest Pain     Pt reports chest pain for 2 days. Pt has a history of undiagnosed chest pain.      HISTORY OF PRESENT ILLNESS   Aamir Hinds is a 43 y.o. male who presents to the emergency department for sharp chest pain this been ongoing over the last few days he has chronic chest painfrom precordial catch and has had extensive cardiac workups in the past which has led to this diagnosis.  He follows with a cardiologist in [place].  His chest pain is nonexertional nonpleuritic in nature nonradiating no associated nausea vomiting diaphoresis.  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       Patient Active Problem List   Diagnosis Code    Obesity E66.9    HTN (hypertension) I10    Hyperlipidemia E78.5    Tobacco abuse Z72.0    Chest pain R07.9     SURGICAL HISTORY       Past Surgical History:   Procedure Laterality Date    FOOT SURGERY      HAND SURGERY      HERNIA REPAIR      HERNIA REPAIR  03/2023    KNEE ARTHROSCOPY         CURRENT MEDICATIONS       Previous Medications    ALBUTEROL SULFATE HFA (PROVENTIL HFA) 108 (90 BASE) MCG/ACT INHALER    Inhale 2 puffs into the lungs every 4 hours as needed for Wheezing or Shortness of Breath (Space out to every 6 hours as symptoms improve) Space out to every 6 hours as symptoms improve.    ATORVASTATIN (LIPITOR) 20 MG TABLET        DICYCLOMINE (BENTYL) 10 MG CAPSULE    Take 10 mg by mouth 3 times daily as needed    HYDROCHLOROTHIAZIDE (MICROZIDE) 12.5 MG CAPSULE    Take 12.5 mg by mouth daily    LOSARTAN-HYDROCHLOROTHIAZIDE (HYZAAR)

## 2024-08-24 ENCOUNTER — HOSPITAL ENCOUNTER (EMERGENCY)
Age: 44
Discharge: HOME | End: 2024-08-24

## 2024-08-24 VITALS
SYSTOLIC BLOOD PRESSURE: 145 MMHG | DIASTOLIC BLOOD PRESSURE: 98 MMHG | RESPIRATION RATE: 16 BRPM | HEIGHT: 63 IN | TEMPERATURE: 98.1 F | OXYGEN SATURATION: 99 % | WEIGHT: 220 LBS | BODY MASS INDEX: 38.98 KG/M2 | HEART RATE: 64 BPM

## 2024-08-24 DIAGNOSIS — R07.9 CHEST PAIN, UNSPECIFIED TYPE: Primary | ICD-10-CM

## 2024-08-24 LAB
ALBUMIN SERPL-MCNC: 4.5 G/DL (ref 3.4–5)
ALBUMIN/GLOB SERPL: 1.9 {RATIO} (ref 1.1–2.2)
ALP SERPL-CCNC: 73 U/L (ref 40–129)
ALT SERPL-CCNC: 21 U/L (ref 10–40)
ANION GAP SERPL CALCULATED.3IONS-SCNC: 11 MMOL/L (ref 3–16)
AST SERPL-CCNC: 16 U/L (ref 15–37)
BASOPHILS # BLD: 0.1 K/UL (ref 0–0.2)
BASOPHILS NFR BLD: 1.6 %
BILIRUB SERPL-MCNC: 1 MG/DL (ref 0–1)
BUN SERPL-MCNC: 17 MG/DL (ref 7–20)
CALCIUM SERPL-MCNC: 9.2 MG/DL (ref 8.3–10.6)
CHLORIDE SERPL-SCNC: 104 MMOL/L (ref 99–110)
CO2 SERPL-SCNC: 22 MMOL/L (ref 21–32)
CREAT SERPL-MCNC: 0.7 MG/DL (ref 0.9–1.3)
DEPRECATED RDW RBC AUTO: 12.8 % (ref 12.4–15.4)
EOSINOPHIL # BLD: 0.4 K/UL (ref 0–0.6)
EOSINOPHIL NFR BLD: 5.5 %
GFR SERPLBLD CREATININE-BSD FMLA CKD-EPI: >90 ML/MIN/{1.73_M2}
GLUCOSE SERPL-MCNC: 91 MG/DL (ref 70–99)
HCT VFR BLD AUTO: 43.8 % (ref 40.5–52.5)
HGB BLD-MCNC: 14.8 G/DL (ref 13.5–17.5)
LYMPHOCYTES # BLD: 1.8 K/UL (ref 1–5.1)
LYMPHOCYTES NFR BLD: 22.3 %
MCH RBC QN AUTO: 30.4 PG (ref 26–34)
MCHC RBC AUTO-ENTMCNC: 33.9 G/DL (ref 31–36)
MCV RBC AUTO: 89.6 FL (ref 80–100)
MONOCYTES # BLD: 0.5 K/UL (ref 0–1.3)
MONOCYTES NFR BLD: 6.3 %
NEUTROPHILS # BLD: 5.3 K/UL (ref 1.7–7.7)
NEUTROPHILS NFR BLD: 64.3 %
PLATELET # BLD AUTO: 331 K/UL (ref 135–450)
PMV BLD AUTO: 7.8 FL (ref 5–10.5)
POTASSIUM SERPL-SCNC: 3.9 MMOL/L (ref 3.5–5.1)
PROT SERPL-MCNC: 6.9 G/DL (ref 6.4–8.2)
RBC # BLD AUTO: 4.89 M/UL (ref 4.2–5.9)
SODIUM SERPL-SCNC: 137 MMOL/L (ref 136–145)
TROPONIN, HIGH SENSITIVITY: 8 NG/L (ref 0–22)
WBC # BLD AUTO: 8.2 K/UL (ref 4–11)

## 2024-08-24 PROCEDURE — 99284 EMERGENCY DEPT VISIT MOD MDM: CPT

## 2024-08-24 PROCEDURE — 85025 COMPLETE CBC W/AUTO DIFF WBC: CPT

## 2024-08-24 PROCEDURE — 84484 ASSAY OF TROPONIN QUANT: CPT

## 2024-08-24 PROCEDURE — 6360000002 HC RX W HCPCS: Performed by: PHYSICIAN ASSISTANT

## 2024-08-24 PROCEDURE — 93005 ELECTROCARDIOGRAM TRACING: CPT | Performed by: STUDENT IN AN ORGANIZED HEALTH CARE EDUCATION/TRAINING PROGRAM

## 2024-08-24 PROCEDURE — 96374 THER/PROPH/DIAG INJ IV PUSH: CPT

## 2024-08-24 PROCEDURE — 80053 COMPREHEN METABOLIC PANEL: CPT

## 2024-08-24 RX ORDER — KETOROLAC TROMETHAMINE 10 MG/1
10 TABLET, FILM COATED ORAL EVERY 6 HOURS PRN
Qty: 20 TABLET | Refills: 0 | Status: SHIPPED | OUTPATIENT
Start: 2024-08-24 | End: 2025-08-24

## 2024-08-24 RX ORDER — KETOROLAC TROMETHAMINE 30 MG/ML
15 INJECTION, SOLUTION INTRAMUSCULAR; INTRAVENOUS ONCE
Status: COMPLETED | OUTPATIENT
Start: 2024-08-24 | End: 2024-08-24

## 2024-08-24 RX ADMIN — KETOROLAC TROMETHAMINE 15 MG: 30 INJECTION, SOLUTION INTRAMUSCULAR at 17:06

## 2024-08-24 ASSESSMENT — PAIN SCALES - GENERAL: PAINLEVEL_OUTOF10: 10

## 2024-08-24 ASSESSMENT — PAIN - FUNCTIONAL ASSESSMENT: PAIN_FUNCTIONAL_ASSESSMENT: 0-10

## 2024-08-24 ASSESSMENT — LIFESTYLE VARIABLES
HOW MANY STANDARD DRINKS CONTAINING ALCOHOL DO YOU HAVE ON A TYPICAL DAY: 1 OR 2
HOW OFTEN DO YOU HAVE A DRINK CONTAINING ALCOHOL: MONTHLY OR LESS

## 2024-08-24 NOTE — ED PROVIDER NOTES
[facility]  ED  EMERGENCY DEPARTMENT ENCOUNTER        Pt Name: Aamir Hinds  MRN: [id number]  Birthdate 1980  Date of evaluation: 8/24/2024  Provider: JOSE Mayers  PCP: No primary care provider on file.  Note Started: 6:01 PM EDT 8/24/24      HENRY. I have evaluated this patient.        CHIEF COMPLAINT       Chief Complaint   Patient presents with    Chest Pain     Chest pain since yesterday evening, was seen in ED yesterday and given medication to take at home for the pain.         HISTORY OF PRESENT ILLNESS: 1 or more Elements     History from : Patient    Limitations to history : None    Aamir Hinds is a 43 y.o. male who presents to the emergency department complaining of chest pain.  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  He states he has had extensive cardiac workups in the past which ultimately led to this diagnosis.  His cardiologist is with [facility].  Patient states he was seen in the emergency department yesterday for the same complaint, he was given medication while here and ultimately discharged home.  He states a few hours after being discharged home the pain returned therefore came back in today.  He states pain is nonexertional and nonpleuritic.  It does not radiate anywhere.  Denies any abdominal pain, nausea or vomiting.  Denies any recent illness.  He states he has a chronic cough from smoking.  Denies any lightheadedness or dizziness.  He has not taken anything for his pain.    Nursing Notes were all reviewed and agreed with or any disagreements were addressed in the HPI.    REVIEW OF SYSTEMS :      Review of Systems    Positives and Pertinent negatives as per HPI.     SURGICAL HISTORY     Past Surgical History:   Procedure Laterality Date    FOOT SURGERY      HAND SURGERY      HERNIA REPAIR      HERNIA REPAIR  03/2023    KNEE ARTHROSCOPY         CURRENTMEDICATIONS       Discharge Medication List as of 8/24/2024   moist.   Eyes:      General:         Right eye: No discharge.         Left eye: No discharge.      Extraocular Movements: Extraocular movements intact.      Pupils: Pupils are equal, round, and reactive to light.   Cardiovascular:      Rate and Rhythm: Normal rate and regular rhythm.      Pulses: Normal pulses.      Heart sounds: Normal heart sounds. No murmur heard.     No friction rub. No gallop.   Pulmonary:      Effort: Pulmonary effort is normal. No respiratory distress.      Breath sounds: Normal breath sounds. No stridor. No wheezing, rhonchi or rales.   Abdominal:      General: Abdomen is flat.      Palpations: Abdomen is soft.      Tenderness: There is no abdominal tenderness. There is no guarding or rebound.   Musculoskeletal:         General: Normal range of motion.      Cervical back: Normal range of motion and neck supple.      Right lower leg: No edema.      Left lower leg: No edema.   Skin:     General: Skin is warm and dry.      Coloration: Skin is not pale.   Neurological:      Mental Status: He is alert and oriented to person, place, and time.   Psychiatric:         Mood and Affect: Mood normal.         Behavior: Behavior normal.             DIAGNOSTIC RESULTS   LABS:    Labs Reviewed   COMPREHENSIVE METABOLIC PANEL W/ REFLEX TO MG FOR LOW K - Abnormal; Notable for the following components:       Result Value    Creatinine 0.7 (*)     All other components within normal limits   CBC WITH AUTO DIFFERENTIAL   TROPONIN       When ordered only abnormal lab results are displayed. All other labs were within normal range or not returned as of this dictation.    EKG: When ordered, EKG's are interpreted by the Emergency Department Physician in the absence of a cardiologist.  Please see their note for interpretation of EKG.    RADIOLOGY:   Non-plain film images such as CT, Ultrasound and MRI are read by the radiologist. Plain radiographic images are visualized and preliminarily interpreted by the ED Provider

## 2024-08-24 NOTE — ED PROVIDER NOTES
The Ekg interpreted by me shows  normal sinus rhythm with a rate of 81, sinus arrhythmia  Axis is   Normal  QTc is  normal  Intervals and Durations are unremarkable.      ST Segments: normal  No significant change from prior EKG dated 8/23/2024         Dianelys Christine MD  08/24/24 0767

## 2024-08-25 LAB
EKG ATRIAL RATE: 81 BPM
EKG DIAGNOSIS: NORMAL
EKG P AXIS: 33 DEGREES
EKG P-R INTERVAL: 152 MS
EKG Q-T INTERVAL: 370 MS
EKG QRS DURATION: 90 MS
EKG QTC CALCULATION (BAZETT): 429 MS
EKG R AXIS: -6 DEGREES
EKG T AXIS: 8 DEGREES
EKG VENTRICULAR RATE: 81 BPM

## 2024-08-25 PROCEDURE — 93010 ELECTROCARDIOGRAM REPORT: CPT | Performed by: INTERNAL MEDICINE

## 2024-08-27 ENCOUNTER — HOSPITAL ENCOUNTER (EMERGENCY)
Age: 44
Discharge: HOME | End: 2024-08-27

## 2024-08-27 VITALS
WEIGHT: 220 LBS | TEMPERATURE: 98.5 F | DIASTOLIC BLOOD PRESSURE: 93 MMHG | SYSTOLIC BLOOD PRESSURE: 132 MMHG | HEART RATE: 73 BPM | RESPIRATION RATE: 18 BRPM | HEIGHT: 63 IN | OXYGEN SATURATION: 97 % | BODY MASS INDEX: 38.98 KG/M2

## 2024-08-27 DIAGNOSIS — S39.012A STRAIN OF LUMBAR REGION, INITIAL ENCOUNTER: Primary | ICD-10-CM

## 2024-08-27 PROCEDURE — 99283 EMERGENCY DEPT VISIT LOW MDM: CPT

## 2024-08-27 RX ORDER — PREDNISONE 10 MG/1
TABLET ORAL
Qty: 20 TABLET | Refills: 0 | Status: SHIPPED | OUTPATIENT
Start: 2024-08-27 | End: 2024-09-06

## 2024-08-27 ASSESSMENT — PAIN SCALES - GENERAL: PAINLEVEL_OUTOF10: 8

## 2024-08-27 ASSESSMENT — PAIN - FUNCTIONAL ASSESSMENT: PAIN_FUNCTIONAL_ASSESSMENT: 0-10

## 2024-09-02 NOTE — ED PROVIDER NOTES
note were completed with a voice recognition program.  Efforts were made to edit the dictations but occasionally words are mis-transcribed.)    FLORES Wilcox CNP (electronically signed)       Son Yang APRN - CNP  09/02/24 0007

## 2024-12-26 ENCOUNTER — HOSPITAL ENCOUNTER (EMERGENCY)
Age: 44
Discharge: HOME | End: 2024-12-26
Payer: COMMERCIAL

## 2024-12-26 ENCOUNTER — APPOINTMENT (OUTPATIENT)
Dept: GENERAL RADIOLOGY | Age: 44
End: 2024-12-26
Payer: COMMERCIAL

## 2024-12-26 VITALS
RESPIRATION RATE: 18 BRPM | BODY MASS INDEX: 38.62 KG/M2 | TEMPERATURE: 98 F | OXYGEN SATURATION: 98 % | HEART RATE: 73 BPM | SYSTOLIC BLOOD PRESSURE: 149 MMHG | HEIGHT: 63 IN | DIASTOLIC BLOOD PRESSURE: 92 MMHG | WEIGHT: 218 LBS

## 2024-12-26 DIAGNOSIS — R07.9 CHEST PAIN, UNSPECIFIED TYPE: Primary | ICD-10-CM

## 2024-12-26 LAB
ALBUMIN SERPL-MCNC: 4.8 G/DL (ref 3.4–5)
ALBUMIN/GLOB SERPL: 2 {RATIO} (ref 1.1–2.2)
ALP SERPL-CCNC: 83 U/L (ref 40–129)
ALT SERPL-CCNC: 23 U/L (ref 10–40)
ANION GAP SERPL CALCULATED.3IONS-SCNC: 10 MMOL/L (ref 3–16)
AST SERPL-CCNC: 20 U/L (ref 15–37)
BASOPHILS # BLD: 0.2 K/UL (ref 0–0.2)
BASOPHILS NFR BLD: 2.9 %
BILIRUB SERPL-MCNC: 2.1 MG/DL (ref 0–1)
BUN SERPL-MCNC: 12 MG/DL (ref 7–20)
CALCIUM SERPL-MCNC: 10 MG/DL (ref 8.3–10.6)
CHLORIDE SERPL-SCNC: 104 MMOL/L (ref 99–110)
CO2 SERPL-SCNC: 26 MMOL/L (ref 21–32)
CREAT SERPL-MCNC: 0.8 MG/DL (ref 0.9–1.3)
D-DIMER QUANTITATIVE: <0.27 UG/ML FEU (ref 0–0.6)
DEPRECATED RDW RBC AUTO: 12.8 % (ref 12.4–15.4)
EOSINOPHIL # BLD: 0.3 K/UL (ref 0–0.6)
EOSINOPHIL NFR BLD: 6 %
GFR SERPLBLD CREATININE-BSD FMLA CKD-EPI: >90 ML/MIN/{1.73_M2}
GLUCOSE SERPL-MCNC: 97 MG/DL (ref 70–99)
HCT VFR BLD AUTO: 42.7 % (ref 40.5–52.5)
HGB BLD-MCNC: 14.9 G/DL (ref 13.5–17.5)
LYMPHOCYTES # BLD: 1.5 K/UL (ref 1–5.1)
LYMPHOCYTES NFR BLD: 26.2 %
MCH RBC QN AUTO: 30 PG (ref 26–34)
MCHC RBC AUTO-ENTMCNC: 34.9 G/DL (ref 31–36)
MCV RBC AUTO: 85.9 FL (ref 80–100)
MONOCYTES # BLD: 0.3 K/UL (ref 0–1.3)
MONOCYTES NFR BLD: 5.8 %
NEUTROPHILS # BLD: 3.4 K/UL (ref 1.7–7.7)
NEUTROPHILS NFR BLD: 59.1 %
NT-PROBNP SERPL-MCNC: <36 PG/ML (ref 0–124)
PLATELET # BLD AUTO: 328 K/UL (ref 135–450)
PMV BLD AUTO: 7.9 FL (ref 5–10.5)
POTASSIUM SERPL-SCNC: 3.9 MMOL/L (ref 3.5–5.1)
PROT SERPL-MCNC: 7.2 G/DL (ref 6.4–8.2)
RBC # BLD AUTO: 4.97 M/UL (ref 4.2–5.9)
SODIUM SERPL-SCNC: 140 MMOL/L (ref 136–145)
TROPONIN, HIGH SENSITIVITY: 9 NG/L (ref 0–22)
WBC # BLD AUTO: 5.8 K/UL (ref 4–11)

## 2024-12-26 PROCEDURE — 71045 X-RAY EXAM CHEST 1 VIEW: CPT

## 2024-12-26 PROCEDURE — 80053 COMPREHEN METABOLIC PANEL: CPT

## 2024-12-26 PROCEDURE — 84484 ASSAY OF TROPONIN QUANT: CPT

## 2024-12-26 PROCEDURE — 6370000000 HC RX 637 (ALT 250 FOR IP): Performed by: PHYSICIAN ASSISTANT

## 2024-12-26 PROCEDURE — 85025 COMPLETE CBC W/AUTO DIFF WBC: CPT

## 2024-12-26 PROCEDURE — 93005 ELECTROCARDIOGRAM TRACING: CPT | Performed by: EMERGENCY MEDICINE

## 2024-12-26 PROCEDURE — 83880 ASSAY OF NATRIURETIC PEPTIDE: CPT

## 2024-12-26 PROCEDURE — 85379 FIBRIN DEGRADATION QUANT: CPT

## 2024-12-26 PROCEDURE — 99285 EMERGENCY DEPT VISIT HI MDM: CPT

## 2024-12-26 RX ORDER — HYDROCODONE BITARTRATE AND ACETAMINOPHEN 5; 325 MG/1; MG/1
1 TABLET ORAL ONCE
Status: COMPLETED | OUTPATIENT
Start: 2024-12-26 | End: 2024-12-26

## 2024-12-26 RX ADMIN — HYDROCODONE BITARTRATE AND ACETAMINOPHEN 1 TABLET: 5; 325 TABLET ORAL at 15:02

## 2024-12-26 ASSESSMENT — ENCOUNTER SYMPTOMS
CHEST TIGHTNESS: 0
ABDOMINAL PAIN: 0
WHEEZING: 0
DIARRHEA: 0
NAUSEA: 0
VOMITING: 0
COUGH: 0
SHORTNESS OF BREATH: 0

## 2024-12-26 ASSESSMENT — PAIN - FUNCTIONAL ASSESSMENT: PAIN_FUNCTIONAL_ASSESSMENT: 0-10

## 2024-12-26 ASSESSMENT — PAIN DESCRIPTION - LOCATION: LOCATION: CHEST

## 2024-12-26 ASSESSMENT — PAIN SCALES - GENERAL
PAINLEVEL_OUTOF10: 10
PAINLEVEL_OUTOF10: 10

## 2024-12-26 ASSESSMENT — HEART SCORE: ECG: NORMAL

## 2024-12-26 NOTE — ED PROVIDER NOTES
[facility] EMERGENCY DEPARTMENT  EMERGENCY DEPARTMENT ENCOUNTER        Pt Name: Aamir Hinds  MRN: [id number]  Birthdate 1980  Date of evaluation: 12/26/2024  Provider: Todd Ramirez PA-C  PCP: No primary care provider on file.  Note Started: 2:58 PM EST 12/26/24      HENRY. I have evaluated this patient.        CHIEF COMPLAINT       Chief Complaint   Patient presents with    Chest Pain     Patient states ongoing cp for a couple years, L sided stabbing chest pain that radiates to L shoulder and into his back.       HISTORY OF PRESENT ILLNESS: 1 or more Elements     History from : Patient    Limitations to history : None    Aamir Hinds is a 44 y.o. male with a history of hypertension, hyperlipidemia, tobacco abuse and obesity who presents to the emergency department today complaining of chest pain he has had since yesterday.  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  He states he has had a normal stress test and cardiac cath last year.  He describes his pain as a stabbing, constant, 10/10 pain that localizes to his left chest and radiates to his left mid back.  He denies shortness of breath, heart palpitations, diaphoresis, lightheadedness or dizziness.  He denies fevers, chills, coughing or recent illness.  He denies recent travel, unilateral leg swelling or history of coagulopathy        Nursing Notes were all reviewed and agreed with or any disagreements were addressed in the HPI.    REVIEW OF SYSTEMS :      Review of Systems   Constitutional:  Negative for chills and fever.   Respiratory:  Negative for cough, chest tightness, shortness of breath and wheezing.    Cardiovascular:  Positive for chest pain. Negative for palpitations and leg swelling.   Gastrointestinal:  Negative for abdominal pain, diarrhea, nausea and vomiting.   Genitourinary:  Negative for difficulty

## 2024-12-26 NOTE — ED PROVIDER NOTES
I did not perform history or physical on Aamir Hinds.  This patient was seen independently by an HENRY. I did review the EKG, which is documented below.     EKG  The Ekg interpreted by me in the absence of a cardiologist shows.  normal sinus rhythm with a rate of 71  Axis is   Normal  QTc is  normal  Intervals and Durations are unremarkable.      No specific ST-T wave changes appreciated.  No evidence of acute ischemia.   No significant change from prior EKG dated 8/24/2024          Juan A Minaya MD  12/26/24 9668

## 2024-12-27 LAB
EKG ATRIAL RATE: 71 BPM
EKG DIAGNOSIS: NORMAL
EKG P AXIS: 14 DEGREES
EKG P-R INTERVAL: 146 MS
EKG Q-T INTERVAL: 384 MS
EKG QRS DURATION: 88 MS
EKG QTC CALCULATION (BAZETT): 417 MS
EKG R AXIS: -6 DEGREES
EKG T AXIS: 7 DEGREES
EKG VENTRICULAR RATE: 71 BPM

## 2024-12-27 PROCEDURE — 93010 ELECTROCARDIOGRAM REPORT: CPT | Performed by: INTERNAL MEDICINE

## 2025-01-25 ENCOUNTER — APPOINTMENT (OUTPATIENT)
Dept: GENERAL RADIOLOGY | Age: 45
End: 2025-01-25
Payer: COMMERCIAL

## 2025-01-25 ENCOUNTER — HOSPITAL ENCOUNTER (EMERGENCY)
Age: 45
Discharge: HOME | End: 2025-01-25
Attending: EMERGENCY MEDICINE
Payer: COMMERCIAL

## 2025-01-25 VITALS
RESPIRATION RATE: 18 BRPM | HEART RATE: 66 BPM | DIASTOLIC BLOOD PRESSURE: 77 MMHG | OXYGEN SATURATION: 94 % | WEIGHT: 220 LBS | BODY MASS INDEX: 38.97 KG/M2 | SYSTOLIC BLOOD PRESSURE: 139 MMHG | TEMPERATURE: 98.7 F

## 2025-01-25 DIAGNOSIS — S63.92XA SPRAIN OF LEFT HAND, INITIAL ENCOUNTER: Primary | ICD-10-CM

## 2025-01-25 PROCEDURE — 99283 EMERGENCY DEPT VISIT LOW MDM: CPT

## 2025-01-25 PROCEDURE — 73130 X-RAY EXAM OF HAND: CPT

## 2025-01-25 ASSESSMENT — PAIN DESCRIPTION - LOCATION: LOCATION: HAND

## 2025-01-25 ASSESSMENT — PAIN SCALES - GENERAL: PAINLEVEL_OUTOF10: 8

## 2025-01-25 ASSESSMENT — PAIN DESCRIPTION - ORIENTATION: ORIENTATION: LEFT

## 2025-01-25 ASSESSMENT — PAIN - FUNCTIONAL ASSESSMENT: PAIN_FUNCTIONAL_ASSESSMENT: 0-10

## 2025-01-25 NOTE — ED PROVIDER NOTES
[facility] Emergency Department      Pt Name: Aamir Hinds  MRN: [id number]  Birthdate 1980  Date of evaluation: 1/25/2025  Provider: ADRIANA MATTHEWS MD  CHIEF COMPLAINT  Chief Complaint   Patient presents with    Hand Injury     Pt presents to the ER with the complaint of left hand pain. Pt states he was replacing a car battery and his hand got twisted. Pt is complaining of pain in the wrist area and on the dorsal side near his small finger.      HPI  Aamir Hinds is a 44 y.o. male who presents because of left hand injury.  He was working on a car battery and got his ring stuck.  He needed to use a lot of force to get it unstuck and has pain in his fourth finger radiated up into his hand.  He is right-handed.  Denies any numbness.  Injury happened prior to arrival.    REVIEW OF SYSTEMS:  No other injury, swelling Pertinent positives and negatives as per the HPI.  All other pertinent review of systems reviewed and negative.  Nursing notes reviewed.    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     Current Outpatient Medications on File Prior to Encounter   Medication Sig Dispense Refill    ketorolac (TORADOL) 10 MG tablet Take 1 tablet by mouth every 6 hours as needed for Pain 20 tablet 0    losartan-hydroCHLOROthiazide (HYZAAR) 100-12.5 MG per tablet       atorvastatin (LIPITOR) 20 MG tablet       dicyclomine (BENTYL) 10 MG capsule Take 10 mg by mouth 3 times daily as needed (Patient not taking: Reported on 5/9/2023)      hydroCHLOROthiazide (MICROZIDE) 12.5 MG capsule Take 12.5 mg by mouth daily (Patient not taking: Reported on

## 2025-02-05 ENCOUNTER — HOSPITAL ENCOUNTER (EMERGENCY)
Age: 45
Discharge: HOME | End: 2025-02-05
Payer: COMMERCIAL

## 2025-02-05 ENCOUNTER — APPOINTMENT (OUTPATIENT)
Dept: GENERAL RADIOLOGY | Age: 45
End: 2025-02-05
Payer: COMMERCIAL

## 2025-02-05 ENCOUNTER — APPOINTMENT (OUTPATIENT)
Dept: CT IMAGING | Age: 45
End: 2025-02-05
Payer: COMMERCIAL

## 2025-02-05 VITALS
SYSTOLIC BLOOD PRESSURE: 157 MMHG | DIASTOLIC BLOOD PRESSURE: 85 MMHG | RESPIRATION RATE: 20 BRPM | BODY MASS INDEX: 38.98 KG/M2 | HEIGHT: 63 IN | HEART RATE: 90 BPM | OXYGEN SATURATION: 94 % | TEMPERATURE: 98.2 F | WEIGHT: 220 LBS

## 2025-02-05 DIAGNOSIS — R10.32 ABDOMINAL PAIN, LEFT LOWER QUADRANT: ICD-10-CM

## 2025-02-05 DIAGNOSIS — R42 DIZZINESS: Primary | ICD-10-CM

## 2025-02-05 LAB
ALBUMIN SERPL-MCNC: 3.9 G/DL (ref 3.4–5)
ALBUMIN/GLOB SERPL: 1.2 {RATIO} (ref 1.1–2.2)
ALP SERPL-CCNC: 76 U/L (ref 40–129)
ALT SERPL-CCNC: 32 U/L (ref 10–40)
ANION GAP SERPL CALCULATED.3IONS-SCNC: 9 MMOL/L (ref 3–16)
AST SERPL-CCNC: 20 U/L (ref 15–37)
BASOPHILS # BLD: 0.1 K/UL (ref 0–0.2)
BASOPHILS NFR BLD: 1.3 %
BILIRUB SERPL-MCNC: 0.7 MG/DL (ref 0–1)
BILIRUB UR QL STRIP.AUTO: NEGATIVE
BUN SERPL-MCNC: 16 MG/DL (ref 7–20)
CALCIUM SERPL-MCNC: 9.6 MG/DL (ref 8.3–10.6)
CHLORIDE SERPL-SCNC: 106 MMOL/L (ref 99–110)
CLARITY UR: CLEAR
CO2 SERPL-SCNC: 25 MMOL/L (ref 21–32)
COLOR UR: YELLOW
CREAT SERPL-MCNC: 0.8 MG/DL (ref 0.9–1.3)
DEPRECATED RDW RBC AUTO: 13.4 % (ref 12.4–15.4)
EOSINOPHIL # BLD: 0.3 K/UL (ref 0–0.6)
EOSINOPHIL NFR BLD: 4.1 %
GFR SERPLBLD CREATININE-BSD FMLA CKD-EPI: >90 ML/MIN/{1.73_M2}
GLUCOSE SERPL-MCNC: 92 MG/DL (ref 70–99)
GLUCOSE UR STRIP.AUTO-MCNC: NEGATIVE MG/DL
HCT VFR BLD AUTO: 43.8 % (ref 40.5–52.5)
HGB BLD-MCNC: 15 G/DL (ref 13.5–17.5)
HGB UR QL STRIP.AUTO: NEGATIVE
KETONES UR STRIP.AUTO-MCNC: ABNORMAL MG/DL
LEUKOCYTE ESTERASE UR QL STRIP.AUTO: NEGATIVE
LIPASE SERPL-CCNC: 46 U/L (ref 13–60)
LYMPHOCYTES # BLD: 1.5 K/UL (ref 1–5.1)
LYMPHOCYTES NFR BLD: 19.3 %
MCH RBC QN AUTO: 29.7 PG (ref 26–34)
MCHC RBC AUTO-ENTMCNC: 34.3 G/DL (ref 31–36)
MCV RBC AUTO: 86.6 FL (ref 80–100)
MONOCYTES # BLD: 0.4 K/UL (ref 0–1.3)
MONOCYTES NFR BLD: 5.6 %
NEUTROPHILS # BLD: 5.3 K/UL (ref 1.7–7.7)
NEUTROPHILS NFR BLD: 69.7 %
NITRITE UR QL STRIP.AUTO: NEGATIVE
NT-PROBNP SERPL-MCNC: <36 PG/ML (ref 0–124)
PH UR STRIP.AUTO: 5.5 [PH] (ref 5–8)
PLATELET # BLD AUTO: 304 K/UL (ref 135–450)
PMV BLD AUTO: 8.2 FL (ref 5–10.5)
POTASSIUM SERPL-SCNC: 4.3 MMOL/L (ref 3.5–5.1)
PROT SERPL-MCNC: 7.1 G/DL (ref 6.4–8.2)
PROT UR STRIP.AUTO-MCNC: NEGATIVE MG/DL
RBC # BLD AUTO: 5.06 M/UL (ref 4.2–5.9)
SODIUM SERPL-SCNC: 140 MMOL/L (ref 136–145)
SP GR UR STRIP.AUTO: 1.02 (ref 1–1.03)
TROPONIN, HIGH SENSITIVITY: 7 NG/L (ref 0–22)
TROPONIN, HIGH SENSITIVITY: 8 NG/L (ref 0–22)
UA COMPLETE W REFLEX CULTURE PNL UR: ABNORMAL
UA DIPSTICK W REFLEX MICRO PNL UR: ABNORMAL
URN SPEC COLLECT METH UR: ABNORMAL
UROBILINOGEN UR STRIP-ACNC: 0.2 E.U./DL
WBC # BLD AUTO: 7.7 K/UL (ref 4–11)

## 2025-02-05 PROCEDURE — 93005 ELECTROCARDIOGRAM TRACING: CPT | Performed by: PHYSICIAN ASSISTANT

## 2025-02-05 PROCEDURE — 81003 URINALYSIS AUTO W/O SCOPE: CPT

## 2025-02-05 PROCEDURE — 80053 COMPREHEN METABOLIC PANEL: CPT

## 2025-02-05 PROCEDURE — 6360000004 HC RX CONTRAST MEDICATION: Performed by: PHYSICIAN ASSISTANT

## 2025-02-05 PROCEDURE — 6370000000 HC RX 637 (ALT 250 FOR IP): Performed by: PHYSICIAN ASSISTANT

## 2025-02-05 PROCEDURE — 84484 ASSAY OF TROPONIN QUANT: CPT

## 2025-02-05 PROCEDURE — 83880 ASSAY OF NATRIURETIC PEPTIDE: CPT

## 2025-02-05 PROCEDURE — 85025 COMPLETE CBC W/AUTO DIFF WBC: CPT

## 2025-02-05 PROCEDURE — 71045 X-RAY EXAM CHEST 1 VIEW: CPT

## 2025-02-05 PROCEDURE — 83690 ASSAY OF LIPASE: CPT

## 2025-02-05 PROCEDURE — 99285 EMERGENCY DEPT VISIT HI MDM: CPT

## 2025-02-05 PROCEDURE — 74177 CT ABD & PELVIS W/CONTRAST: CPT

## 2025-02-05 RX ORDER — ONDANSETRON 4 MG/1
4 TABLET, ORALLY DISINTEGRATING ORAL ONCE
Status: COMPLETED | OUTPATIENT
Start: 2025-02-05 | End: 2025-02-05

## 2025-02-05 RX ORDER — MECLIZINE HYDROCHLORIDE 25 MG/1
25 TABLET ORAL 3 TIMES DAILY PRN
Qty: 30 TABLET | Refills: 0 | Status: ON HOLD | OUTPATIENT
Start: 2025-02-05 | End: 2025-02-09 | Stop reason: HOSPADM

## 2025-02-05 RX ORDER — MECLIZINE HCL 12.5 MG 12.5 MG/1
25 TABLET ORAL ONCE
Status: COMPLETED | OUTPATIENT
Start: 2025-02-05 | End: 2025-02-05

## 2025-02-05 RX ORDER — ONDANSETRON 4 MG/1
4 TABLET, ORALLY DISINTEGRATING ORAL 3 TIMES DAILY PRN
Qty: 21 TABLET | Refills: 0 | Status: ON HOLD | OUTPATIENT
Start: 2025-02-05 | End: 2025-02-09 | Stop reason: HOSPADM

## 2025-02-05 RX ORDER — FLUTICASONE PROPIONATE 50 MCG
2 SPRAY, SUSPENSION (ML) NASAL DAILY
Qty: 16 G | Refills: 0 | Status: ON HOLD | OUTPATIENT
Start: 2025-02-05 | End: 2025-02-09 | Stop reason: HOSPADM

## 2025-02-05 RX ORDER — GUAIFENESIN 600 MG/1
600 TABLET, EXTENDED RELEASE ORAL 2 TIMES DAILY
Qty: 30 TABLET | Refills: 0 | Status: ON HOLD | OUTPATIENT
Start: 2025-02-05 | End: 2025-02-09 | Stop reason: HOSPADM

## 2025-02-05 RX ADMIN — ONDANSETRON 4 MG: 4 TABLET, ORALLY DISINTEGRATING ORAL at 14:57

## 2025-02-05 RX ADMIN — IOHEXOL 75 ML: 350 INJECTION, SOLUTION INTRAVENOUS at 15:52

## 2025-02-05 RX ADMIN — MECLIZINE 25 MG: 12.5 TABLET ORAL at 14:57

## 2025-02-05 ASSESSMENT — ENCOUNTER SYMPTOMS
CHEST TIGHTNESS: 0
NAUSEA: 0
CONSTIPATION: 0
PHOTOPHOBIA: 0
COUGH: 0
ABDOMINAL PAIN: 0
COLOR CHANGE: 0
BACK PAIN: 0
VOMITING: 0
SHORTNESS OF BREATH: 0
DIARRHEA: 0
ABDOMINAL DISTENTION: 0
RESPIRATORY NEGATIVE: 1

## 2025-02-05 ASSESSMENT — PAIN DESCRIPTION - PAIN TYPE: TYPE: ACUTE PAIN

## 2025-02-05 ASSESSMENT — PAIN DESCRIPTION - ONSET: ONSET: ON-GOING

## 2025-02-05 ASSESSMENT — PAIN - FUNCTIONAL ASSESSMENT
PAIN_FUNCTIONAL_ASSESSMENT: ACTIVITIES ARE NOT PREVENTED
PAIN_FUNCTIONAL_ASSESSMENT: 0-10

## 2025-02-05 ASSESSMENT — PAIN DESCRIPTION - FREQUENCY: FREQUENCY: CONTINUOUS

## 2025-02-05 ASSESSMENT — PAIN DESCRIPTION - DESCRIPTORS: DESCRIPTORS: ACHING

## 2025-02-05 ASSESSMENT — PAIN SCALES - GENERAL: PAINLEVEL_OUTOF10: 4

## 2025-02-05 ASSESSMENT — PAIN DESCRIPTION - ORIENTATION: ORIENTATION: LEFT;LOWER

## 2025-02-05 ASSESSMENT — PAIN DESCRIPTION - LOCATION: LOCATION: ABDOMEN

## 2025-02-05 NOTE — ED PROVIDER NOTES
I was available for consultation during patient's ED stay.  Patient was cared for by HENRY.  I did not evaluate or participate in patient care.    EKG Interpretation    Interpreted by emergency department physician    Rhythm: normal sinus   Rate: normal  Axis: normal  Ectopy: none  Conduction: normal  ST Segments: normal  T Waves: normal  Q Waves: none    Clinical Impression: Normal sinus rhythm, no significant T wave or ST changes.  Normal OK interval, normal QRS duration, normal QT QTC.  Normal axis.  No arrhythmia or signs of ischemia.  Otherwise normal EKG. Interpreted by myself.          MD Miguel Ocampo Stephen W, MD  02/05/25 0896

## 2025-02-05 NOTE — ED PROVIDER NOTES
[facility] EMERGENCY DEPARTMENT  EMERGENCY DEPARTMENT ENCOUNTER        Pt Name: Aamir Hinds  MRN: [id number]  Birthdate 1980  Date of evaluation: 2/5/2025  Provider: JOSE Lima  PCP: No primary care provider on file.  Note Started: 2:59 PM EST 2/5/25      HENRY. I have evaluated this patient.        CHIEF COMPLAINT       Chief Complaint   Patient presents with    Illness     Pt brought to the hospital due to feeling lightheaded, abdominal pain in the LLQ, denies any urinary symptoms at this time, with lower back pain.       HISTORY OF PRESENT ILLNESS: 1 or more Elements     History From: Patient  Limitations to history : None    Aamir Hinds is a 44 y.o. male with past medical history of hypertension, hyperlipidemia and tobacco use who presents ED with complaint of a illness.  Patient states he woke up today with feelings of dizziness.  He denies any lightheadedness, faintness or near syncope.  He reports when he stands up or changes positions he feels like he is spinning.  He reports that subsides after a few seconds to minutes.  He is able to ambulate unassisted.  He states he just feels \"off\".  He does report of the past couple days he has had some sinus congestion and sinus pressure.  He denies any headache.  Denies visual changes or speech disturbances.  Denies numbness or tingling.  Denies weakness to the arms or legs.  Denies any chest pain or shortness of breath.  Denies any nausea or vomiting.  Denies urinary symptoms or changes in bowel movements.  He does report some pain to his left lower quadrant of his abdomen.  He states he has had this in the past.  He is unsure of the etiology.  Reports he had extensive workup in the past including colonoscopy and upper GI scope.  He reports everything has been fine in the past he is unsure what causes these symptoms.  He has current left lower quadrant belly pain which he states feels similar to his previous episodes in the past.

## 2025-02-07 ENCOUNTER — APPOINTMENT (OUTPATIENT)
Dept: GENERAL RADIOLOGY | Age: 45
End: 2025-02-07
Payer: COMMERCIAL

## 2025-02-07 ENCOUNTER — HOSPITAL ENCOUNTER (EMERGENCY)
Age: 45
Discharge: LEFT BEFORE DISCHARGE | End: 2025-02-07
Payer: COMMERCIAL

## 2025-02-07 ENCOUNTER — APPOINTMENT (OUTPATIENT)
Dept: CT IMAGING | Age: 45
End: 2025-02-07
Payer: COMMERCIAL

## 2025-02-07 VITALS
DIASTOLIC BLOOD PRESSURE: 96 MMHG | OXYGEN SATURATION: 94 % | HEART RATE: 80 BPM | SYSTOLIC BLOOD PRESSURE: 159 MMHG | WEIGHT: 227 LBS | BODY MASS INDEX: 40.21 KG/M2 | RESPIRATION RATE: 18 BRPM | TEMPERATURE: 98.3 F

## 2025-02-07 DIAGNOSIS — R42 DIZZINESS: Primary | ICD-10-CM

## 2025-02-07 DIAGNOSIS — R93.0 ABNORMAL HEAD CT: ICD-10-CM

## 2025-02-07 DIAGNOSIS — H53.8 BLURRY VISION, BILATERAL: ICD-10-CM

## 2025-02-07 LAB
ALBUMIN SERPL-MCNC: 4.7 G/DL (ref 3.4–5)
ALBUMIN/GLOB SERPL: 1.9 {RATIO} (ref 1.1–2.2)
ALP SERPL-CCNC: 86 U/L (ref 40–129)
ALT SERPL-CCNC: 59 U/L (ref 10–40)
ANION GAP SERPL CALCULATED.3IONS-SCNC: 9 MMOL/L (ref 3–16)
AST SERPL-CCNC: 23 U/L (ref 15–37)
BASOPHILS # BLD: 0.1 K/UL (ref 0–0.2)
BASOPHILS NFR BLD: 1.4 %
BILIRUB SERPL-MCNC: 0.7 MG/DL (ref 0–1)
BUN SERPL-MCNC: 16 MG/DL (ref 7–20)
CALCIUM SERPL-MCNC: 10.1 MG/DL (ref 8.3–10.6)
CHLORIDE SERPL-SCNC: 103 MMOL/L (ref 99–110)
CO2 SERPL-SCNC: 27 MMOL/L (ref 21–32)
CREAT SERPL-MCNC: 0.8 MG/DL (ref 0.9–1.3)
DEPRECATED RDW RBC AUTO: 13.2 % (ref 12.4–15.4)
EKG ATRIAL RATE: 79 BPM
EKG ATRIAL RATE: 86 BPM
EKG DIAGNOSIS: NORMAL
EKG DIAGNOSIS: NORMAL
EKG P AXIS: 22 DEGREES
EKG P AXIS: 27 DEGREES
EKG P-R INTERVAL: 148 MS
EKG P-R INTERVAL: 154 MS
EKG Q-T INTERVAL: 348 MS
EKG Q-T INTERVAL: 358 MS
EKG QRS DURATION: 88 MS
EKG QRS DURATION: 88 MS
EKG QTC CALCULATION (BAZETT): 410 MS
EKG QTC CALCULATION (BAZETT): 416 MS
EKG R AXIS: -5 DEGREES
EKG R AXIS: 7 DEGREES
EKG T AXIS: 26 DEGREES
EKG T AXIS: 4 DEGREES
EKG VENTRICULAR RATE: 79 BPM
EKG VENTRICULAR RATE: 86 BPM
EOSINOPHIL # BLD: 0.4 K/UL (ref 0–0.6)
EOSINOPHIL NFR BLD: 6.2 %
GFR SERPLBLD CREATININE-BSD FMLA CKD-EPI: >90 ML/MIN/{1.73_M2}
GLUCOSE SERPL-MCNC: 94 MG/DL (ref 70–99)
HCT VFR BLD AUTO: 42.7 % (ref 40.5–52.5)
HGB BLD-MCNC: 15.1 G/DL (ref 13.5–17.5)
LYMPHOCYTES # BLD: 1.4 K/UL (ref 1–5.1)
LYMPHOCYTES NFR BLD: 23.2 %
MCH RBC QN AUTO: 30.4 PG (ref 26–34)
MCHC RBC AUTO-ENTMCNC: 35.3 G/DL (ref 31–36)
MCV RBC AUTO: 86.3 FL (ref 80–100)
MONOCYTES # BLD: 0.3 K/UL (ref 0–1.3)
MONOCYTES NFR BLD: 5.3 %
NEUTROPHILS # BLD: 3.8 K/UL (ref 1.7–7.7)
NEUTROPHILS NFR BLD: 63.9 %
PLATELET # BLD AUTO: 279 K/UL (ref 135–450)
PMV BLD AUTO: 8.1 FL (ref 5–10.5)
POTASSIUM SERPL-SCNC: 4.4 MMOL/L (ref 3.5–5.1)
PROT SERPL-MCNC: 7.2 G/DL (ref 6.4–8.2)
RBC # BLD AUTO: 4.96 M/UL (ref 4.2–5.9)
SODIUM SERPL-SCNC: 139 MMOL/L (ref 136–145)
TROPONIN, HIGH SENSITIVITY: 7 NG/L (ref 0–22)
TROPONIN, HIGH SENSITIVITY: 9 NG/L (ref 0–22)
WBC # BLD AUTO: 5.9 K/UL (ref 4–11)

## 2025-02-07 PROCEDURE — 6360000004 HC RX CONTRAST MEDICATION: Performed by: PHYSICIAN ASSISTANT

## 2025-02-07 PROCEDURE — 93010 ELECTROCARDIOGRAM REPORT: CPT | Performed by: INTERNAL MEDICINE

## 2025-02-07 PROCEDURE — 84484 ASSAY OF TROPONIN QUANT: CPT

## 2025-02-07 PROCEDURE — 71045 X-RAY EXAM CHEST 1 VIEW: CPT

## 2025-02-07 PROCEDURE — 93005 ELECTROCARDIOGRAM TRACING: CPT | Performed by: INTERNAL MEDICINE

## 2025-02-07 PROCEDURE — 85025 COMPLETE CBC W/AUTO DIFF WBC: CPT

## 2025-02-07 PROCEDURE — 80053 COMPREHEN METABOLIC PANEL: CPT

## 2025-02-07 PROCEDURE — 70450 CT HEAD/BRAIN W/O DYE: CPT

## 2025-02-07 PROCEDURE — 70498 CT ANGIOGRAPHY NECK: CPT

## 2025-02-07 RX ORDER — IOPAMIDOL 755 MG/ML
75 INJECTION, SOLUTION INTRAVASCULAR
Status: DISCONTINUED | OUTPATIENT
Start: 2025-02-07 | End: 2025-02-07

## 2025-02-07 RX ADMIN — IOHEXOL 75 ML: 350 INJECTION, SOLUTION INTRAVENOUS at 15:22

## 2025-02-07 ASSESSMENT — ENCOUNTER SYMPTOMS
EYE PAIN: 0
RHINORRHEA: 0
ABDOMINAL PAIN: 0
SORE THROAT: 0
CONSTIPATION: 0
SHORTNESS OF BREATH: 0
COUGH: 0
VOMITING: 0
DIARRHEA: 0
BACK PAIN: 0
NAUSEA: 0

## 2025-02-07 ASSESSMENT — PAIN - FUNCTIONAL ASSESSMENT: PAIN_FUNCTIONAL_ASSESSMENT: 0-10

## 2025-02-07 ASSESSMENT — VISUAL ACUITY
OU: 20/10
OD: 20/15
OS: 20/50

## 2025-02-07 ASSESSMENT — PAIN SCALES - GENERAL: PAINLEVEL_OUTOF10: 2

## 2025-02-07 NOTE — ED PROVIDER NOTES
Neurological:  Positive for dizziness. Negative for tremors, seizures, syncope, facial asymmetry, speech difficulty, weakness, light-headedness, numbness and headaches.       Positives and Pertinent negatives as per HPI.     SURGICAL HISTORY     Past Surgical History:   Procedure Laterality Date    FOOT SURGERY      HAND SURGERY      HERNIA REPAIR      HERNIA REPAIR  03/2023    KNEE ARTHROSCOPY         CURRENTMEDICATIONS       Previous Medications    ALBUTEROL SULFATE HFA (PROVENTIL HFA) 108 (90 BASE) MCG/ACT INHALER    Inhale 2 puffs into the lungs every 4 hours as needed for Wheezing or Shortness of Breath (Space out to every 6 hours as symptoms improve) Space out to every 6 hours as symptoms improve.    ATORVASTATIN (LIPITOR) 20 MG TABLET        DICYCLOMINE (BENTYL) 10 MG CAPSULE    Take 10 mg by mouth 3 times daily as needed    FLUTICASONE (FLONASE) 50 MCG/ACT NASAL SPRAY    2 sprays by Each Nostril route daily    GUAIFENESIN (MUCINEX) 600 MG EXTENDED RELEASE TABLET    Take 1 tablet by mouth 2 times daily for 15 days    HYDROCHLOROTHIAZIDE (MICROZIDE) 12.5 MG CAPSULE    Take 12.5 mg by mouth daily    KETOROLAC (TORADOL) 10 MG TABLET    Take 1 tablet by mouth every 6 hours as needed for Pain    LOSARTAN-HYDROCHLOROTHIAZIDE (HYZAAR) 100-12.5 MG PER TABLET        MECLIZINE (ANTIVERT) 25 MG TABLET    Take 1 tablet by mouth 3 times daily as needed for Dizziness    ONDANSETRON (ZOFRAN-ODT) 4 MG DISINTEGRATING TABLET    Take 1 tablet by mouth 3 times daily as needed for Nausea or Vomiting       ALLERGIES     Azithromycin, Ambien [zolpidem tartrate], Codeine, Lidocaine hcl, Procaine hcl, Aloe, and Tramadol    FAMILYHISTORY     History reviewed. No pertinent family history.     SOCIAL HISTORY       Social History     Tobacco Use    Smoking status: Some Days     Types: Pipe, Cigars    Smokeless tobacco: Never   Vaping Use    Vaping status: Every Day   Substance Use Topics    Alcohol use: Yes     Alcohol/week: 0.0  None    Records Reviewed : None    CC/HPI Summary, DDx, ED Course, and Reassessment: Patient with a history of hypertension, hyperlipidemia, seizure disorder and tobacco abuse presented to the emergency department today stating for the last 3 days he has had episodes of dizziness where he feels off balance as well as blurry vision bilaterally.  Patient is alert and oriented x 3 with GCS 15 on arrival.  He denies headache, lightheadedness, numbness, tingling or weakness in his extremities.  He denies chest pain or shortness of breath.  He has no GI complaints.    EKG completed shows no signs of acute ischemia or infarction.  Troponin is 9.  Chest x-ray shows no acute cardiopulmonary disease.    CBC, BMP and LFTs are unremarkable.    CT head shows no acute intracranial abnormality.  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   CTA head and neck shows no flow-limiting stenosis or occlusion within the head or neck.    Disposition Considerations (include 1 Tests not done, Shared Decision Making, Pt Expectation of Test or Tx.): I had a lengthy discussion with the patient and family member regarding testing completed in the emergency department today as well as the results.  I do recommend admission for further evaluation and treatment of his symptoms as well as MRI of his brain.  Patient declines admission stating \"I do not like hospitals\" I attempted to reason with the patient and family however patient refuses hospital admission.  He does not have a family doctor and is unsure whether he has insurance.  He will be given a \"no PCP\" referral.  Patient is given very strict return precautions    The patient has decided to leave against medical advice. The patient is awake and alert, non-intoxicated, non-suicidal, nonpsychotic. There is no medical condition that prevents or inhibits their understanding of the risks/benefits of their decision. I discussed

## 2025-02-08 ENCOUNTER — HOSPITAL ENCOUNTER (EMERGENCY)
Age: 45
Discharge: ANOTHER HOSPITAL | End: 2025-02-09
Attending: INTERNAL MEDICINE
Payer: COMMERCIAL

## 2025-02-08 DIAGNOSIS — G93.5 ARNOLD-CHIARI MALFORMATION, TYPE I (HCC): Primary | ICD-10-CM

## 2025-02-08 DIAGNOSIS — R42 VERTIGO: ICD-10-CM

## 2025-02-08 DIAGNOSIS — H53.8 BLURRED VISION, BILATERAL: ICD-10-CM

## 2025-02-08 LAB
AMPHETAMINES UR QL SCN>1000 NG/ML: NORMAL
ANION GAP SERPL CALCULATED.3IONS-SCNC: 12 MMOL/L (ref 3–16)
BACTERIA URNS QL MICRO: NORMAL /HPF
BARBITURATES UR QL SCN>200 NG/ML: NORMAL
BASOPHILS # BLD: 0.1 K/UL (ref 0–0.2)
BASOPHILS NFR BLD: 1.3 %
BENZODIAZ UR QL SCN>200 NG/ML: NORMAL
BILIRUB UR QL STRIP.AUTO: NEGATIVE
BUN SERPL-MCNC: 23 MG/DL (ref 7–20)
CALCIUM SERPL-MCNC: 10.1 MG/DL (ref 8.3–10.6)
CANNABINOIDS UR QL SCN>50 NG/ML: NORMAL
CHLORIDE SERPL-SCNC: 102 MMOL/L (ref 99–110)
CLARITY UR: ABNORMAL
CO2 SERPL-SCNC: 26 MMOL/L (ref 21–32)
COCAINE UR QL SCN: NORMAL
COLOR UR: YELLOW
CREAT SERPL-MCNC: 0.8 MG/DL (ref 0.9–1.3)
DEPRECATED RDW RBC AUTO: 13.1 % (ref 12.4–15.4)
DRUG SCREEN COMMENT UR-IMP: NORMAL
EOSINOPHIL # BLD: 0.4 K/UL (ref 0–0.6)
EOSINOPHIL NFR BLD: 5.7 %
EPI CELLS #/AREA URNS AUTO: 0 /HPF (ref 0–5)
ETHANOLAMINE SERPL-MCNC: NORMAL MG/DL (ref 0–0.08)
FENTANYL SCREEN, URINE: NORMAL
GFR SERPLBLD CREATININE-BSD FMLA CKD-EPI: >90 ML/MIN/{1.73_M2}
GLUCOSE SERPL-MCNC: 92 MG/DL (ref 70–99)
GLUCOSE UR STRIP.AUTO-MCNC: NEGATIVE MG/DL
HCT VFR BLD AUTO: 42 % (ref 40.5–52.5)
HGB BLD-MCNC: 14.7 G/DL (ref 13.5–17.5)
HGB UR QL STRIP.AUTO: NEGATIVE
HYALINE CASTS #/AREA URNS AUTO: 0 /LPF (ref 0–8)
KETONES UR STRIP.AUTO-MCNC: NEGATIVE MG/DL
LEUKOCYTE ESTERASE UR QL STRIP.AUTO: NEGATIVE
LYMPHOCYTES # BLD: 1.6 K/UL (ref 1–5.1)
LYMPHOCYTES NFR BLD: 21.7 %
MCH RBC QN AUTO: 30.1 PG (ref 26–34)
MCHC RBC AUTO-ENTMCNC: 35.1 G/DL (ref 31–36)
MCV RBC AUTO: 85.8 FL (ref 80–100)
METHADONE UR QL SCN>300 NG/ML: NORMAL
MONOCYTES # BLD: 0.4 K/UL (ref 0–1.3)
MONOCYTES NFR BLD: 6.2 %
NEUTROPHILS # BLD: 4.7 K/UL (ref 1.7–7.7)
NEUTROPHILS NFR BLD: 65.1 %
NITRITE UR QL STRIP.AUTO: NEGATIVE
OPIATES UR QL SCN>300 NG/ML: NORMAL
OXYCODONE UR QL SCN: NORMAL
PCP UR QL SCN>25 NG/ML: NORMAL
PH UR STRIP.AUTO: 7 [PH] (ref 5–8)
PH UR STRIP: 7 [PH]
PLATELET # BLD AUTO: 307 K/UL (ref 135–450)
PMV BLD AUTO: 8 FL (ref 5–10.5)
POTASSIUM SERPL-SCNC: 4.1 MMOL/L (ref 3.5–5.1)
PROT UR STRIP.AUTO-MCNC: NEGATIVE MG/DL
RBC # BLD AUTO: 4.89 M/UL (ref 4.2–5.9)
RBC CLUMPS #/AREA URNS AUTO: 0 /HPF (ref 0–4)
SODIUM SERPL-SCNC: 140 MMOL/L (ref 136–145)
SP GR UR STRIP.AUTO: 1.02 (ref 1–1.03)
TROPONIN, HIGH SENSITIVITY: 7 NG/L (ref 0–22)
UA COMPLETE W REFLEX CULTURE PNL UR: ABNORMAL
UA DIPSTICK W REFLEX MICRO PNL UR: YES
URN SPEC COLLECT METH UR: ABNORMAL
UROBILINOGEN UR STRIP-ACNC: 0.2 E.U./DL
WBC # BLD AUTO: 7.2 K/UL (ref 4–11)
WBC #/AREA URNS AUTO: 0 /HPF (ref 0–5)

## 2025-02-08 PROCEDURE — 99285 EMERGENCY DEPT VISIT HI MDM: CPT

## 2025-02-08 PROCEDURE — 85025 COMPLETE CBC W/AUTO DIFF WBC: CPT

## 2025-02-08 PROCEDURE — 93005 ELECTROCARDIOGRAM TRACING: CPT | Performed by: INTERNAL MEDICINE

## 2025-02-08 PROCEDURE — 80307 DRUG TEST PRSMV CHEM ANLYZR: CPT

## 2025-02-08 PROCEDURE — 81001 URINALYSIS AUTO W/SCOPE: CPT

## 2025-02-08 PROCEDURE — 84484 ASSAY OF TROPONIN QUANT: CPT

## 2025-02-08 PROCEDURE — 82077 ASSAY SPEC XCP UR&BREATH IA: CPT

## 2025-02-08 PROCEDURE — 80048 BASIC METABOLIC PNL TOTAL CA: CPT

## 2025-02-08 ASSESSMENT — VISUAL ACUITY
OU: 20/25
OS: 20/30

## 2025-02-09 ENCOUNTER — HOSPITAL ENCOUNTER (INPATIENT)
Age: 45
LOS: 1 days | Discharge: HOME | DRG: 081 | End: 2025-02-09
Attending: INTERNAL MEDICINE | Admitting: INTERNAL MEDICINE
Payer: COMMERCIAL

## 2025-02-09 ENCOUNTER — APPOINTMENT (OUTPATIENT)
Dept: MRI IMAGING | Age: 45
DRG: 081 | End: 2025-02-09
Attending: INTERNAL MEDICINE
Payer: COMMERCIAL

## 2025-02-09 VITALS
HEART RATE: 95 BPM | WEIGHT: 228 LBS | TEMPERATURE: 99 F | OXYGEN SATURATION: 99 % | SYSTOLIC BLOOD PRESSURE: 150 MMHG | BODY MASS INDEX: 40.4 KG/M2 | RESPIRATION RATE: 18 BRPM | HEIGHT: 63 IN | DIASTOLIC BLOOD PRESSURE: 90 MMHG

## 2025-02-09 VITALS
SYSTOLIC BLOOD PRESSURE: 135 MMHG | TEMPERATURE: 98 F | HEIGHT: 63 IN | BODY MASS INDEX: 40.4 KG/M2 | WEIGHT: 228 LBS | RESPIRATION RATE: 18 BRPM | DIASTOLIC BLOOD PRESSURE: 77 MMHG | HEART RATE: 76 BPM | OXYGEN SATURATION: 99 %

## 2025-02-09 DIAGNOSIS — I63.9 CEREBROVASCULAR ACCIDENT (CVA), UNSPECIFIED MECHANISM (HCC): Primary | ICD-10-CM

## 2025-02-09 PROBLEM — H81.10 BPPV (BENIGN PAROXYSMAL POSITIONAL VERTIGO): Status: ACTIVE | Noted: 2025-02-09

## 2025-02-09 PROBLEM — R42 VERTIGO: Status: ACTIVE | Noted: 2025-02-09

## 2025-02-09 PROBLEM — G93.5 CHIARI MALFORMATION TYPE I (HCC): Status: ACTIVE | Noted: 2025-02-09

## 2025-02-09 LAB
ANION GAP SERPL CALCULATED.3IONS-SCNC: 11 MMOL/L (ref 3–16)
BUN SERPL-MCNC: 18 MG/DL (ref 7–20)
CALCIUM SERPL-MCNC: 9.6 MG/DL (ref 8.3–10.6)
CHLORIDE SERPL-SCNC: 104 MMOL/L (ref 99–110)
CHOLEST SERPL-MCNC: 273 MG/DL (ref 0–199)
CO2 SERPL-SCNC: 25 MMOL/L (ref 21–32)
CREAT SERPL-MCNC: 0.8 MG/DL (ref 0.9–1.3)
EKG ATRIAL RATE: 100 BPM
EKG DIAGNOSIS: NORMAL
EKG P AXIS: 28 DEGREES
EKG P-R INTERVAL: 148 MS
EKG Q-T INTERVAL: 338 MS
EKG QRS DURATION: 90 MS
EKG QTC CALCULATION (BAZETT): 436 MS
EKG R AXIS: 0 DEGREES
EKG T AXIS: 21 DEGREES
EKG VENTRICULAR RATE: 100 BPM
GFR SERPLBLD CREATININE-BSD FMLA CKD-EPI: >90 ML/MIN/{1.73_M2}
GLUCOSE SERPL-MCNC: 115 MG/DL (ref 70–99)
HDLC SERPL-MCNC: 36 MG/DL (ref 40–60)
LDLC SERPL CALC-MCNC: 193 MG/DL
POTASSIUM SERPL-SCNC: 4 MMOL/L (ref 3.5–5.1)
SODIUM SERPL-SCNC: 140 MMOL/L (ref 136–145)
TRIGL SERPL-MCNC: 218 MG/DL (ref 0–150)
VLDLC SERPL CALC-MCNC: 44 MG/DL

## 2025-02-09 PROCEDURE — 93010 ELECTROCARDIOGRAM REPORT: CPT | Performed by: INTERNAL MEDICINE

## 2025-02-09 PROCEDURE — 6370000000 HC RX 637 (ALT 250 FOR IP): Performed by: INTERNAL MEDICINE

## 2025-02-09 PROCEDURE — 99223 1ST HOSP IP/OBS HIGH 75: CPT | Performed by: PSYCHIATRY & NEUROLOGY

## 2025-02-09 PROCEDURE — A9579 GAD-BASE MR CONTRAST NOS,1ML: HCPCS

## 2025-02-09 PROCEDURE — 6360000004 HC RX CONTRAST MEDICATION

## 2025-02-09 PROCEDURE — 80048 BASIC METABOLIC PNL TOTAL CA: CPT

## 2025-02-09 PROCEDURE — 70553 MRI BRAIN STEM W/O & W/DYE: CPT

## 2025-02-09 PROCEDURE — 36415 COLL VENOUS BLD VENIPUNCTURE: CPT

## 2025-02-09 PROCEDURE — G0378 HOSPITAL OBSERVATION PER HR: HCPCS

## 2025-02-09 PROCEDURE — 80061 LIPID PANEL: CPT

## 2025-02-09 PROCEDURE — 2060000000 HC ICU INTERMEDIATE R&B

## 2025-02-09 PROCEDURE — 83036 HEMOGLOBIN GLYCOSYLATED A1C: CPT

## 2025-02-09 RX ORDER — ASPIRIN 300 MG/1
300 SUPPOSITORY RECTAL DAILY
Status: DISCONTINUED | OUTPATIENT
Start: 2025-02-09 | End: 2025-02-09 | Stop reason: HOSPADM

## 2025-02-09 RX ORDER — SODIUM CHLORIDE 0.9 % (FLUSH) 0.9 %
5-40 SYRINGE (ML) INJECTION EVERY 12 HOURS SCHEDULED
Status: DISCONTINUED | OUTPATIENT
Start: 2025-02-09 | End: 2025-02-09 | Stop reason: HOSPADM

## 2025-02-09 RX ORDER — ONDANSETRON 4 MG/1
4 TABLET, ORALLY DISINTEGRATING ORAL EVERY 8 HOURS PRN
Status: DISCONTINUED | OUTPATIENT
Start: 2025-02-09 | End: 2025-02-09 | Stop reason: HOSPADM

## 2025-02-09 RX ORDER — LABETALOL HYDROCHLORIDE 5 MG/ML
10 INJECTION, SOLUTION INTRAVENOUS EVERY 4 HOURS PRN
Status: DISCONTINUED | OUTPATIENT
Start: 2025-02-09 | End: 2025-02-09 | Stop reason: HOSPADM

## 2025-02-09 RX ORDER — SODIUM CHLORIDE 9 MG/ML
INJECTION, SOLUTION INTRAVENOUS PRN
Status: DISCONTINUED | OUTPATIENT
Start: 2025-02-09 | End: 2025-02-09 | Stop reason: HOSPADM

## 2025-02-09 RX ORDER — ONDANSETRON 2 MG/ML
4 INJECTION INTRAMUSCULAR; INTRAVENOUS EVERY 6 HOURS PRN
Status: DISCONTINUED | OUTPATIENT
Start: 2025-02-09 | End: 2025-02-09 | Stop reason: HOSPADM

## 2025-02-09 RX ORDER — ASPIRIN 81 MG/1
81 TABLET, CHEWABLE ORAL DAILY
Status: DISCONTINUED | OUTPATIENT
Start: 2025-02-09 | End: 2025-02-09 | Stop reason: HOSPADM

## 2025-02-09 RX ORDER — ROSUVASTATIN CALCIUM 20 MG/1
40 TABLET, COATED ORAL NIGHTLY
Status: DISCONTINUED | OUTPATIENT
Start: 2025-02-09 | End: 2025-02-09 | Stop reason: HOSPADM

## 2025-02-09 RX ORDER — POLYETHYLENE GLYCOL 3350 17 G/17G
17 POWDER, FOR SOLUTION ORAL DAILY PRN
Status: DISCONTINUED | OUTPATIENT
Start: 2025-02-09 | End: 2025-02-09 | Stop reason: HOSPADM

## 2025-02-09 RX ORDER — ENOXAPARIN SODIUM 100 MG/ML
30 INJECTION SUBCUTANEOUS 2 TIMES DAILY
Status: DISCONTINUED | OUTPATIENT
Start: 2025-02-09 | End: 2025-02-09 | Stop reason: HOSPADM

## 2025-02-09 RX ORDER — SODIUM CHLORIDE 0.9 % (FLUSH) 0.9 %
5-40 SYRINGE (ML) INJECTION PRN
Status: DISCONTINUED | OUTPATIENT
Start: 2025-02-09 | End: 2025-02-09 | Stop reason: HOSPADM

## 2025-02-09 RX ADMIN — GADOTERIDOL 20 ML: 279.3 INJECTION, SOLUTION INTRAVENOUS at 09:46

## 2025-02-09 RX ADMIN — ASPIRIN 81 MG: 81 TABLET, CHEWABLE ORAL at 10:26

## 2025-02-09 ASSESSMENT — PAIN SCALES - GENERAL
PAINLEVEL_OUTOF10: 0

## 2025-02-09 NOTE — PROGRESS NOTES
4 Eyes Skin Assessment     NAME:  Aamir Hinds  YOB: 1980  MEDICAL RECORD NUMBER:  [id number]    The patient is being assessed for  Admission    I agree that at least one RN has performed a thorough Head to Toe Skin Assessment on the patient. ALL assessment sites listed below have been assessed.      Areas assessed by both nurses:    Head, Face, Ears, Shoulders, Back, Chest, Arms, Elbows, Hands, Sacrum. Buttock, Coccyx, Ischium, Legs. Feet and Heels, and Under Medical Devices         Does the Patient have a Wound? No noted wound(s)       Domingo Prevention initiated by RN: No  Wound Care Orders initiated by RN: No    Pressure Injury (Stage 3,4, Unstageable, DTI, NWPT, and Complex wounds) if present, place Wound referral order by RN under : No    New Ostomies, if present place, Ostomy referral order under : No     Nurse 1 eSignature: Electronically signed by Glenna Flores RN on 2/9/25 at 5:56 AM EST    **SHARE this note so that the co-signing nurse can place an eSignature**    Nurse 2 eSignature: {Esignature:[id number]}

## 2025-02-09 NOTE — PLAN OF CARE
Problem: Neurosensory - Adult  Goal: Achieves stable or improved neurological status  2/9/2025 0736 by Jeaneth Feliciano, RN  Pt has had no acute neuro changes that were noted.      Problem: Discharge Planning  Goal: Discharge to home or other facility with appropriate resources  2/9/2025 0736 by Jeaneth Feliciano, RN  Outcome: Progressing  Pt involved in discharge planning. Barriers to discharge discussed with pt. Discharge learning needs identified. Discussed with pt any additional needed resources and transportation plans.

## 2025-02-09 NOTE — CONSULTS
(from the past 72 hour(s))   EKG 12 Lead    Collection Time: 02/07/25  1:25 PM   Result Value Ref Range    Ventricular Rate 79 BPM    Atrial Rate 79 BPM    P-R Interval 154 ms    QRS Duration 88 ms    Q-T Interval 358 ms    QTc Calculation (Bazett) 410 ms    P Axis 22 degrees    R Axis 7 degrees    T Axis 26 degrees    Diagnosis       Normal sinus rhythmNormal ECGConfirmed by CIARAN TREVINO MD ([id number]) on 2/7/2025 5:11:51 PM   CBC with Auto Differential    Collection Time: 02/07/25  2:40 PM   Result Value Ref Range    WBC 5.9 4.0 - 11.0 K/uL    RBC 4.96 4.20 - 5.90 M/uL    Hemoglobin 15.1 13.5 - 17.5 g/dL    Hematocrit 42.7 40.5 - 52.5 %    MCV 86.3 80.0 - 100.0 fL    MCH 30.4 26.0 - 34.0 pg    MCHC 35.3 31.0 - 36.0 g/dL    RDW 13.2 12.4 - 15.4 %    Platelets 279 135 - 450 K/uL    MPV 8.1 5.0 - 10.5 fL    Neutrophils % 63.9 %    Lymphocytes % 23.2 %    Monocytes % 5.3 %    Eosinophils % 6.2 %    Basophils % 1.4 %    Neutrophils Absolute 3.8 1.7 - 7.7 K/uL    Lymphocytes Absolute 1.4 1.0 - 5.1 K/uL    Monocytes Absolute 0.3 0.0 - 1.3 K/uL    Eosinophils Absolute 0.4 0.0 - 0.6 K/uL    Basophils Absolute 0.1 0.0 - 0.2 K/uL   Comprehensive Metabolic Panel    Collection Time: 02/07/25  2:40 PM   Result Value Ref Range    Sodium 139 136 - 145 mmol/L    Potassium 4.4 3.5 - 5.1 mmol/L    Chloride 103 99 - 110 mmol/L    CO2 27 21 - 32 mmol/L    Anion Gap 9 3 - 16    Glucose 94 70 - 99 mg/dL    BUN 16 7 - 20 mg/dL    Creatinine 0.8 (L) 0.9 - 1.3 mg/dL    Est, Glom Filt Rate >90 >60    Calcium 10.1 8.3 - 10.6 mg/dL    Total Protein 7.2 6.4 - 8.2 g/dL    Albumin 4.7 3.4 - 5.0 g/dL    Albumin/Globulin Ratio 1.9 1.1 - 2.2    Total Bilirubin 0.7 0.0 - 1.0 mg/dL    Alkaline Phosphatase 86 40 - 129 U/L    ALT 59 (H) 10 - 40 U/L    AST 23 15 - 37 U/L   Troponin    Collection Time: 02/07/25  2:40 PM   Result Value Ref Range    Troponin, High Sensitivity 9 0 - 22 ng/L   Troponin    Collection Time: 02/07/25  5:53 PM   Result Value Ref  9, 2025

## 2025-02-09 NOTE — ED PROVIDER NOTES
rash.    Procedures      EKG  The Ekg interpreted by me in the absence of a cardiologist shows.  normal sinus rhythm with a rate of 100  Axis is   Normal  QTc is  normal  Intervals and Durations are unremarkable.      No specific ST-T wave changes appreciated.  No evidence of acute ischemia.   No significant change from prior EKG dated 2/7/2025    Radiology  No orders to display       Labs  Results for orders placed or performed during the hospital encounter of 02/08/25   CBC with Auto Differential   Result Value Ref Range    WBC 7.2 4.0 - 11.0 K/uL    RBC 4.89 4.20 - 5.90 M/uL    Hemoglobin 14.7 13.5 - 17.5 g/dL    Hematocrit 42.0 40.5 - 52.5 %    MCV 85.8 80.0 - 100.0 fL    MCH 30.1 26.0 - 34.0 pg    MCHC 35.1 31.0 - 36.0 g/dL    RDW 13.1 12.4 - 15.4 %    Platelets 307 135 - 450 K/uL    MPV 8.0 5.0 - 10.5 fL    Neutrophils % 65.1 %    Lymphocytes % 21.7 %    Monocytes % 6.2 %    Eosinophils % 5.7 %    Basophils % 1.3 %    Neutrophils Absolute 4.7 1.7 - 7.7 K/uL    Lymphocytes Absolute 1.6 1.0 - 5.1 K/uL    Monocytes Absolute 0.4 0.0 - 1.3 K/uL    Eosinophils Absolute 0.4 0.0 - 0.6 K/uL    Basophils Absolute 0.1 0.0 - 0.2 K/uL   Basic Metabolic Panel   Result Value Ref Range    Sodium 140 136 - 145 mmol/L    Potassium 4.1 3.5 - 5.1 mmol/L    Chloride 102 99 - 110 mmol/L    CO2 26 21 - 32 mmol/L    Anion Gap 12 3 - 16    Glucose 92 70 - 99 mg/dL    BUN 23 (H) 7 - 20 mg/dL    Creatinine 0.8 (L) 0.9 - 1.3 mg/dL    Est, Glom Filt Rate >90 >60    Calcium 10.1 8.3 - 10.6 mg/dL   Troponin   Result Value Ref Range    Troponin, High Sensitivity 7 0 - 22 ng/L   Ethanol   Result Value Ref Range    Ethanol Lvl None Detected mg/dL   Urinalysis with Reflex to Culture    Specimen: Urine   Result Value Ref Range    Color, UA Yellow Straw/Yellow    Clarity, UA CLOUDY (A) Clear    Glucose, Ur Negative Negative mg/dL    Bilirubin, Urine Negative Negative    Ketones, Urine Negative Negative mg/dL    Specific Gravity, UA 1.020 1.005 -

## 2025-02-09 NOTE — H&P
Hospital Medicine History & Physical      Date of Admission: 2/9/2025    Date of Service:  Pt seen/examined on 02/09/25     [x]Admitted to Inpatient with expected LOS greater than two midnights due to medical therapy.  []Placed in Observation status.    Chief Admission Complaint: Dizziness    Presenting Admission History:      44 y.o. male with PMHx significant for obesity, hypertension, hyperlipidemia and tobacco use disorder who presented to ED with a complaint of vertigo.  Patient was seen in ED 2 days ago for evaluation of dizzy spell and visual changes.  Patient left AMA.  Patient continued to have lightheadedness and unsteady gait and being off balance which prompted him to come back to the ED for further evaluation.  Patient denies any loss of consciousness or falls.  No nausea vomiting or diarrhea.  Patient denies any focal weakness or numbness.  In ED CT head was obtained and revealed cerebellar tonsils below the foramen magnum which is concerning for type I A Chiari malformation.  Neurosurgery was consulted and the patient is transferred to the [facility] for further neurosurgical evaluation.  Patient currently denies any dizziness or headaches.  He offers no other complaints at this    ROS:     Review of 10 systems is negative except what is outlined in HPI.     Assessment:  Dizzy spells/vertigo.  Possible type Ia Chiari malformation.  Essential hypertension.  Hyperlipidemia.  Morbid obesity, Body mass index is 40.39 kg/m².   Tobacco use disorder    Plan:    Patient admitted under inpatient status.  Obtain brain MRI for further evaluation of cerebellar abnormalities noted on CT head.  PT/OT evaluation and treatment.  Neurosurgery consultation.  Neurochecks per protocol.  Patient is not on any medication for hypertension or hyperlipidemia at this time.  Smoking cessation counseling provided greater than 10 minutes.  SQ Lovenox for DVT prophylaxis    Physical Exam Performed:      BP (!) 154/94    ARTERIES: No dissection, arterial injury, or hemodynamically significant stenosis by NASCET criteria. VERTEBRAL ARTERIES: No dissection, arterial injury, or significant stenosis. SOFT TISSUES: The lung apices are clear.  No cervical or superior mediastinal lymphadenopathy.  Scattered punctate palatine tonsilliths.  The larynx is unremarkable.  No acute abnormality of the salivary and thyroid glands. BONES: No acute osseous abnormality. CTA HEAD: ANTERIOR CIRCULATION: No significant stenosis of the intracranial internal carotid, anterior cerebral, or middle cerebral arteries. No aneurysm. POSTERIOR CIRCULATION: Fetal origin left PCA.  Junctional dilatation of the basilar tip.  No significant stenosis of the basilar or posterior cerebral arteries. No aneurysm. OTHER: No dural venous sinus thrombosis on this non-dedicated study. BRAIN: No mass effect or midline shift. No extra-axial fluid collection. The gray-white differentiation is maintained.     No flow limiting stenosis or occlusion within the head or neck.     XR CHEST PORTABLE    Result Date: 2/7/2025  EXAMINATION: ONE XRAY VIEW OF THE CHEST 2/7/2025 2:35 pm COMPARISON: None. HISTORY: ORDERING SYSTEM PROVIDED HISTORY: SOB TECHNOLOGIST PROVIDED HISTORY: Reason for exam:->SOB FINDINGS: Normal cardiomediastinal silhouette.  No acute airspace infiltrate.  No pneumothorax or pleural effusion     No acute cardiopulmonary findings     CT ABDOMEN PELVIS W IV CONTRAST Additional Contrast? None    Result Date: 2/5/2025  EXAMINATION: CT OF THE ABDOMEN AND PELVIS WITH CONTRAST 2/5/2025 3:39 pm TECHNIQUE: CT of the abdomen and pelvis was performed with the administration of intravenous contrast. Multiplanar reformatted images are provided for review. Automated exposure control, iterative reconstruction, and/or weight based adjustment of the mA/kV was utilized to reduce the radiation dose to as low as reasonably achievable. COMPARISON: 05/09/2023 HISTORY: ORDERING SYSTEM

## 2025-02-09 NOTE — CONSULTS
[facility]   Neurosurgery Consultation        Patient: Aamir Hinds  Consultant: Son Cabrera MD, PhD        HPI: Aamir Hinds is a 44 y.o. male patient being seen for complaints of dizziness and vision changes that have been intermittent. Patient was seen yesterday in the ER and he left AGAINST MEDICAL ADVICE because he had things to take care of. Patient states at times he feels off balance. Occasionally he gets blurred vision. Patient denies chest pain shortness of breath. Patient denies fever and chills. Patient denies any recent head injury or trauma .    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     Past Surgical History:   Procedure Laterality Date    FOOT SURGERY      HAND SURGERY      HERNIA REPAIR      HERNIA REPAIR  03/2023    KNEE ARTHROSCOPY       Azithromycin, Ambien [zolpidem tartrate], Codeine, Lidocaine hcl, Procaine hcl, Aloe, and Tramadol    Current Facility-Administered Medications:     sodium chloride flush 0.9 % injection 5-40 mL, 5-40 mL, IntraVENous, 2 times per day, Minh Burger MD    sodium chloride flush 0.9 % injection 5-40 mL, 5-40 mL, IntraVENous, PRN, Minh Burger MD    0.9 % sodium chloride infusion, , IntraVENous, PRN, Minh Burger MD    ondansetron (ZOFRAN-ODT) disintegrating tablet 4 mg, 4 mg, Oral, Q8H PRN **OR** ondansetron (ZOFRAN) injection 4 mg, 4 mg, IntraVENous, Q6H PRN, Minh Burger MD    polyethylene glycol (GLYCOLAX) packet 17 g, 17 g, Oral, Daily PRN, Minh Burger MD    enoxaparin Sodium (LOVENOX) injection 30 mg, 30 mg, SubCUTAneous, BID, iMnh Bugrer MD    rosuvastatin (CRESTOR) tablet 40 mg, 40 mg, Oral, Nightly, Minh Burger MD    aspirin chewable tablet 81 mg, 81 mg, Oral, Daily **OR** aspirin suppository 300 mg, 300 mg, Rectal, Daily, Minh Burger MD    labetalol (NORMODYNE;TRANDATE) injection 10 mg, 10 mg, IntraVENous,

## 2025-02-09 NOTE — PROGRESS NOTES
Pt admitted to 5528 from OSH. A&Ox4. Ambulates independently. Denies dizziness at this time, some blurred VA L eye> R eye. MRI screening completed. Swallow screen completed. 4 eyes completed. Pt oriented to room and call light. Denies pain at this time. WCTM.

## 2025-02-09 NOTE — PLAN OF CARE
Problem: Discharge Planning  Goal: Discharge to home or other facility with appropriate resources  2/9/2025 0558 by Glenna Flores, RN  Outcome: Progressing    Problem: Neurosensory - Adult  Goal: Achieves stable or improved neurological status  Outcome: Progressing

## 2025-02-09 NOTE — DISCHARGE INSTRUCTIONS
If your headaches and dizziness were to persist or worsen follow-up with neurology and neurosurgery    Monitor your blood pressure daily and follow-up with PCP for further management     Also follow-up on the lipid blood work results done here and discuss with PCP on need for lipid lowering medications if needed.

## 2025-02-10 LAB
EST. AVERAGE GLUCOSE BLD GHB EST-MCNC: 105.4 MG/DL
HBA1C MFR BLD: 5.3 %

## 2025-02-10 NOTE — DISCHARGE SUMMARY
V2.0  Discharge Summary    Name:  Aamir Hinds /Age/Sex: 1980 (44 y.o. male)   Admit Date: 2025  Discharge Date: 25    MRN & CSN:  [id number] & [id number] Encounter Date and Time 25 9:07 PM EST    Attending:  No att. providers found Discharging Provider: Sabi Hatfield MD       Hospital Course:     Brief HPI: Aamir Hinds is a 44 y.o. male  with PMHx significant for obesity, hypertension, hyperlipidemia and tobacco use disorder who presented to ED with a complaint of vertigo.  Patient was seen in ED 2 days ago for evaluation of dizzy spell and visual changes.  Patient left AMA.  Patient continued to have lightheadedness and unsteady gait and being off balance which prompted him to come back to the ED for further evaluation.  Patient denies any loss of consciousness or falls.  No nausea vomiting or diarrhea.  Patient denies any focal weakness or numbness.  In ED CT head was obtained and revealed cerebellar tonsils below the foramen magnum which is concerning for type I A Chiari malformation.  Neurosurgery was consulted and the patient is transferred to the [facility] for further neurosurgical evaluation.  Patient currently denies any dizziness or headaches.  He offers no other complaints at this time    Brief Problem Based Course:   Dizziness and vertigo likely 2/2 BPPV  Chiari malformation  MRI brain  mild Chiari I malformation with the cerebellar tonsils extending approximately 6 mm below the basion opisthion  Neurology consulted, low concerns of stroke.  No further inpatient workup warranted.  Follow-up outpatient if symptoms persist or worsens  Neurosurgery consulted, recommend outpatient follow-up    Essential hypertension  Patient not taking any blood pressure medications at home  Counseled on lifestyle modification  Advised to monitor daily blood pressure and follow-up with PCP for further need of initiating antihypertensives.    Hyperlipidemia  Morbid

## 2025-02-17 ENCOUNTER — OFFICE VISIT (OUTPATIENT)
Dept: INTERNAL MEDICINE CLINIC | Age: 45
End: 2025-02-17
Payer: COMMERCIAL

## 2025-02-17 VITALS
SYSTOLIC BLOOD PRESSURE: 108 MMHG | BODY MASS INDEX: 39.93 KG/M2 | HEART RATE: 77 BPM | RESPIRATION RATE: 16 BRPM | WEIGHT: 225.4 LBS | OXYGEN SATURATION: 99 % | TEMPERATURE: 97.7 F | DIASTOLIC BLOOD PRESSURE: 72 MMHG

## 2025-02-17 DIAGNOSIS — G93.5 CHIARI MALFORMATION TYPE I (HCC): Primary | ICD-10-CM

## 2025-02-17 PROCEDURE — 99213 OFFICE O/P EST LOW 20 MIN: CPT

## 2025-02-17 ASSESSMENT — PATIENT HEALTH QUESTIONNAIRE - PHQ9
SUM OF ALL RESPONSES TO PHQ9 QUESTIONS 1 & 2: 0
SUM OF ALL RESPONSES TO PHQ QUESTIONS 1-9: 0
1. LITTLE INTEREST OR PLEASURE IN DOING THINGS: NOT AT ALL
2. FEELING DOWN, DEPRESSED OR HOPELESS: NOT AT ALL

## 2025-02-17 NOTE — PROGRESS NOTES
[facility]    Aamir Hinds is a 44 y.o. male, here for evaluation of the following concerns:    HPI  Patient is a 45 yo male with no significant medical history who came to the clinic after a hospital follow up. Initially presented to [facility] ED on 02/07 with dizziness. After extended duration in ED, decision was made to admit patient but patient was frustrated with wait in ED and left AMA.     2 days later on 02/09, patient came back to the [place] ED with persisting dizziness and vision disturbances. CT head was obtained and showed cerebellar tonsils below foramen magnum concerning for Chiari I malformation. Patient was transferred to [facility] for neurosurgical evaluation. Did not have any other complaints at this time.     NSGY at [facility] did not recommend any further inpatient workup and recommended outpatient follow up with [facility]. However, after trying to make an appointment, patient discovered that his insurance would not cover care provided by [facility]. Thus, patient comes into the clinic requesting a referral to  Neurosurgery.      Review of Systems  Per [facility]    MEDICATIONS:  Prior to Visit Medications    Not on File        Vitals:    02/17/25 1330   BP: 108/72   Site: Right Upper Arm   Position: Sitting   Cuff Size: Medium Adult   Pulse: 77   Resp: 16   Temp: 97.7 °F (36.5 °C)   TempSrc: Temporal   SpO2: 99%   Weight: 102.2 kg (225 lb 6.4 oz)      Estimated body mass index is 39.93 kg/m² as calculated from the following:    Height as of 2/9/25: 1.6 m (5' 3\").    Weight as of this encounter: 102.2 kg (225 lb 6.4 oz).    Physical Exam  Constitutional:       Appearance: Normal appearance.   HENT:      Head: Normocephalic and atraumatic.      Mouth/Throat:      Mouth: Mucous membranes are moist.   Eyes:      Extraocular Movements: Extraocular movements intact.      Pupils: Pupils are equal, round, and reactive to light.

## 2025-02-17 NOTE — PATIENT INSTRUCTIONS
Referral for  Neurosurgery has been made. Please be careful when exerting yourself at work. Don't hesitate to call our clinic with any concerns or questions.

## 2025-03-04 ENCOUNTER — APPOINTMENT (OUTPATIENT)
Dept: CT IMAGING | Age: 45
End: 2025-03-04
Payer: COMMERCIAL

## 2025-03-04 ENCOUNTER — HOSPITAL ENCOUNTER (EMERGENCY)
Age: 45
Discharge: HOME | End: 2025-03-04
Attending: STUDENT IN AN ORGANIZED HEALTH CARE EDUCATION/TRAINING PROGRAM
Payer: COMMERCIAL

## 2025-03-04 VITALS
OXYGEN SATURATION: 97 % | HEIGHT: 63 IN | HEART RATE: 74 BPM | TEMPERATURE: 98.4 F | BODY MASS INDEX: 39.87 KG/M2 | RESPIRATION RATE: 16 BRPM | DIASTOLIC BLOOD PRESSURE: 80 MMHG | SYSTOLIC BLOOD PRESSURE: 161 MMHG | WEIGHT: 225 LBS

## 2025-03-04 DIAGNOSIS — R51.9 NONINTRACTABLE EPISODIC HEADACHE, UNSPECIFIED HEADACHE TYPE: Primary | ICD-10-CM

## 2025-03-04 LAB
FLUAV RNA RESP QL NAA+PROBE: NOT DETECTED
FLUBV RNA RESP QL NAA+PROBE: NOT DETECTED
SARS-COV-2 RNA RESP QL NAA+PROBE: NOT DETECTED

## 2025-03-04 PROCEDURE — 6370000000 HC RX 637 (ALT 250 FOR IP): Performed by: STUDENT IN AN ORGANIZED HEALTH CARE EDUCATION/TRAINING PROGRAM

## 2025-03-04 PROCEDURE — 70450 CT HEAD/BRAIN W/O DYE: CPT

## 2025-03-04 PROCEDURE — 87636 SARSCOV2 & INF A&B AMP PRB: CPT

## 2025-03-04 PROCEDURE — 96375 TX/PRO/DX INJ NEW DRUG ADDON: CPT

## 2025-03-04 PROCEDURE — 96374 THER/PROPH/DIAG INJ IV PUSH: CPT

## 2025-03-04 PROCEDURE — 99284 EMERGENCY DEPT VISIT MOD MDM: CPT

## 2025-03-04 PROCEDURE — 6360000002 HC RX W HCPCS: Performed by: STUDENT IN AN ORGANIZED HEALTH CARE EDUCATION/TRAINING PROGRAM

## 2025-03-04 RX ORDER — ACETAMINOPHEN 500 MG
1000 TABLET ORAL
Status: COMPLETED | OUTPATIENT
Start: 2025-03-04 | End: 2025-03-04

## 2025-03-04 RX ORDER — DIPHENHYDRAMINE HYDROCHLORIDE 50 MG/ML
25 INJECTION INTRAMUSCULAR; INTRAVENOUS ONCE
Status: COMPLETED | OUTPATIENT
Start: 2025-03-04 | End: 2025-03-04

## 2025-03-04 RX ORDER — KETOROLAC TROMETHAMINE 15 MG/ML
15 INJECTION, SOLUTION INTRAMUSCULAR; INTRAVENOUS ONCE
Status: COMPLETED | OUTPATIENT
Start: 2025-03-04 | End: 2025-03-04

## 2025-03-04 RX ORDER — PROCHLORPERAZINE EDISYLATE 5 MG/ML
10 INJECTION INTRAMUSCULAR; INTRAVENOUS ONCE
Status: COMPLETED | OUTPATIENT
Start: 2025-03-04 | End: 2025-03-04

## 2025-03-04 RX ADMIN — ACETAMINOPHEN 1000 MG: 500 TABLET ORAL at 22:01

## 2025-03-04 RX ADMIN — KETOROLAC TROMETHAMINE 15 MG: 15 INJECTION, SOLUTION INTRAMUSCULAR; INTRAVENOUS at 22:02

## 2025-03-04 RX ADMIN — DIPHENHYDRAMINE HYDROCHLORIDE 25 MG: 50 INJECTION INTRAMUSCULAR; INTRAVENOUS at 22:03

## 2025-03-04 RX ADMIN — PROCHLORPERAZINE EDISYLATE 10 MG: 5 INJECTION INTRAMUSCULAR; INTRAVENOUS at 22:04

## 2025-03-04 ASSESSMENT — PAIN - FUNCTIONAL ASSESSMENT: PAIN_FUNCTIONAL_ASSESSMENT: 0-10

## 2025-03-04 ASSESSMENT — PAIN DESCRIPTION - DESCRIPTORS: DESCRIPTORS: DISCOMFORT

## 2025-03-04 ASSESSMENT — PAIN DESCRIPTION - LOCATION
LOCATION: HEAD
LOCATION: HEAD

## 2025-03-04 ASSESSMENT — PAIN SCALES - GENERAL
PAINLEVEL_OUTOF10: 6
PAINLEVEL_OUTOF10: 6

## 2025-03-05 NOTE — ED NOTES
Pt discharged to home. Will follow up as directed. Verbalized understanding of AVS. Pt awake, alert and oriented. Respirations even and unlabored on room air.

## 2025-03-05 NOTE — ED PROVIDER NOTES
[facility] EMERGENCY DEPARTMENT  EMERGENCY DEPARTMENT ENCOUNTER      Pt Name: Aamir Hinds  MRN: [id number]  Birthdate 1980  Date of evaluation: 3/4/2025  Provider: Jami Gates MD    CHIEF COMPLAINT       Chief Complaint   Patient presents with    Headache     Headache since this AM.          HISTORY OF PRESENT ILLNESS   (Location/Symptom, Timing/Onset, Context/Setting, Quality, Duration, Modifying Factors, Severity)  Note limiting factors.   Aamir Hinds is a 44 y.o. male who presents to the emergency department for headache that started this morning when he woke up.  He woke up with a headache.  It gradually got worse throughout the afternoon and has since been actually getting better since then.  There is no associated neck stiffness or vision change or unilateral weakness.  He has a history of migraines, this 1 feels similar.  Involves the anterior forehead bilaterally.  He has not tried any medication for pain.  He has an upcoming appointment with neurology, was here last month and found to have a Chiari malformation.  Denies any dizziness currently.  Is not on any blood thinners.      Chart reviewed: brain MRI from 2/9/25 reviewed: pt had MRI for dizzy spells  1. Mild Chiari I malformation with the cerebellar tonsils extending approximately 6 mm below the basion opisthion line.   2. No acute or significant intracranial abnormality otherwise. Normal MRI of the brain.  He was transferred to [facility], seen by neurosurgery and neurology.  Thought not to be stroke related.  Neurosurgery Cleared him for outpatient follow-up  Nursing Notes were reviewed.    REVIEW OF SYSTEMS    (2-9 systems for level 4, 10 or more for level 5)     Review of Systems    Except as noted above the remainder of the review of systems was reviewed and negative.       PAST MEDICAL HISTORY     Past Medical History:   Diagnosis Date    Chiari I malformation (HCC)     Hypertension     Migraine     Pneumonia     Seizures (HCC)

## 2025-03-05 NOTE — DISCHARGE INSTR - COC
Continuity of Care Form    Patient Name: Aamir Hinds   :  1980  MRN:  [id number]    Admit date:  3/4/2025  Discharge date:  ***    Code Status Order: Prior   Advance Directives:   Advance Care Flowsheet Documentation             Admitting Physician:  No admitting provider for patient encounter.  PCP: No primary care provider on file.    Discharging Nurse: ***  Discharging Hospital Unit/Room#: Triage-A/Tri-A  Discharging Unit Phone Number: ***    Emergency Contact:   Extended Emergency Contact Information  Primary Emergency Contact: Aamir Hinds  Home Phone: [phone]  Mobile Phone: [phone]  Relation: Parent    Past Surgical History:  Past Surgical History:   Procedure Laterality Date    FOOT SURGERY      HAND SURGERY      HERNIA REPAIR      HERNIA REPAIR  2023    KNEE ARTHROSCOPY         Immunization History:   Immunization History   Administered Date(s) Administered    COVID-19, PFIZER GRAY top, DO NOT Dilute, (age 12 y+), IM, 30 mcg/0.3 mL 2022, 2022    TDaP, ADACEL (age 10y-64y), BOOSTRIX (age 10y+), IM, 0.5mL 2015       Active Problems:  Patient Active Problem List   Diagnosis Code    Obesity E66.9    HTN (hypertension) I10    Hyperlipidemia E78.5    Tobacco abuse Z72.0    Chest pain R07.9    BPPV (benign paroxysmal positional vertigo) H81.10    Chiari malformation type I (HCC) G93.5       Isolation/Infection:   Isolation            No Isolation          Patient Infection Status       Infection Onset Added Last Indicated Last Indicated By Review Planned Expiration Resolved Resolved By    MRSA 22 Culture, Wound        Resolved    COVID-19 22 COVID-19, Rapid   02/10/22 Infection                        Nurse Assessment:  Last Vital Signs: BP (!) 161/80   Pulse 74   Temp 98.4 °F (36.9 °C) (Oral)   Resp 16   Ht 1.6 m (5' 3\")   Wt 102.1 kg (225 lb)   SpO2 97%   BMI 39.86 kg/m²     Last documented pain score (0-10

## 2025-03-15 ENCOUNTER — HOSPITAL ENCOUNTER (EMERGENCY)
Age: 45
Discharge: HOME | End: 2025-03-15
Attending: EMERGENCY MEDICINE
Payer: COMMERCIAL

## 2025-03-15 VITALS
DIASTOLIC BLOOD PRESSURE: 91 MMHG | OXYGEN SATURATION: 98 % | SYSTOLIC BLOOD PRESSURE: 145 MMHG | BODY MASS INDEX: 39.87 KG/M2 | RESPIRATION RATE: 20 BRPM | WEIGHT: 225 LBS | HEIGHT: 63 IN | TEMPERATURE: 98.3 F | HEART RATE: 82 BPM

## 2025-03-15 DIAGNOSIS — Z00.00 EVALUATION BY MEDICAL SERVICE REQUIRED: Primary | ICD-10-CM

## 2025-03-15 DIAGNOSIS — H53.9 VISION CHANGES: ICD-10-CM

## 2025-03-15 PROCEDURE — 99282 EMERGENCY DEPT VISIT SF MDM: CPT

## 2025-03-16 NOTE — DISCHARGE INSTRUCTIONS
You were here requesting a work note.  We have provided this.  I have attached our neurosurgery department's contact information.  You should come back if you have any acute concerns.

## 2025-03-16 NOTE — ED NOTES
Patient prepared for and ready to be discharged. Dressed in clothes and given belongings.  Patient discharged at this time in no acute distress after pt verbalized understanding of discharge instructions.   Reviewed medications, and when to return to the ED with patient. Encouraged follow up with Neuro  Patient walked to lobby, Family to take patient home.      Escuadra, Marylee, RN  03/15/25 1797

## 2025-03-16 NOTE — ED PROVIDER NOTES
ED Attending Attestation Note     Date of evaluation: 3/15/2025    This patient was seen by the resident.  I have seen and examined the patient, agree with the workup, evaluation, management and diagnosis. The care plan has been discussed.  My assessment reveals well-appearing male in no acute distress.  He has a history of Chiari I malformation and has had issues with vision well-documented over the past couple months.  He notes he had an episode today where he had blurry peripheral vision.  He states that this went away but he had to miss work so he is coming in requesting a work note for today.  He just had a head CT done about 10 days ago that was unremarkable.  He states he does not have a headache and its actually he one of the few days that he does not have a headache as he feels very well now.  He denies any sick symptoms.  Extraoculars are full and intact gross vision is intact as well.  No neurologic deficit appreciated.  At this point in time we discussed the repeat head CT or lab workup but he is not concerned about this and states has had it before and has been told to expect this.  We will give him a work note at this point in time given his unremarkable physical exam as well as prior workup and the fact that he does have close outpatient follow-up     Avila Collado MD  03/15/25 1758    
medications:  No orders of the defined types were placed in this encounter.      CONSULTS:  None    Review of Systems     Review of systems negative except for as noted in the HPI.    Past Medical, Surgical, Family, and Social History     Relevant Hx:     He has a past medical history of Chiari I malformation (HCC), Hypertension, Migraine, Pneumonia, and Seizures (HCC).  He has a past surgical history that includes Knee arthroscopy; Hand surgery; Foot surgery; hernia repair; and hernia repair (03/2023).  His family history is not on file.  He reports that he has been smoking pipe and cigars. He has never used smokeless tobacco. He reports current alcohol use. He reports that he does not use drugs.    Medications     There are no discharge medications for this patient.      Allergies     He is allergic to azithromycin, codeine, lidocaine hcl, procaine hcl, zolpidem tartrate, zolpidem tartrate, aloe, and tramadol.    Physical Exam     INITIAL VITALS: BP: (!) 145/91, Temp: 98.3 °F (36.8 °C), Pulse: 82, Respirations: 20, SpO2: 98 %     General: Well appearing. Found sitting up in bed, resting comfortably, answering questions appropriately and in complete sentences.   Eyes: Pupils 5mm equal round and reactive. EOMI.   ENT: Tolerating oral secretions. Moist mucous membranes.  Pulmonary: Breath sounds clear and equal bilaterally. No tachypnea or accessory muscle use.  Cardiac: Regular rate and rhythm. Strong radial pulses.   Extremities: No edema in BLE, normal muscle bulk and tone.   Skin: Warm, dry, no rash on exposed areas.  Neuro: Alert and oriented x4. Speech clear and fluent. Moving all four extremities equally. Normal gait        Yusra Morgan MD  Resident  03/16/25 0151

## 2025-03-18 ENCOUNTER — HOSPITAL ENCOUNTER (EMERGENCY)
Age: 45
Discharge: HOME | End: 2025-03-18
Payer: COMMERCIAL

## 2025-03-18 VITALS
HEIGHT: 63 IN | DIASTOLIC BLOOD PRESSURE: 96 MMHG | RESPIRATION RATE: 20 BRPM | TEMPERATURE: 98.1 F | SYSTOLIC BLOOD PRESSURE: 167 MMHG | BODY MASS INDEX: 38.98 KG/M2 | OXYGEN SATURATION: 93 % | WEIGHT: 220 LBS | HEART RATE: 83 BPM

## 2025-03-18 DIAGNOSIS — M54.2 NECK PAIN: ICD-10-CM

## 2025-03-18 DIAGNOSIS — M62.838 TRAPEZIUS MUSCLE SPASM: Primary | ICD-10-CM

## 2025-03-18 PROCEDURE — 99284 EMERGENCY DEPT VISIT MOD MDM: CPT

## 2025-03-18 PROCEDURE — 6370000000 HC RX 637 (ALT 250 FOR IP): Performed by: PHYSICIAN ASSISTANT

## 2025-03-18 PROCEDURE — 96372 THER/PROPH/DIAG INJ SC/IM: CPT

## 2025-03-18 PROCEDURE — 6360000002 HC RX W HCPCS: Performed by: PHYSICIAN ASSISTANT

## 2025-03-18 RX ORDER — HYDROCODONE BITARTRATE AND ACETAMINOPHEN 5; 325 MG/1; MG/1
1 TABLET ORAL ONCE
Status: COMPLETED | OUTPATIENT
Start: 2025-03-18 | End: 2025-03-18

## 2025-03-18 RX ORDER — METHOCARBAMOL 500 MG/1
500-1000 TABLET, FILM COATED ORAL 4 TIMES DAILY
Qty: 40 TABLET | Refills: 0 | Status: SHIPPED | OUTPATIENT
Start: 2025-03-18 | End: 2025-03-23

## 2025-03-18 RX ORDER — DICLOFENAC SODIUM 75 MG/1
75 TABLET, DELAYED RELEASE ORAL 2 TIMES DAILY
Qty: 30 TABLET | Refills: 0 | Status: SHIPPED | OUTPATIENT
Start: 2025-03-18

## 2025-03-18 RX ORDER — KETOROLAC TROMETHAMINE 30 MG/ML
30 INJECTION, SOLUTION INTRAMUSCULAR; INTRAVENOUS ONCE
Status: COMPLETED | OUTPATIENT
Start: 2025-03-18 | End: 2025-03-18

## 2025-03-18 RX ORDER — ORPHENADRINE CITRATE 30 MG/ML
60 INJECTION INTRAMUSCULAR; INTRAVENOUS ONCE
Status: COMPLETED | OUTPATIENT
Start: 2025-03-18 | End: 2025-03-18

## 2025-03-18 RX ADMIN — HYDROCODONE BITARTRATE AND ACETAMINOPHEN 1 TABLET: 5; 325 TABLET ORAL at 21:38

## 2025-03-18 RX ADMIN — KETOROLAC TROMETHAMINE 30 MG: 30 INJECTION, SOLUTION INTRAMUSCULAR at 21:38

## 2025-03-18 RX ADMIN — ORPHENADRINE CITRATE 60 MG: 60 INJECTION INTRAMUSCULAR; INTRAVENOUS at 21:38

## 2025-03-18 ASSESSMENT — PAIN DESCRIPTION - LOCATION: LOCATION: NECK

## 2025-03-18 ASSESSMENT — ENCOUNTER SYMPTOMS
SHORTNESS OF BREATH: 0
COUGH: 0
RHINORRHEA: 0
SORE THROAT: 0
DIARRHEA: 0
VOMITING: 0
BACK PAIN: 1
EYE REDNESS: 0
COLOR CHANGE: 0
ABDOMINAL PAIN: 0
NAUSEA: 0

## 2025-03-18 ASSESSMENT — PAIN DESCRIPTION - ORIENTATION: ORIENTATION: RIGHT

## 2025-03-18 ASSESSMENT — PAIN SCALES - GENERAL: PAINLEVEL_OUTOF10: 8

## 2025-03-18 ASSESSMENT — PAIN - FUNCTIONAL ASSESSMENT: PAIN_FUNCTIONAL_ASSESSMENT: 0-10

## 2025-03-19 NOTE — ED PROVIDER NOTES
[facility] EMERGENCY DEPARTMENT  EMERGENCY DEPARTMENT ENCOUNTER        Pt Name: Aamir Hinds  MRN: [id number]  Birthdate 1980  Date of evaluation: 3/18/2025  Provider: JOSE Abbott  PCP: No primary care provider on file.  Note Started: 10:13 PM EDT 3/18/25      HENRY. I have evaluated this patient.        CHIEF COMPLAINT       Chief Complaint   Patient presents with    Neck Pain     Pt arrived to ED from home states he started having neck pain this morning that is now going down his back. Denies any known injury.        HISTORY OF PRESENT ILLNESS: 1 or more Elements     History From: Patient  Limitations to history : None    Aamir Hinds is a 44 y.o. male who presents to the emergency department for evaluation of neck/back pain.  Patient believes he may have slept wrong last night.  He woke up with the symptoms.  He has pain to the right side of his neck and along his right back.  He denies any midline pain.  He denies any injury or trauma.  He denies numbness/tingling or focal weakness.  He maintains full range of motion of the neck, and denies fever or recent illness.  Patient is requesting a work note.  He has no other acute complaints.    Nursing Notes were all reviewed and agreed with or any disagreements were addressed in the HPI.    REVIEW OF SYSTEMS :      Review of Systems   Constitutional:  Negative for chills, fatigue and fever.   HENT:  Negative for congestion, rhinorrhea and sore throat.    Eyes:  Negative for redness.   Respiratory:  Negative for cough and shortness of breath.    Cardiovascular:  Negative for chest pain and palpitations.   Gastrointestinal:  Negative for abdominal pain, diarrhea, nausea and vomiting.   Genitourinary:  Negative for dysuria.   Musculoskeletal:  Positive for back pain and neck pain. Negative for arthralgias and joint swelling.   Skin:  Negative for color change.   Neurological:  Negative for seizures, syncope, numbness and headaches.

## 2025-05-12 ENCOUNTER — OFFICE VISIT (OUTPATIENT)
Dept: INTERNAL MEDICINE CLINIC | Age: 45
End: 2025-05-12
Payer: COMMERCIAL

## 2025-05-12 VITALS
HEART RATE: 83 BPM | BODY MASS INDEX: 41.6 KG/M2 | OXYGEN SATURATION: 94 % | RESPIRATION RATE: 18 BRPM | DIASTOLIC BLOOD PRESSURE: 87 MMHG | SYSTOLIC BLOOD PRESSURE: 128 MMHG | TEMPERATURE: 97.9 F | HEIGHT: 63 IN | WEIGHT: 234.8 LBS

## 2025-05-12 DIAGNOSIS — I10 PRIMARY HYPERTENSION: Primary | ICD-10-CM

## 2025-05-12 DIAGNOSIS — Z72.0 TOBACCO ABUSE: ICD-10-CM

## 2025-05-12 DIAGNOSIS — G93.5 CHIARI MALFORMATION TYPE I (HCC): ICD-10-CM

## 2025-05-12 DIAGNOSIS — E78.49 OTHER HYPERLIPIDEMIA: ICD-10-CM

## 2025-05-12 PROCEDURE — 99213 OFFICE O/P EST LOW 20 MIN: CPT

## 2025-05-12 RX ORDER — ATORVASTATIN CALCIUM 20 MG/1
20 TABLET, FILM COATED ORAL DAILY
Qty: 90 TABLET | Refills: 0 | Status: SHIPPED | OUTPATIENT
Start: 2025-05-12

## 2025-05-12 NOTE — PROGRESS NOTES
[facility]    Aamir Hinds is a 44 y.o. male with PMH of Chiari I malformation (w/ possible IIH), obesity, HTN, HLD here for evaluation of the following concerns:    Chiari I malformation    Patient was recently diagnosed with a Chiari I malformation upon having a severe headache, presented to [facility] 02/08/2025 with dizziness and vision problems-head CT demonstrated cerebellar tonsils below the foramen magnum.  Since then he has been evaluated by neurosurgery at [facility] and [facility], currently being followed by  neurosurgery who recommended that patient have a neuro ophthalmology evaluation (after recent CSF study) before any plan for intervention.  At the current time his symptoms are stable, and he describes a generalized 10 out of 10 headache occurring approximately 1-2 times per month without clear precedent that is borderline debilitating.  The headache is generally focused in the back of his head and radiates to a generalized quality, it is of a dull nature and he describes the relieving factors of lying down or mild relief with Tylenol.  He does not report any recent visual changes, syncope, tinnitus, vertigo.     Left ear discomfort    Patient describes having recent nasal congestion due to viral syndrome last Thursday resulting in left ear fullness shortly afterwards.  He describes that the discomfort is mild to moderate, and had resulting itching for which he used Q-tips.  In the 3 days preceding clinic visit he had interval improvement in the discomfort.    Review of Systems   HENT:  Positive for ear pain.    Neurological:  Positive for dizziness and headaches.   All other systems reviewed and are negative.      MEDICATIONS:  Prior to Visit Medications    Medication Sig Taking? Authorizing Provider   atorvastatin (LIPITOR) 20 MG tablet Take 1 tablet by mouth daily Yes Aguila Andre MD   diclofenac (VOLTAREN) 75 MG EC tablet

## 2025-07-08 ENCOUNTER — OFFICE VISIT (OUTPATIENT)
Dept: INTERNAL MEDICINE CLINIC | Age: 45
End: 2025-07-08
Payer: COMMERCIAL

## 2025-07-08 VITALS
TEMPERATURE: 98.1 F | SYSTOLIC BLOOD PRESSURE: 156 MMHG | HEART RATE: 84 BPM | DIASTOLIC BLOOD PRESSURE: 94 MMHG | RESPIRATION RATE: 20 BRPM | OXYGEN SATURATION: 100 % | BODY MASS INDEX: 40.56 KG/M2 | WEIGHT: 237.6 LBS | HEIGHT: 64 IN

## 2025-07-08 DIAGNOSIS — G93.5 CHIARI MALFORMATION TYPE I (HCC): ICD-10-CM

## 2025-07-08 DIAGNOSIS — I10 PRIMARY HYPERTENSION: Primary | ICD-10-CM

## 2025-07-08 DIAGNOSIS — H61.22 IMPACTED CERUMEN OF LEFT EAR: ICD-10-CM

## 2025-07-08 PROCEDURE — 99213 OFFICE O/P EST LOW 20 MIN: CPT

## 2025-07-08 RX ORDER — AMLODIPINE BESYLATE 2.5 MG/1
2.5 TABLET ORAL DAILY
Qty: 90 TABLET | Refills: 0 | Status: SHIPPED | OUTPATIENT
Start: 2025-07-08

## 2025-07-08 ASSESSMENT — ENCOUNTER SYMPTOMS
RESPIRATORY NEGATIVE: 1
GASTROINTESTINAL NEGATIVE: 1

## 2025-07-08 NOTE — PATIENT INSTRUCTIONS
Please be sure to  your medication for blood pressure at your pharmacy. Please start taking Amlodipine 2.5mg once daily and keep a log of your blood pressures at home around the same time each day. Please bring that log for your next clinic visit so we may potentially adjust the dose of your medication. Don't hesitate to call our clinic with any questions or concerns. We will see you in about 6 weeks time.

## 2025-07-08 NOTE — PROGRESS NOTES
[facility]    Aamir Hinds is a 44 y.o. male, here for evaluation of the following concerns: Routine follow up    HPI:  Patient is a 44-year-old male with a past medical history of Chiari I malformation who comes in for routine follow-up.    Regarding his Chiari I malformation, he was diagnosed back in 02/2025 after presenting with headache and vision disturbances.  Has since been evaluated by neurosurgery at  who recommended the patient have a neuro-ophthalmology evaluation before any consideration for intervention.  Patient states his next appointment with  neurosurgery is in January 2026.  At this time, patient endorses no new symptoms.  States he has chronic generalized headache about once a month but denies any vision disturbances or lightheadedness.    Today, patient complains of left ear discomfort and fullness.  States he has had his ears cleaned and irrigated before due to excess buildup of earwax.  Does not endorse any ear pain, fevers, chills but does state his hearing feels impaired, stating that it feels like he is \"hearing through a tunnel\".      Review of Systems   Constitutional:  Negative for chills and fever.   HENT:          Ear fullness   Respiratory: Negative.     Cardiovascular: Negative.    Gastrointestinal: Negative.    Genitourinary: Negative.    Musculoskeletal: Negative.    Neurological:  Negative for dizziness, syncope and light-headedness.       MEDICATIONS:  Prior to Visit Medications    Medication Sig Taking? Authorizing Provider   atorvastatin (LIPITOR) 20 MG tablet Take 1 tablet by mouth daily Yes Aguila Andre MD   diclofenac (VOLTAREN) 75 MG EC tablet Take 1 tablet by mouth 2 times daily  Patient not taking: Reported on 7/8/2025  Cee Echevarria PA        Vitals:    07/08/25 1457 07/08/25 1503   BP: (!) 147/91 (!) 156/94   BP Site: Left Upper Arm Right Upper Arm   Patient Position: Sitting Sitting   BP Cuff Size: Large Adult Large

## 2025-07-23 PROBLEM — E66.01 CLASS 2 SEVERE OBESITY DUE TO EXCESS CALORIES WITH SERIOUS COMORBIDITY AND BODY MASS INDEX (BMI) OF 39.0 TO 39.9 IN ADULT (HCC): Chronic | Status: ACTIVE | Noted: 2022-05-02

## 2025-07-23 PROBLEM — G93.5 CHIARI MALFORMATION TYPE I (HCC): Chronic | Status: ACTIVE | Noted: 2025-02-09

## 2025-07-23 PROBLEM — I10 HTN (HYPERTENSION): Chronic | Status: ACTIVE | Noted: 2022-05-02

## 2025-07-23 PROBLEM — E66.812 CLASS 2 SEVERE OBESITY DUE TO EXCESS CALORIES WITH SERIOUS COMORBIDITY AND BODY MASS INDEX (BMI) OF 39.0 TO 39.9 IN ADULT (HCC): Chronic | Status: ACTIVE | Noted: 2022-05-02

## 2025-07-24 ENCOUNTER — TELEPHONE (OUTPATIENT)
Dept: SLEEP CENTER | Age: 45
End: 2025-07-24

## 2025-07-24 NOTE — TELEPHONE ENCOUNTER
Called to schedule a PSG per Raoul farrar for the pt to return my call     [facility] insurance